# Patient Record
Sex: FEMALE | Race: BLACK OR AFRICAN AMERICAN | NOT HISPANIC OR LATINO
[De-identification: names, ages, dates, MRNs, and addresses within clinical notes are randomized per-mention and may not be internally consistent; named-entity substitution may affect disease eponyms.]

---

## 2017-03-01 ENCOUNTER — APPOINTMENT (OUTPATIENT)
Dept: CV DIAGNOSITCS | Facility: HOSPITAL | Age: 82
End: 2017-03-01

## 2017-03-01 ENCOUNTER — OUTPATIENT (OUTPATIENT)
Dept: OUTPATIENT SERVICES | Facility: HOSPITAL | Age: 82
LOS: 1 days | End: 2017-03-01
Payer: COMMERCIAL

## 2017-03-01 DIAGNOSIS — I05.9 RHEUMATIC MITRAL VALVE DISEASE, UNSPECIFIED: ICD-10-CM

## 2017-03-01 DIAGNOSIS — Z98.49 CATARACT EXTRACTION STATUS, UNSPECIFIED EYE: Chronic | ICD-10-CM

## 2017-03-01 DIAGNOSIS — Z98.89 OTHER SPECIFIED POSTPROCEDURAL STATES: Chronic | ICD-10-CM

## 2017-03-01 PROCEDURE — 93306 TTE W/DOPPLER COMPLETE: CPT | Mod: 26

## 2017-03-01 PROCEDURE — 93312 ECHO TRANSESOPHAGEAL: CPT | Mod: 26

## 2017-03-15 ENCOUNTER — APPOINTMENT (OUTPATIENT)
Dept: CARDIOTHORACIC SURGERY | Facility: CLINIC | Age: 82
End: 2017-03-15

## 2017-03-15 VITALS
SYSTOLIC BLOOD PRESSURE: 180 MMHG | TEMPERATURE: 98.3 F | OXYGEN SATURATION: 97 % | DIASTOLIC BLOOD PRESSURE: 84 MMHG | BODY MASS INDEX: 24.75 KG/M2 | RESPIRATION RATE: 18 BRPM | HEART RATE: 108 BPM | WEIGHT: 145 LBS | HEIGHT: 64 IN

## 2017-03-15 VITALS — DIASTOLIC BLOOD PRESSURE: 107 MMHG | SYSTOLIC BLOOD PRESSURE: 174 MMHG

## 2017-03-15 RX ORDER — EZETIMIBE 10 MG/1
10 TABLET ORAL
Refills: 0 | Status: ACTIVE | COMMUNITY

## 2017-03-15 RX ORDER — ASPIRIN ENTERIC COATED TABLETS 81 MG 81 MG/1
81 TABLET, DELAYED RELEASE ORAL DAILY
Refills: 0 | Status: ACTIVE | COMMUNITY

## 2017-03-17 ENCOUNTER — OUTPATIENT (OUTPATIENT)
Dept: OUTPATIENT SERVICES | Facility: HOSPITAL | Age: 82
LOS: 1 days | End: 2017-03-17
Payer: COMMERCIAL

## 2017-03-17 ENCOUNTER — APPOINTMENT (OUTPATIENT)
Dept: CT IMAGING | Facility: IMAGING CENTER | Age: 82
End: 2017-03-17

## 2017-03-17 DIAGNOSIS — Z98.89 OTHER SPECIFIED POSTPROCEDURAL STATES: Chronic | ICD-10-CM

## 2017-03-17 DIAGNOSIS — Z00.8 ENCOUNTER FOR OTHER GENERAL EXAMINATION: ICD-10-CM

## 2017-03-17 DIAGNOSIS — Z98.49 CATARACT EXTRACTION STATUS, UNSPECIFIED EYE: Chronic | ICD-10-CM

## 2017-03-17 PROCEDURE — 82565 ASSAY OF CREATININE: CPT

## 2017-03-17 PROCEDURE — 71260 CT THORAX DX C+: CPT

## 2017-03-17 PROCEDURE — 74177 CT ABD & PELVIS W/CONTRAST: CPT

## 2017-03-24 ENCOUNTER — OUTPATIENT (OUTPATIENT)
Dept: OUTPATIENT SERVICES | Facility: HOSPITAL | Age: 82
LOS: 1 days | End: 2017-03-24
Payer: COMMERCIAL

## 2017-03-24 VITALS
SYSTOLIC BLOOD PRESSURE: 173 MMHG | DIASTOLIC BLOOD PRESSURE: 103 MMHG | HEIGHT: 65 IN | HEART RATE: 86 BPM | TEMPERATURE: 98 F | WEIGHT: 149.91 LBS | RESPIRATION RATE: 16 BRPM | OXYGEN SATURATION: 98 %

## 2017-03-24 DIAGNOSIS — Z98.49 CATARACT EXTRACTION STATUS, UNSPECIFIED EYE: Chronic | ICD-10-CM

## 2017-03-24 DIAGNOSIS — I25.10 ATHEROSCLEROTIC HEART DISEASE OF NATIVE CORONARY ARTERY WITHOUT ANGINA PECTORIS: ICD-10-CM

## 2017-03-24 DIAGNOSIS — Z98.89 OTHER SPECIFIED POSTPROCEDURAL STATES: Chronic | ICD-10-CM

## 2017-03-24 LAB
ALBUMIN SERPL ELPH-MCNC: 3.8 G/DL — SIGNIFICANT CHANGE UP (ref 3.3–5)
ALP SERPL-CCNC: 75 U/L — SIGNIFICANT CHANGE UP (ref 40–120)
ALT FLD-CCNC: 25 U/L RC — SIGNIFICANT CHANGE UP (ref 10–45)
ANION GAP SERPL CALC-SCNC: 17 MMOL/L — SIGNIFICANT CHANGE UP (ref 5–17)
APTT BLD: 32.4 SEC — SIGNIFICANT CHANGE UP (ref 27.5–37.4)
AST SERPL-CCNC: 18 U/L — SIGNIFICANT CHANGE UP (ref 10–40)
BILIRUB SERPL-MCNC: 0.9 MG/DL — SIGNIFICANT CHANGE UP (ref 0.2–1.2)
BUN SERPL-MCNC: 21 MG/DL — SIGNIFICANT CHANGE UP (ref 7–23)
CALCIUM SERPL-MCNC: 9.1 MG/DL — SIGNIFICANT CHANGE UP (ref 8.4–10.5)
CHLORIDE SERPL-SCNC: 103 MMOL/L — SIGNIFICANT CHANGE UP (ref 96–108)
CO2 SERPL-SCNC: 24 MMOL/L — SIGNIFICANT CHANGE UP (ref 22–31)
CREAT SERPL-MCNC: 1.19 MG/DL — SIGNIFICANT CHANGE UP (ref 0.5–1.3)
GLUCOSE SERPL-MCNC: 124 MG/DL — HIGH (ref 70–99)
HCT VFR BLD CALC: 38 % — SIGNIFICANT CHANGE UP (ref 34.5–45)
HGB BLD-MCNC: 12.5 G/DL — SIGNIFICANT CHANGE UP (ref 11.5–15.5)
INR BLD: 1.19 RATIO — HIGH (ref 0.88–1.16)
MCHC RBC-ENTMCNC: 28.5 PG — SIGNIFICANT CHANGE UP (ref 27–34)
MCHC RBC-ENTMCNC: 33 GM/DL — SIGNIFICANT CHANGE UP (ref 32–36)
MCV RBC AUTO: 86.4 FL — SIGNIFICANT CHANGE UP (ref 80–100)
PLATELET # BLD AUTO: 143 K/UL — LOW (ref 150–400)
POTASSIUM SERPL-MCNC: 3.4 MMOL/L — LOW (ref 3.5–5.3)
POTASSIUM SERPL-SCNC: 3.4 MMOL/L — LOW (ref 3.5–5.3)
PROT SERPL-MCNC: 6.7 G/DL — SIGNIFICANT CHANGE UP (ref 6–8.3)
PROTHROM AB SERPL-ACNC: 13 SEC — HIGH (ref 9.8–12.7)
RBC # BLD: 4.4 M/UL — SIGNIFICANT CHANGE UP (ref 3.8–5.2)
RBC # FLD: 15.7 % — HIGH (ref 10.3–14.5)
SODIUM SERPL-SCNC: 144 MMOL/L — SIGNIFICANT CHANGE UP (ref 135–145)
WBC # BLD: 5.3 K/UL — SIGNIFICANT CHANGE UP (ref 3.8–10.5)
WBC # FLD AUTO: 5.3 K/UL — SIGNIFICANT CHANGE UP (ref 3.8–10.5)

## 2017-03-24 PROCEDURE — C1887: CPT

## 2017-03-24 PROCEDURE — 93461 R&L HRT ART/VENTRICLE ANGIO: CPT | Mod: 26,GC,59

## 2017-03-24 PROCEDURE — 85610 PROTHROMBIN TIME: CPT

## 2017-03-24 PROCEDURE — 85730 THROMBOPLASTIN TIME PARTIAL: CPT

## 2017-03-24 PROCEDURE — 85027 COMPLETE CBC AUTOMATED: CPT

## 2017-03-24 PROCEDURE — 80053 COMPREHEN METABOLIC PANEL: CPT

## 2017-03-24 PROCEDURE — C1769: CPT

## 2017-03-24 PROCEDURE — 93010 ELECTROCARDIOGRAM REPORT: CPT

## 2017-03-24 PROCEDURE — C1894: CPT

## 2017-03-24 PROCEDURE — 93005 ELECTROCARDIOGRAM TRACING: CPT

## 2017-03-24 PROCEDURE — 93461 R&L HRT ART/VENTRICLE ANGIO: CPT

## 2017-03-24 NOTE — H&P CARDIOLOGY - PMH
Breast cancer  s/p resection, chemo  CAD (coronary artery disease)    Cancer    Cardiac arrhythmia    Cataract  Left  GERD (gastroesophageal reflux disease)    Gout    HLD (hyperlipidemia)    HTN    Hypertension    PAF (paroxysmal atrial fibrillation)  7/13 prior to CABG

## 2017-03-24 NOTE — H&P CARDIOLOGY - PSH
S/P CABG  x3 on 7/1/13  S/P cataract extraction  bilaterally-2014 withy artificial lenses  S/P hysterectomy    Status post left breast lumpectomy

## 2017-03-24 NOTE — H&P CARDIOLOGY - HISTORY OF PRESENT ILLNESS
This is a 82 y/o F with PMx of breast ca (dx'ed in July, s/p resection, was on chemo, now in remission as per patient), HTN, HLD, PAF(on Eliquis last dose 2-3 weeks ago, it was stopped r/t blood in urine- no further bleeding noted since Eliquis was stopped), s/p fallx1 ( x1 month ago, she states she was on the floor for 7 hours before her neighbor found her and helped her ).  Presents to Md Cowan with c/o of  SOB x1 month; patient states SOB started right after her fall, denies any injury from fall.  She states lying down makes SOB worse and not associated with exertion, denies cough. She states she become weaker with the chemo ( reason why she believes she fell). Referred here for cardiac cath pre TAVR. Currently, denies any: fever, chills, head trauma, syncope, LOC, chest pain, LE edema, abdominal pain, melena, hematochezia, diarrhea, constipation, or ARMSTRONG. This is a 82 y/o F with PMx of breast ca (dx'ed in July, s/p resection, was on chemo, now in remission as per patient), HTN, HLD, PAF(on Eliquis last dose 2-3 weeks ago, it was stopped r/t blood in urine- no further bleeding noted since Eliquis was stopped), s/p fallx1 ( x1 month ago, she states she was on the floor for 7 hours before her neighbor found her and helped her ).  Presents to Md Cowan with c/o of  SOB x1 month; patient states SOB started right after her fall, denies any injury from fall.  She states lying down makes SOB worse and not associated with exertion, denies cough. She states she become weaker with the chemo ( reason why she believes she fell). Abnormal ECHO ( see report below). Referred here for cardiac cath for valves evaluation. Currently, denies any: fever, chills, head trauma, syncope, LOC, chest pain, LE edema, abdominal pain, melena, hematochezia, diarrhea, constipation, or ARMSTRONG.     ECHO 03/01/2017  Conclusions:  1. Mitral annular calcification and calcified mitral  leaflets with normal diastolic opening.  Central  malcoaptation. Severe central mitral regurgitation (2  jets).  Pulmonary vein systolic flow reversal.  2. Calcified trileaflet aortic valve with decreased  opening. Peak transaortic valve gradient equals 7 mm Hg,  mean transaortic valve gradient equals 4 mm Hg, estimated  aortic valve area equals 1.8 sqcm (by planimetry), aortic  valve velocity time integral equals 27 cm, consistent with  mild aortic stenosis.  3. Severely dilated left atrium.  LA volume index = 109  cc/m2.   No left atrial or left atrial appendage thrombus.   Decreased left atrial appendage velocities noted.  4. Eccentric left ventricular hypertrophy (dilated left  ventricle with normal relative wall thickness).  5. Moderate segmental left ventricular systolic  dysfunction.  Akinesis of the mid to apical septum, apex,  distal inferior wall.  6. Moderate right atrial enlargement.  7. Estimated pulmonary artery systolic pressure equals 52  mm Hg, assuming right atrial pressure equals 8 mm Hg,  consistent with moderate pulmonary pressures.  8. Agitated saline injection and color flow Doppler  demonstrate no evidence of a patent foramen ovale.    03/17/2017  CT IMPRESSION:     CHEST:  1. Relationship of mediastinal structures to sternum as described above.   2. Cardiomegaly and left ventricular apical aneurysm.   3. Small bilateral pleural effusions.     ABDOMEN AND PELVIS:   1. 2 small suspicious right renal lesions. Is recommended to further   characterize. This is a 82 y/o F with PMx of breast ca (dx'ed in July, s/p resection, was on chemo, now in remission as per patient), HTN, HLD, PAF(on Eliquis last dose 2-3 weeks ago, it was stopped r/t blood in urine- no further bleeding noted since Eliquis was stopped), s/p fallx1 ( x1 month ago, she states she was on the floor for 7 hours before her neighbor found her and helped her ).  Presents to Md Cowan with c/o of  SOB x1 month; patient states SOB started right after her fall, denies any injury from fall.  She states lying down makes SOB worse and not associated with exertion, denies cough. She states she become weaker with the chemo ( reason why she believes she fell). Abnormal ECHO ( see report below). Referred here for cardiac cath for valves evaluation, pre-TAVR work up. Currently, denies any: fever, chills, head trauma, syncope, LOC, chest pain, LE edema, abdominal pain, melena, hematochezia, diarrhea, constipation, or ARMSTRONG.     ECHO 03/01/2017  Conclusions:  1. Mitral annular calcification and calcified mitral  leaflets with normal diastolic opening.  Central  malcoaptation. Severe central mitral regurgitation (2  jets).  Pulmonary vein systolic flow reversal.  2. Calcified trileaflet aortic valve with decreased  opening. Peak transaortic valve gradient equals 7 mm Hg,  mean transaortic valve gradient equals 4 mm Hg, estimated  aortic valve area equals 1.8 sqcm (by planimetry), aortic  valve velocity time integral equals 27 cm, consistent with  mild aortic stenosis.  3. Severely dilated left atrium.  LA volume index = 109  cc/m2.   No left atrial or left atrial appendage thrombus.   Decreased left atrial appendage velocities noted.  4. Eccentric left ventricular hypertrophy (dilated left  ventricle with normal relative wall thickness).  5. Moderate segmental left ventricular systolic  dysfunction.  Akinesis of the mid to apical septum, apex,  distal inferior wall.  6. Moderate right atrial enlargement.  7. Estimated pulmonary artery systolic pressure equals 52  mm Hg, assuming right atrial pressure equals 8 mm Hg,  consistent with moderate pulmonary pressures.  8. Agitated saline injection and color flow Doppler  demonstrate no evidence of a patent foramen ovale.    03/17/2017  CT IMPRESSION:     CHEST:  1. Relationship of mediastinal structures to sternum as described above.   2. Cardiomegaly and left ventricular apical aneurysm.   3. Small bilateral pleural effusions.     ABDOMEN AND PELVIS:   1. 2 small suspicious right renal lesions. Is recommended to further   characterize. This is a 82 y/o F with PMx of breast ca (dx'ed in July, s/p resection, was on chemo, now in remission as per patient), HTN, HLD, PAF(on Eliquis last dose 2-3 weeks ago, it was stopped r/t blood in urine- no further bleeding noted since Eliquis was stopped), s/p fallx1 ( x1 month ago, she states she was on the floor for 7 hours before her neighbor found her and helped her ), lower extremities edema.  Presents to Md Cowan with c/o of  SOB x1 month; patient states SOB started right after her fall, denies any injury from fall.  She states lying down makes SOB worse and not associated with exertion, denies cough. She states she become weaker with the chemo ( reason why she believes she fell). Abnormal ECHO ( see report below). Referred here for cardiac cath for valves evaluation, pre-TAVR work up. Currently, denies any: fever, chills, head trauma, syncope, LOC, chest pain, abdominal pain, melena, hematochezia, diarrhea, constipation, or ARMSTRONG.     ECHO 03/01/2017  EF 40%  Conclusions:  1. Mitral annular calcification and calcified mitral  leaflets with normal diastolic opening.  Central  malcoaptation. Severe central mitral regurgitation (2  jets).  Pulmonary vein systolic flow reversal.  2. Calcified trileaflet aortic valve with decreased  opening. Peak transaortic valve gradient equals 7 mm Hg,  mean transaortic valve gradient equals 4 mm Hg, estimated  aortic valve area equals 1.8 sqcm (by planimetry), aortic  valve velocity time integral equals 27 cm, consistent with  mild aortic stenosis.  3. Severely dilated left atrium.  LA volume index = 109  cc/m2.   No left atrial or left atrial appendage thrombus.   Decreased left atrial appendage velocities noted.  4. Eccentric left ventricular hypertrophy (dilated left  ventricle with normal relative wall thickness).  5. Moderate segmental left ventricular systolic  dysfunction.  Akinesis of the mid to apical septum, apex,  distal inferior wall.  6. Moderate right atrial enlargement.  7. Estimated pulmonary artery systolic pressure equals 52  mm Hg, assuming right atrial pressure equals 8 mm Hg,  consistent with moderate pulmonary pressures.  8. Agitated saline injection and color flow Doppler  demonstrate no evidence of a patent foramen ovale.    03/17/2017  CT IMPRESSION:     CHEST:  1. Relationship of mediastinal structures to sternum as described above.   2. Cardiomegaly and left ventricular apical aneurysm.   3. Small bilateral pleural effusions.     ABDOMEN AND PELVIS:   1. 2 small suspicious right renal lesions. Is recommended to further   characterize.

## 2017-03-29 ENCOUNTER — APPOINTMENT (OUTPATIENT)
Dept: CARDIOTHORACIC SURGERY | Facility: CLINIC | Age: 82
End: 2017-03-29

## 2017-03-29 VITALS
TEMPERATURE: 97.7 F | DIASTOLIC BLOOD PRESSURE: 75 MMHG | OXYGEN SATURATION: 98 % | WEIGHT: 150 LBS | HEART RATE: 82 BPM | SYSTOLIC BLOOD PRESSURE: 168 MMHG | BODY MASS INDEX: 25.61 KG/M2 | RESPIRATION RATE: 16 BRPM | HEIGHT: 64 IN

## 2017-03-29 DIAGNOSIS — I35.0 NONRHEUMATIC AORTIC (VALVE) STENOSIS: ICD-10-CM

## 2017-04-07 ENCOUNTER — APPOINTMENT (OUTPATIENT)
Dept: CARDIOTHORACIC SURGERY | Facility: CLINIC | Age: 82
End: 2017-04-07
Payer: MEDICARE

## 2017-04-07 ENCOUNTER — APPOINTMENT (OUTPATIENT)
Dept: CARDIOLOGY | Facility: CLINIC | Age: 82
End: 2017-04-07

## 2017-04-07 VITALS
WEIGHT: 150 LBS | HEIGHT: 64 IN | RESPIRATION RATE: 15 BRPM | HEART RATE: 86 BPM | OXYGEN SATURATION: 95 % | DIASTOLIC BLOOD PRESSURE: 84 MMHG | BODY MASS INDEX: 25.61 KG/M2 | SYSTOLIC BLOOD PRESSURE: 137 MMHG | TEMPERATURE: 97.6 F

## 2017-04-07 VITALS — SYSTOLIC BLOOD PRESSURE: 136 MMHG | DIASTOLIC BLOOD PRESSURE: 84 MMHG

## 2017-04-07 DIAGNOSIS — I34.0 NONRHEUMATIC MITRAL (VALVE) INSUFFICIENCY: ICD-10-CM

## 2017-04-07 DIAGNOSIS — R60.0 LOCALIZED EDEMA: ICD-10-CM

## 2017-04-07 DIAGNOSIS — I10 ESSENTIAL (PRIMARY) HYPERTENSION: ICD-10-CM

## 2017-04-07 PROCEDURE — 99213 OFFICE O/P EST LOW 20 MIN: CPT

## 2017-04-07 PROCEDURE — 99215 OFFICE O/P EST HI 40 MIN: CPT

## 2017-04-07 PROCEDURE — 93000 ELECTROCARDIOGRAM COMPLETE: CPT

## 2017-04-07 PROCEDURE — 99205 OFFICE O/P NEW HI 60 MIN: CPT

## 2017-04-07 RX ORDER — DILTIAZEM HYDROCHLORIDE 240 MG/1
240 CAPSULE, COATED, EXTENDED RELEASE ORAL
Refills: 0 | Status: COMPLETED | COMMUNITY
End: 2017-04-07

## 2017-04-13 PROCEDURE — 93312 ECHO TRANSESOPHAGEAL: CPT

## 2017-04-13 PROCEDURE — 93306 TTE W/DOPPLER COMPLETE: CPT

## 2017-04-21 ENCOUNTER — INPATIENT (INPATIENT)
Facility: HOSPITAL | Age: 82
LOS: 3 days | Discharge: HOME HEALTH | DRG: 305 | End: 2017-04-25
Attending: THORACIC SURGERY (CARDIOTHORACIC VASCULAR SURGERY) | Admitting: STUDENT IN AN ORGANIZED HEALTH CARE EDUCATION/TRAINING PROGRAM
Payer: COMMERCIAL

## 2017-04-21 VITALS
SYSTOLIC BLOOD PRESSURE: 149 MMHG | HEART RATE: 98 BPM | OXYGEN SATURATION: 99 % | DIASTOLIC BLOOD PRESSURE: 92 MMHG | RESPIRATION RATE: 20 BRPM

## 2017-04-21 DIAGNOSIS — Z98.49 CATARACT EXTRACTION STATUS, UNSPECIFIED EYE: Chronic | ICD-10-CM

## 2017-04-21 DIAGNOSIS — Z98.89 OTHER SPECIFIED POSTPROCEDURAL STATES: Chronic | ICD-10-CM

## 2017-04-21 LAB
ALBUMIN SERPL ELPH-MCNC: 3.3 G/DL — SIGNIFICANT CHANGE UP (ref 3.3–5)
ALP SERPL-CCNC: 214 U/L — HIGH (ref 40–120)
ALT FLD-CCNC: 53 U/L RC — HIGH (ref 10–45)
ANION GAP SERPL CALC-SCNC: 17 MMOL/L — SIGNIFICANT CHANGE UP (ref 5–17)
AST SERPL-CCNC: 27 U/L — SIGNIFICANT CHANGE UP (ref 10–40)
BASOPHILS # BLD AUTO: 0 K/UL — SIGNIFICANT CHANGE UP (ref 0–0.2)
BASOPHILS NFR BLD AUTO: 0.5 % — SIGNIFICANT CHANGE UP (ref 0–2)
BILIRUB SERPL-MCNC: 1.4 MG/DL — HIGH (ref 0.2–1.2)
BUN SERPL-MCNC: 38 MG/DL — HIGH (ref 7–23)
CALCIUM SERPL-MCNC: 9.3 MG/DL — SIGNIFICANT CHANGE UP (ref 8.4–10.5)
CHLORIDE SERPL-SCNC: 99 MMOL/L — SIGNIFICANT CHANGE UP (ref 96–108)
CK MB CFR SERPL CALC: 5.6 NG/ML — HIGH (ref 0–3.8)
CK SERPL-CCNC: 80 U/L — SIGNIFICANT CHANGE UP (ref 25–170)
CO2 SERPL-SCNC: 21 MMOL/L — LOW (ref 22–31)
CREAT SERPL-MCNC: 1.78 MG/DL — HIGH (ref 0.5–1.3)
EOSINOPHIL # BLD AUTO: 0 K/UL — SIGNIFICANT CHANGE UP (ref 0–0.5)
EOSINOPHIL NFR BLD AUTO: 0.4 % — SIGNIFICANT CHANGE UP (ref 0–6)
GAS PNL BLDV: SIGNIFICANT CHANGE UP
GLUCOSE SERPL-MCNC: 111 MG/DL — HIGH (ref 70–99)
HCT VFR BLD CALC: 43.4 % — SIGNIFICANT CHANGE UP (ref 34.5–45)
HGB BLD-MCNC: 14.4 G/DL — SIGNIFICANT CHANGE UP (ref 11.5–15.5)
LYMPHOCYTES # BLD AUTO: 1.4 K/UL — SIGNIFICANT CHANGE UP (ref 1–3.3)
LYMPHOCYTES # BLD AUTO: 15.8 % — SIGNIFICANT CHANGE UP (ref 13–44)
MCHC RBC-ENTMCNC: 28.8 PG — SIGNIFICANT CHANGE UP (ref 27–34)
MCHC RBC-ENTMCNC: 33.3 GM/DL — SIGNIFICANT CHANGE UP (ref 32–36)
MCV RBC AUTO: 86.4 FL — SIGNIFICANT CHANGE UP (ref 80–100)
MONOCYTES # BLD AUTO: 1.3 K/UL — HIGH (ref 0–0.9)
MONOCYTES NFR BLD AUTO: 15.5 % — HIGH (ref 2–14)
NEUTROPHILS # BLD AUTO: 5.8 K/UL — SIGNIFICANT CHANGE UP (ref 1.8–7.4)
NEUTROPHILS NFR BLD AUTO: 67.8 % — SIGNIFICANT CHANGE UP (ref 43–77)
NT-PROBNP SERPL-SCNC: HIGH PG/ML (ref 0–300)
PLATELET # BLD AUTO: 183 K/UL — SIGNIFICANT CHANGE UP (ref 150–400)
POTASSIUM SERPL-MCNC: 4.7 MMOL/L — SIGNIFICANT CHANGE UP (ref 3.5–5.3)
POTASSIUM SERPL-SCNC: 4.7 MMOL/L — SIGNIFICANT CHANGE UP (ref 3.5–5.3)
PROT SERPL-MCNC: 6.5 G/DL — SIGNIFICANT CHANGE UP (ref 6–8.3)
RBC # BLD: 5.02 M/UL — SIGNIFICANT CHANGE UP (ref 3.8–5.2)
RBC # FLD: 16.7 % — HIGH (ref 10.3–14.5)
SODIUM SERPL-SCNC: 137 MMOL/L — SIGNIFICANT CHANGE UP (ref 135–145)
TROPONIN T SERPL-MCNC: <0.01 NG/ML — SIGNIFICANT CHANGE UP (ref 0–0.06)
WBC # BLD: 8.6 K/UL — SIGNIFICANT CHANGE UP (ref 3.8–10.5)
WBC # FLD AUTO: 8.6 K/UL — SIGNIFICANT CHANGE UP (ref 3.8–10.5)

## 2017-04-21 PROCEDURE — 99285 EMERGENCY DEPT VISIT HI MDM: CPT

## 2017-04-21 NOTE — ED PROVIDER NOTE - OBJECTIVE STATEMENT
This is a 84 y/o F with PMx of breast ca (dx'ed in July, s/p resection, was on chemo, now in remission as per patient), HTN, HLD, PAF(on Eliquis last dose 1.5 months ago, it was stopped r/t blood in urine- no further bleeding noted since Eliquis was stopped), seen by Dr. Whitaker (CT surg) dx with new mitral regurg, and was scheduled for TAVR on 5/16/17 by Dr. Walters. pt p/w SOB, dizziness, weakness, and fatigue. not eating and losing weight, and son was concerned and feels pt should be admitted for earlier operation. no fever/chill. no cp.

## 2017-04-21 NOTE — ED PROVIDER NOTE - PHYSICAL EXAMINATION
patient awake alert NAD .   LUNGS CTAB no wheeze no crackle.   CARD irregular systolic murmur at 2nd intercostal space.    Abdomen soft NT ND no rebound no guarding no CVA tenderness.   EXT WWP no edema no calf tenderness CV 2+DP/PT bilaterally.   neuro A&Ox3 gait normal.    skin warm and dry no rash  HEENT: moist mucous membranes, PERRL, EOMI

## 2017-04-21 NOTE — ED ADULT NURSE NOTE - OBJECTIVE STATEMENT
85 y/o F, A&Ox3, enters ED w/ c/o SOB. Pt. states SOB has been ongoing "for some time now." Hx. left lumpectomy in 2016, triple bypass 5 years ago. Pt. was on chemo, but MD gonzalez chemo s/p pt. fall. Was under radiation but no longer. Hx. of HLD, HTN - switched from Cardizem to Metoprolol as per pt.'s family. BLE edema which family states started in Feb. Pt. states SOB worsens at night and when laying down. Denies chest pain, but c/o intermittent palpitations. As per pt. and family, pt. has had a decreased appetite and has been losing weight. Denies fever/chills/n/v. Pt. was also on Eliquis for A.fib but MD d/c due to blood in urine - pt. denies any hematuria since the d/c. Pt. recently diagnosed w/ mitral regurgitation - scheduled for TAVR in May. Lung sounds clear bilaterally. Family at bedside.

## 2017-04-21 NOTE — ED PROVIDER NOTE - MEDICAL DECISION MAKING DETAILS
84yoF 84yoF with multiple medical issues, scheduled for TAVR 5/16/17 for mitral insufficiency, brought by family with SOB, failure to thrive, will obtain blood work, cxr, cards consult, reeval. -ZR

## 2017-04-22 DIAGNOSIS — R06.00 DYSPNEA, UNSPECIFIED: ICD-10-CM

## 2017-04-22 DIAGNOSIS — I34.0 NONRHEUMATIC MITRAL (VALVE) INSUFFICIENCY: ICD-10-CM

## 2017-04-22 LAB
ALBUMIN SERPL ELPH-MCNC: 3.3 G/DL — SIGNIFICANT CHANGE UP (ref 3.3–5)
ALP SERPL-CCNC: 192 U/L — HIGH (ref 40–120)
ALT FLD-CCNC: 46 U/L RC — HIGH (ref 10–45)
ANION GAP SERPL CALC-SCNC: 17 MMOL/L — SIGNIFICANT CHANGE UP (ref 5–17)
APPEARANCE UR: CLEAR — SIGNIFICANT CHANGE UP
APTT BLD: 29.1 SEC — SIGNIFICANT CHANGE UP (ref 27.5–37.4)
AST SERPL-CCNC: 22 U/L — SIGNIFICANT CHANGE UP (ref 10–40)
BASOPHILS # BLD AUTO: 0 K/UL — SIGNIFICANT CHANGE UP (ref 0–0.2)
BASOPHILS NFR BLD AUTO: 0.5 % — SIGNIFICANT CHANGE UP (ref 0–2)
BILIRUB SERPL-MCNC: 1.2 MG/DL — SIGNIFICANT CHANGE UP (ref 0.2–1.2)
BILIRUB UR-MCNC: NEGATIVE — SIGNIFICANT CHANGE UP
BLD GP AB SCN SERPL QL: NEGATIVE — SIGNIFICANT CHANGE UP
BUN SERPL-MCNC: 40 MG/DL — HIGH (ref 7–23)
CALCIUM SERPL-MCNC: 8.7 MG/DL — SIGNIFICANT CHANGE UP (ref 8.4–10.5)
CHLORIDE SERPL-SCNC: 100 MMOL/L — SIGNIFICANT CHANGE UP (ref 96–108)
CO2 SERPL-SCNC: 22 MMOL/L — SIGNIFICANT CHANGE UP (ref 22–31)
COLOR SPEC: SIGNIFICANT CHANGE UP
CREAT SERPL-MCNC: 1.74 MG/DL — HIGH (ref 0.5–1.3)
DIFF PNL FLD: NEGATIVE — SIGNIFICANT CHANGE UP
EOSINOPHIL # BLD AUTO: 0.1 K/UL — SIGNIFICANT CHANGE UP (ref 0–0.5)
EOSINOPHIL NFR BLD AUTO: 0.6 % — SIGNIFICANT CHANGE UP (ref 0–6)
GAS PNL BLDA: SIGNIFICANT CHANGE UP
GLUCOSE SERPL-MCNC: 204 MG/DL — HIGH (ref 70–99)
GLUCOSE UR QL: NEGATIVE — SIGNIFICANT CHANGE UP
HBA1C BLD-MCNC: 6.1 % — HIGH (ref 4–5.6)
HCT VFR BLD CALC: 42.4 % — SIGNIFICANT CHANGE UP (ref 34.5–45)
HGB BLD-MCNC: 13.8 G/DL — SIGNIFICANT CHANGE UP (ref 11.5–15.5)
INR BLD: 1.57 RATIO — HIGH (ref 0.88–1.16)
KETONES UR-MCNC: NEGATIVE — SIGNIFICANT CHANGE UP
LEUKOCYTE ESTERASE UR-ACNC: NEGATIVE — SIGNIFICANT CHANGE UP
LYMPHOCYTES # BLD AUTO: 1.3 K/UL — SIGNIFICANT CHANGE UP (ref 1–3.3)
LYMPHOCYTES # BLD AUTO: 14.8 % — SIGNIFICANT CHANGE UP (ref 13–44)
MCHC RBC-ENTMCNC: 27.9 PG — SIGNIFICANT CHANGE UP (ref 27–34)
MCHC RBC-ENTMCNC: 32.5 GM/DL — SIGNIFICANT CHANGE UP (ref 32–36)
MCV RBC AUTO: 85.9 FL — SIGNIFICANT CHANGE UP (ref 80–100)
MONOCYTES # BLD AUTO: 1.4 K/UL — HIGH (ref 0–0.9)
MONOCYTES NFR BLD AUTO: 15.5 % — HIGH (ref 2–14)
MRSA PCR RESULT.: SIGNIFICANT CHANGE UP
NEUTROPHILS # BLD AUTO: 6 K/UL — SIGNIFICANT CHANGE UP (ref 1.8–7.4)
NEUTROPHILS NFR BLD AUTO: 68.6 % — SIGNIFICANT CHANGE UP (ref 43–77)
NITRITE UR-MCNC: NEGATIVE — SIGNIFICANT CHANGE UP
PH UR: 6 — SIGNIFICANT CHANGE UP (ref 5–8)
PLATELET # BLD AUTO: 183 K/UL — SIGNIFICANT CHANGE UP (ref 150–400)
POTASSIUM SERPL-MCNC: 4.3 MMOL/L — SIGNIFICANT CHANGE UP (ref 3.5–5.3)
POTASSIUM SERPL-SCNC: 4.3 MMOL/L — SIGNIFICANT CHANGE UP (ref 3.5–5.3)
PROT SERPL-MCNC: 5.9 G/DL — LOW (ref 6–8.3)
PROT UR-MCNC: NEGATIVE — SIGNIFICANT CHANGE UP
PROTHROM AB SERPL-ACNC: 17.1 SEC — HIGH (ref 9.8–12.7)
RBC # BLD: 4.94 M/UL — SIGNIFICANT CHANGE UP (ref 3.8–5.2)
RBC # FLD: 16.7 % — HIGH (ref 10.3–14.5)
RH IG SCN BLD-IMP: NEGATIVE — SIGNIFICANT CHANGE UP
S AUREUS DNA NOSE QL NAA+PROBE: SIGNIFICANT CHANGE UP
SODIUM SERPL-SCNC: 139 MMOL/L — SIGNIFICANT CHANGE UP (ref 135–145)
SP GR SPEC: 1.01 — LOW (ref 1.01–1.02)
T3 SERPL-MCNC: 56 NG/DL — LOW (ref 80–200)
T4 AB SER-ACNC: 6.8 UG/DL — SIGNIFICANT CHANGE UP (ref 4.6–12)
TSH SERPL-MCNC: 4.72 UIU/ML — HIGH (ref 0.27–4.2)
UROBILINOGEN FLD QL: NEGATIVE — SIGNIFICANT CHANGE UP
WBC # BLD: 8.8 K/UL — SIGNIFICANT CHANGE UP (ref 3.8–10.5)
WBC # FLD AUTO: 8.8 K/UL — SIGNIFICANT CHANGE UP (ref 3.8–10.5)

## 2017-04-22 PROCEDURE — 93010 ELECTROCARDIOGRAM REPORT: CPT

## 2017-04-22 PROCEDURE — 71010: CPT | Mod: 26

## 2017-04-22 RX ORDER — FUROSEMIDE 40 MG
20 TABLET ORAL DAILY
Qty: 0 | Refills: 0 | Status: DISCONTINUED | OUTPATIENT
Start: 2017-04-22 | End: 2017-04-25

## 2017-04-22 RX ORDER — FUROSEMIDE 40 MG
40 TABLET ORAL ONCE
Qty: 0 | Refills: 0 | Status: COMPLETED | OUTPATIENT
Start: 2017-04-22 | End: 2017-04-22

## 2017-04-22 RX ORDER — DEXTROSE 50 % IN WATER 50 %
12.5 SYRINGE (ML) INTRAVENOUS ONCE
Qty: 0 | Refills: 0 | Status: DISCONTINUED | OUTPATIENT
Start: 2017-04-22 | End: 2017-04-24

## 2017-04-22 RX ORDER — GLUCAGON INJECTION, SOLUTION 0.5 MG/.1ML
1 INJECTION, SOLUTION SUBCUTANEOUS ONCE
Qty: 0 | Refills: 0 | Status: DISCONTINUED | OUTPATIENT
Start: 2017-04-22 | End: 2017-04-24

## 2017-04-22 RX ORDER — HYDRALAZINE HCL 50 MG
10 TABLET ORAL THREE TIMES A DAY
Qty: 0 | Refills: 0 | Status: DISCONTINUED | OUTPATIENT
Start: 2017-04-22 | End: 2017-04-25

## 2017-04-22 RX ORDER — INSULIN LISPRO 100/ML
VIAL (ML) SUBCUTANEOUS
Qty: 0 | Refills: 0 | Status: DISCONTINUED | OUTPATIENT
Start: 2017-04-22 | End: 2017-04-24

## 2017-04-22 RX ORDER — INSULIN LISPRO 100/ML
VIAL (ML) SUBCUTANEOUS AT BEDTIME
Qty: 0 | Refills: 0 | Status: DISCONTINUED | OUTPATIENT
Start: 2017-04-22 | End: 2017-04-24

## 2017-04-22 RX ORDER — SODIUM CHLORIDE 9 MG/ML
1000 INJECTION, SOLUTION INTRAVENOUS
Qty: 0 | Refills: 0 | Status: DISCONTINUED | OUTPATIENT
Start: 2017-04-22 | End: 2017-04-24

## 2017-04-22 RX ORDER — PANTOPRAZOLE SODIUM 20 MG/1
40 TABLET, DELAYED RELEASE ORAL
Qty: 0 | Refills: 0 | Status: DISCONTINUED | OUTPATIENT
Start: 2017-04-22 | End: 2017-04-25

## 2017-04-22 RX ORDER — DEXTROSE 50 % IN WATER 50 %
25 SYRINGE (ML) INTRAVENOUS ONCE
Qty: 0 | Refills: 0 | Status: DISCONTINUED | OUTPATIENT
Start: 2017-04-22 | End: 2017-04-24

## 2017-04-22 RX ORDER — DEXTROSE 50 % IN WATER 50 %
1 SYRINGE (ML) INTRAVENOUS ONCE
Qty: 0 | Refills: 0 | Status: DISCONTINUED | OUTPATIENT
Start: 2017-04-22 | End: 2017-04-24

## 2017-04-22 RX ORDER — SODIUM CHLORIDE 9 MG/ML
3 INJECTION INTRAMUSCULAR; INTRAVENOUS; SUBCUTANEOUS EVERY 8 HOURS
Qty: 0 | Refills: 0 | Status: DISCONTINUED | OUTPATIENT
Start: 2017-04-22 | End: 2017-04-25

## 2017-04-22 RX ORDER — ASPIRIN/CALCIUM CARB/MAGNESIUM 324 MG
81 TABLET ORAL DAILY
Qty: 0 | Refills: 0 | Status: DISCONTINUED | OUTPATIENT
Start: 2017-04-22 | End: 2017-04-25

## 2017-04-22 RX ORDER — ANASTROZOLE 1 MG/1
1 TABLET ORAL DAILY
Qty: 0 | Refills: 0 | Status: DISCONTINUED | OUTPATIENT
Start: 2017-04-22 | End: 2017-04-22

## 2017-04-22 RX ORDER — POTASSIUM CHLORIDE 20 MEQ
10 PACKET (EA) ORAL DAILY
Qty: 0 | Refills: 0 | Status: DISCONTINUED | OUTPATIENT
Start: 2017-04-22 | End: 2017-04-25

## 2017-04-22 RX ADMIN — SODIUM CHLORIDE 3 MILLILITER(S): 9 INJECTION INTRAMUSCULAR; INTRAVENOUS; SUBCUTANEOUS at 13:59

## 2017-04-22 RX ADMIN — Medication 81 MILLIGRAM(S): at 09:49

## 2017-04-22 RX ADMIN — Medication 40 MILLIGRAM(S): at 00:13

## 2017-04-22 RX ADMIN — SODIUM CHLORIDE 3 MILLILITER(S): 9 INJECTION INTRAMUSCULAR; INTRAVENOUS; SUBCUTANEOUS at 22:23

## 2017-04-22 RX ADMIN — SODIUM CHLORIDE 3 MILLILITER(S): 9 INJECTION INTRAMUSCULAR; INTRAVENOUS; SUBCUTANEOUS at 05:11

## 2017-04-22 RX ADMIN — Medication 10 MILLIEQUIVALENT(S): at 09:49

## 2017-04-22 RX ADMIN — Medication 10 MILLIGRAM(S): at 16:59

## 2017-04-22 RX ADMIN — Medication 10 MILLIGRAM(S): at 22:20

## 2017-04-22 RX ADMIN — PANTOPRAZOLE SODIUM 40 MILLIGRAM(S): 20 TABLET, DELAYED RELEASE ORAL at 07:14

## 2017-04-22 RX ADMIN — Medication 10 MILLIGRAM(S): at 09:49

## 2017-04-22 RX ADMIN — Medication 20 MILLIGRAM(S): at 07:14

## 2017-04-22 NOTE — ED ADULT NURSE REASSESSMENT NOTE - NS ED NURSE REASSESS COMMENT FT1
report given to MANFRED Joaquin. Aware of pt.'s SOB, hx. lumpectomy. Aware pt.'s a. fib and RN aware pt.'s no longer on Eliquis. RN aware pt. is on 2 L NC.

## 2017-04-23 LAB
ANION GAP SERPL CALC-SCNC: 14 MMOL/L — SIGNIFICANT CHANGE UP (ref 5–17)
BUN SERPL-MCNC: 40 MG/DL — HIGH (ref 7–23)
CALCIUM SERPL-MCNC: 8.5 MG/DL — SIGNIFICANT CHANGE UP (ref 8.4–10.5)
CHLORIDE SERPL-SCNC: 105 MMOL/L — SIGNIFICANT CHANGE UP (ref 96–108)
CO2 SERPL-SCNC: 25 MMOL/L — SIGNIFICANT CHANGE UP (ref 22–31)
CREAT SERPL-MCNC: 1.36 MG/DL — HIGH (ref 0.5–1.3)
GLUCOSE SERPL-MCNC: 118 MG/DL — HIGH (ref 70–99)
HCT VFR BLD CALC: 39.7 % — SIGNIFICANT CHANGE UP (ref 34.5–45)
HGB BLD-MCNC: 13.1 G/DL — SIGNIFICANT CHANGE UP (ref 11.5–15.5)
MAGNESIUM SERPL-MCNC: 2.3 MG/DL — SIGNIFICANT CHANGE UP (ref 1.6–2.6)
MCHC RBC-ENTMCNC: 28.6 PG — SIGNIFICANT CHANGE UP (ref 27–34)
MCHC RBC-ENTMCNC: 33 GM/DL — SIGNIFICANT CHANGE UP (ref 32–36)
MCV RBC AUTO: 86.6 FL — SIGNIFICANT CHANGE UP (ref 80–100)
PLATELET # BLD AUTO: 154 K/UL — SIGNIFICANT CHANGE UP (ref 150–400)
POTASSIUM SERPL-MCNC: 3.8 MMOL/L — SIGNIFICANT CHANGE UP (ref 3.5–5.3)
POTASSIUM SERPL-SCNC: 3.8 MMOL/L — SIGNIFICANT CHANGE UP (ref 3.5–5.3)
RBC # BLD: 4.58 M/UL — SIGNIFICANT CHANGE UP (ref 3.8–5.2)
RBC # FLD: 16.6 % — HIGH (ref 10.3–14.5)
SODIUM SERPL-SCNC: 144 MMOL/L — SIGNIFICANT CHANGE UP (ref 135–145)
WBC # BLD: 8.9 K/UL — SIGNIFICANT CHANGE UP (ref 3.8–10.5)
WBC # FLD AUTO: 8.9 K/UL — SIGNIFICANT CHANGE UP (ref 3.8–10.5)

## 2017-04-23 PROCEDURE — 99231 SBSQ HOSP IP/OBS SF/LOW 25: CPT

## 2017-04-23 PROCEDURE — 93306 TTE W/DOPPLER COMPLETE: CPT | Mod: 26

## 2017-04-23 RX ORDER — POTASSIUM CHLORIDE 20 MEQ
40 PACKET (EA) ORAL ONCE
Qty: 0 | Refills: 0 | Status: COMPLETED | OUTPATIENT
Start: 2017-04-23 | End: 2017-04-23

## 2017-04-23 RX ORDER — ACETAMINOPHEN 500 MG
650 TABLET ORAL EVERY 8 HOURS
Qty: 0 | Refills: 0 | Status: DISCONTINUED | OUTPATIENT
Start: 2017-04-23 | End: 2017-04-25

## 2017-04-23 RX ORDER — DILTIAZEM HCL 120 MG
120 CAPSULE, EXT RELEASE 24 HR ORAL DAILY
Qty: 0 | Refills: 0 | Status: DISCONTINUED | OUTPATIENT
Start: 2017-04-23 | End: 2017-04-24

## 2017-04-23 RX ADMIN — Medication 10 MILLIGRAM(S): at 21:17

## 2017-04-23 RX ADMIN — Medication 40 MILLIEQUIVALENT(S): at 01:59

## 2017-04-23 RX ADMIN — Medication 650 MILLIGRAM(S): at 23:56

## 2017-04-23 RX ADMIN — SODIUM CHLORIDE 3 MILLILITER(S): 9 INJECTION INTRAMUSCULAR; INTRAVENOUS; SUBCUTANEOUS at 21:12

## 2017-04-23 RX ADMIN — Medication 20 MILLIGRAM(S): at 06:06

## 2017-04-23 RX ADMIN — SODIUM CHLORIDE 3 MILLILITER(S): 9 INJECTION INTRAMUSCULAR; INTRAVENOUS; SUBCUTANEOUS at 11:13

## 2017-04-23 RX ADMIN — SODIUM CHLORIDE 3 MILLILITER(S): 9 INJECTION INTRAMUSCULAR; INTRAVENOUS; SUBCUTANEOUS at 06:10

## 2017-04-23 RX ADMIN — Medication 10 MILLIGRAM(S): at 06:06

## 2017-04-23 RX ADMIN — Medication 120 MILLIGRAM(S): at 17:18

## 2017-04-23 RX ADMIN — Medication 10 MILLIEQUIVALENT(S): at 13:07

## 2017-04-23 RX ADMIN — Medication 81 MILLIGRAM(S): at 13:07

## 2017-04-23 RX ADMIN — PANTOPRAZOLE SODIUM 40 MILLIGRAM(S): 20 TABLET, DELAYED RELEASE ORAL at 06:06

## 2017-04-23 RX ADMIN — Medication 10 MILLIGRAM(S): at 13:07

## 2017-04-24 ENCOUNTER — TRANSCRIPTION ENCOUNTER (OUTPATIENT)
Age: 82
End: 2017-04-24

## 2017-04-24 LAB
ANION GAP SERPL CALC-SCNC: 16 MMOL/L — SIGNIFICANT CHANGE UP (ref 5–17)
BUN SERPL-MCNC: 34 MG/DL — HIGH (ref 7–23)
CALCIUM SERPL-MCNC: 8 MG/DL — LOW (ref 8.4–10.5)
CHLORIDE SERPL-SCNC: 105 MMOL/L — SIGNIFICANT CHANGE UP (ref 96–108)
CO2 SERPL-SCNC: 24 MMOL/L — SIGNIFICANT CHANGE UP (ref 22–31)
CREAT SERPL-MCNC: 1.4 MG/DL — HIGH (ref 0.5–1.3)
GLUCOSE SERPL-MCNC: 127 MG/DL — HIGH (ref 70–99)
HCT VFR BLD CALC: 36.7 % — SIGNIFICANT CHANGE UP (ref 34.5–45)
HGB BLD-MCNC: 12.1 G/DL — SIGNIFICANT CHANGE UP (ref 11.5–15.5)
MCHC RBC-ENTMCNC: 28.6 PG — SIGNIFICANT CHANGE UP (ref 27–34)
MCHC RBC-ENTMCNC: 32.8 GM/DL — SIGNIFICANT CHANGE UP (ref 32–36)
MCV RBC AUTO: 87.3 FL — SIGNIFICANT CHANGE UP (ref 80–100)
PLATELET # BLD AUTO: 153 K/UL — SIGNIFICANT CHANGE UP (ref 150–400)
POTASSIUM SERPL-MCNC: 4 MMOL/L — SIGNIFICANT CHANGE UP (ref 3.5–5.3)
POTASSIUM SERPL-SCNC: 4 MMOL/L — SIGNIFICANT CHANGE UP (ref 3.5–5.3)
RBC # BLD: 4.21 M/UL — SIGNIFICANT CHANGE UP (ref 3.8–5.2)
RBC # FLD: 16.3 % — HIGH (ref 10.3–14.5)
SODIUM SERPL-SCNC: 145 MMOL/L — SIGNIFICANT CHANGE UP (ref 135–145)
WBC # BLD: 8.1 K/UL — SIGNIFICANT CHANGE UP (ref 3.8–10.5)
WBC # FLD AUTO: 8.1 K/UL — SIGNIFICANT CHANGE UP (ref 3.8–10.5)

## 2017-04-24 RX ORDER — METOPROLOL TARTRATE 50 MG
50 TABLET ORAL DAILY
Qty: 0 | Refills: 0 | Status: DISCONTINUED | OUTPATIENT
Start: 2017-04-25 | End: 2017-04-25

## 2017-04-24 RX ORDER — POTASSIUM CHLORIDE 20 MEQ
20 PACKET (EA) ORAL ONCE
Qty: 0 | Refills: 0 | Status: COMPLETED | OUTPATIENT
Start: 2017-04-24 | End: 2017-04-24

## 2017-04-24 RX ORDER — MAGNESIUM SULFATE 500 MG/ML
1 VIAL (ML) INJECTION ONCE
Qty: 0 | Refills: 0 | Status: COMPLETED | OUTPATIENT
Start: 2017-04-24 | End: 2017-04-24

## 2017-04-24 RX ORDER — METOPROLOL TARTRATE 50 MG
100 TABLET ORAL DAILY
Qty: 0 | Refills: 0 | Status: DISCONTINUED | OUTPATIENT
Start: 2017-04-24 | End: 2017-04-24

## 2017-04-24 RX ADMIN — SODIUM CHLORIDE 3 MILLILITER(S): 9 INJECTION INTRAMUSCULAR; INTRAVENOUS; SUBCUTANEOUS at 21:47

## 2017-04-24 RX ADMIN — Medication 100 MILLIGRAM(S): at 09:12

## 2017-04-24 RX ADMIN — Medication 10 MILLIGRAM(S): at 13:15

## 2017-04-24 RX ADMIN — Medication 650 MILLIGRAM(S): at 00:26

## 2017-04-24 RX ADMIN — Medication 10 MILLIGRAM(S): at 21:41

## 2017-04-24 RX ADMIN — Medication 20 MILLIEQUIVALENT(S): at 05:06

## 2017-04-24 RX ADMIN — Medication 100 GRAM(S): at 05:06

## 2017-04-24 RX ADMIN — Medication 81 MILLIGRAM(S): at 13:15

## 2017-04-24 RX ADMIN — Medication 10 MILLIEQUIVALENT(S): at 13:15

## 2017-04-24 RX ADMIN — SODIUM CHLORIDE 3 MILLILITER(S): 9 INJECTION INTRAMUSCULAR; INTRAVENOUS; SUBCUTANEOUS at 11:33

## 2017-04-24 RX ADMIN — Medication 20 MILLIGRAM(S): at 05:06

## 2017-04-24 RX ADMIN — Medication 10 MILLIGRAM(S): at 05:06

## 2017-04-24 RX ADMIN — PANTOPRAZOLE SODIUM 40 MILLIGRAM(S): 20 TABLET, DELAYED RELEASE ORAL at 05:06

## 2017-04-24 RX ADMIN — SODIUM CHLORIDE 3 MILLILITER(S): 9 INJECTION INTRAMUSCULAR; INTRAVENOUS; SUBCUTANEOUS at 05:07

## 2017-04-24 NOTE — DISCHARGE NOTE ADULT - HOME CARE AGENCY
[facility] [phone] Visiting nurse to call to arrange home care visit for 1-2 days after discharge. Please call home care agency with any questions or concerns.

## 2017-04-24 NOTE — DISCHARGE NOTE ADULT - CARE PROVIDER_API CALL
Yuridia Arredondo), Surgery; Thoracic and Cardiac Surgery  [address] [place] [address]  Phone: [phone]  Fax: [phone]    Chintan Desir), Cardiovascular Disease; Interventional Cardiology  [address] [place] [address]  Phone: [phone]  Fax: [phone]    Jose Wynn), Cardiovascular Disease; Nuclear Cardiology  [address] [place] [address]  Phone: [phone]  Fax: [phone]

## 2017-04-24 NOTE — DISCHARGE NOTE ADULT - REASON FOR ADMISSION
SOB, dizziness, weakness, and fatigue. not eating and losing weight 4/23/15 SOB, dizziness, weakness, and fatigue, Severe MR

## 2017-04-24 NOTE — DISCHARGE NOTE ADULT - CARE PLAN
Principal Discharge DX:	Mitral valve insufficiency, unspecified etiology  Goal:	preop for Mitral clip  Instructions for follow-up, activity and diet:	Regular no added salt diet  Shower daily  Daily weights, call for a gain > 3 lbs

## 2017-04-24 NOTE — DISCHARGE NOTE ADULT - MEDICATION SUMMARY - MEDICATIONS TO STOP TAKING
I will STOP taking the medications listed below when I get home from the hospital:    diltiaZEM 240 mg/24 hours oral capsule, extended release  -- 1 cap(s) by mouth once a day    apixaban 2.5 mg oral tablet  -- 1 tab(s) by mouth 2 times a day  last dose 2-3 weeks ago    anastrozole 1 mg oral tablet  -- 1 tab(s) by mouth once a day

## 2017-04-24 NOTE — DISCHARGE NOTE ADULT - MEDICATION SUMMARY - MEDICATIONS TO TAKE
I will START or STAY ON the medications listed below when I get home from the hospital:    acetaminophen 325 mg oral tablet  -- 2 tab(s) by mouth every 8 hours, As needed, Moderate Pain (4 - 6)  -- Indication: For pain    aspirin 81 mg oral delayed release tablet  -- 1 tab(s) by mouth once a day  -- Indication: For antiplatelet    Zetia 10 mg oral tablet  -- 1 tab(s) by mouth once a day (at bedtime)  -- Indication: For cholesterol    metoprolol succinate 50 mg oral tablet, extended release  -- 1 tab(s) by mouth once a day  -- Indication: For heart rate    furosemide 20 mg oral tablet  -- 1 tab(s) by mouth once a day  -- Indication: For Diuretic    potassium chloride 10 mEq oral tablet, extended release  -- 1 tab(s) by mouth once a day  -- Indication: For supplement    pantoprazole 40 mg oral delayed release tablet  -- 1 tab(s) by mouth once a day (before a meal)  -- Indication: For gastrointestinal    hydrALAZINE 25 mg oral tablet  -- 1 tab(s) by mouth every 8 hours  -- Indication: For blood pressure I will START or STAY ON the medications listed below when I get home from the hospital:    aspirin 81 mg oral delayed release tablet  -- 1 tab(s) by mouth once a day  -- Indication: For antiplatelet    Zetia 10 mg oral tablet  -- 1 tab(s) by mouth once a day (at bedtime)  -- Indication: For cholesterol    metoprolol succinate 50 mg oral tablet, extended release  -- 1 tab(s) by mouth once a day  -- Indication: For heart rate    furosemide 20 mg oral tablet  -- 1 tab(s) by mouth once a day  -- Indication: For Diuretic    potassium chloride 10 mEq oral tablet, extended release  -- 1 tab(s) by mouth once a day  -- Indication: For supplement    pantoprazole 40 mg oral delayed release tablet  -- 1 tab(s) by mouth once a day (before a meal)  -- Indication: For gastrointestinal    hydrALAZINE 25 mg oral tablet  -- 1 tab(s) by mouth every 8 hours  -- Indication: For blood pressure

## 2017-04-24 NOTE — DISCHARGE NOTE ADULT - CARE PROVIDERS DIRECT ADDRESSES
[email or web address]

## 2017-04-24 NOTE — DISCHARGE NOTE ADULT - PLAN OF CARE
preop for Mitral clip Regular no added salt diet  Shower daily  Daily weights, call for a gain > 3 lbs

## 2017-04-24 NOTE — DISCHARGE NOTE ADULT - HOSPITAL COURSE
This is a 84 y/o F with PMx of breast ca (dx'ed in July, s/p resection, was on chemo, now in remission as per patient), HTN, HLD, PAF(on Eliquis last dose 1.5 months ago, it was stopped r/t blood in urine- no further bleeding noted since Eliquis was stopped),  dx with new mitral regurg, and was scheduled for mitral clip on 5/16/17 by Dr. Arredondo. The pt p/w SOB, dizziness, weakness, and fatigue. not eating and losing weight, and son was concerned and feels pt should be admitted for earlier operation. no fever/chill. no cp. The pt's cxr was normal, found to be hypertensive.  Her preop cardizem was d/c, medications adjusted,  The patient is being discharged home.

## 2017-04-24 NOTE — DISCHARGE NOTE ADULT - DURABLE MEDICAL EQUIPMENT AGENCY
Natividad walker delivered to bedside by [facility] [employer] [phone].

## 2017-04-25 VITALS — DIASTOLIC BLOOD PRESSURE: 60 MMHG | SYSTOLIC BLOOD PRESSURE: 130 MMHG | HEART RATE: 73 BPM

## 2017-04-25 LAB
ANION GAP SERPL CALC-SCNC: 16 MMOL/L — SIGNIFICANT CHANGE UP (ref 5–17)
BUN SERPL-MCNC: 31 MG/DL — HIGH (ref 7–23)
CALCIUM SERPL-MCNC: 8.1 MG/DL — LOW (ref 8.4–10.5)
CHLORIDE SERPL-SCNC: 105 MMOL/L — SIGNIFICANT CHANGE UP (ref 96–108)
CO2 SERPL-SCNC: 25 MMOL/L — SIGNIFICANT CHANGE UP (ref 22–31)
CREAT SERPL-MCNC: 1.14 MG/DL — SIGNIFICANT CHANGE UP (ref 0.5–1.3)
GLUCOSE SERPL-MCNC: 114 MG/DL — HIGH (ref 70–99)
HCT VFR BLD CALC: 36.7 % — SIGNIFICANT CHANGE UP (ref 34.5–45)
HGB BLD-MCNC: 12.4 G/DL — SIGNIFICANT CHANGE UP (ref 11.5–15.5)
MCHC RBC-ENTMCNC: 29.4 PG — SIGNIFICANT CHANGE UP (ref 27–34)
MCHC RBC-ENTMCNC: 33.7 GM/DL — SIGNIFICANT CHANGE UP (ref 32–36)
MCV RBC AUTO: 87.1 FL — SIGNIFICANT CHANGE UP (ref 80–100)
PLATELET # BLD AUTO: 138 K/UL — LOW (ref 150–400)
POTASSIUM SERPL-MCNC: 3.9 MMOL/L — SIGNIFICANT CHANGE UP (ref 3.5–5.3)
POTASSIUM SERPL-SCNC: 3.9 MMOL/L — SIGNIFICANT CHANGE UP (ref 3.5–5.3)
RBC # BLD: 4.21 M/UL — SIGNIFICANT CHANGE UP (ref 3.8–5.2)
RBC # FLD: 16.4 % — HIGH (ref 10.3–14.5)
SODIUM SERPL-SCNC: 146 MMOL/L — HIGH (ref 135–145)
WBC # BLD: 7.7 K/UL — SIGNIFICANT CHANGE UP (ref 3.8–10.5)
WBC # FLD AUTO: 7.7 K/UL — SIGNIFICANT CHANGE UP (ref 3.8–10.5)

## 2017-04-25 PROCEDURE — 84480 ASSAY TRIIODOTHYRONINE (T3): CPT

## 2017-04-25 PROCEDURE — C8929: CPT

## 2017-04-25 PROCEDURE — 85610 PROTHROMBIN TIME: CPT

## 2017-04-25 PROCEDURE — 84484 ASSAY OF TROPONIN QUANT: CPT

## 2017-04-25 PROCEDURE — 71045 X-RAY EXAM CHEST 1 VIEW: CPT

## 2017-04-25 PROCEDURE — 84436 ASSAY OF TOTAL THYROXINE: CPT

## 2017-04-25 PROCEDURE — 86850 RBC ANTIBODY SCREEN: CPT

## 2017-04-25 PROCEDURE — 82435 ASSAY OF BLOOD CHLORIDE: CPT

## 2017-04-25 PROCEDURE — 82330 ASSAY OF CALCIUM: CPT

## 2017-04-25 PROCEDURE — 84295 ASSAY OF SERUM SODIUM: CPT

## 2017-04-25 PROCEDURE — 82803 BLOOD GASES ANY COMBINATION: CPT

## 2017-04-25 PROCEDURE — 82553 CREATINE MB FRACTION: CPT

## 2017-04-25 PROCEDURE — 85730 THROMBOPLASTIN TIME PARTIAL: CPT

## 2017-04-25 PROCEDURE — 83880 ASSAY OF NATRIURETIC PEPTIDE: CPT

## 2017-04-25 PROCEDURE — 86900 BLOOD TYPING SEROLOGIC ABO: CPT

## 2017-04-25 PROCEDURE — 81003 URINALYSIS AUTO W/O SCOPE: CPT

## 2017-04-25 PROCEDURE — 85014 HEMATOCRIT: CPT

## 2017-04-25 PROCEDURE — 82565 ASSAY OF CREATININE: CPT

## 2017-04-25 PROCEDURE — 80048 BASIC METABOLIC PNL TOTAL CA: CPT

## 2017-04-25 PROCEDURE — 84132 ASSAY OF SERUM POTASSIUM: CPT

## 2017-04-25 PROCEDURE — 84443 ASSAY THYROID STIM HORMONE: CPT

## 2017-04-25 PROCEDURE — 83605 ASSAY OF LACTIC ACID: CPT

## 2017-04-25 PROCEDURE — 96374 THER/PROPH/DIAG INJ IV PUSH: CPT

## 2017-04-25 PROCEDURE — 83735 ASSAY OF MAGNESIUM: CPT

## 2017-04-25 PROCEDURE — 86901 BLOOD TYPING SEROLOGIC RH(D): CPT

## 2017-04-25 PROCEDURE — 85027 COMPLETE CBC AUTOMATED: CPT

## 2017-04-25 PROCEDURE — 87640 STAPH A DNA AMP PROBE: CPT

## 2017-04-25 PROCEDURE — 82550 ASSAY OF CK (CPK): CPT

## 2017-04-25 PROCEDURE — 99285 EMERGENCY DEPT VISIT HI MDM: CPT | Mod: 25

## 2017-04-25 PROCEDURE — 87641 MR-STAPH DNA AMP PROBE: CPT

## 2017-04-25 PROCEDURE — 83036 HEMOGLOBIN GLYCOSYLATED A1C: CPT

## 2017-04-25 PROCEDURE — 80053 COMPREHEN METABOLIC PANEL: CPT

## 2017-04-25 PROCEDURE — 93005 ELECTROCARDIOGRAM TRACING: CPT

## 2017-04-25 PROCEDURE — 82947 ASSAY GLUCOSE BLOOD QUANT: CPT

## 2017-04-25 RX ORDER — HYDRALAZINE HCL 50 MG
1 TABLET ORAL
Qty: 90 | Refills: 0 | OUTPATIENT
Start: 2017-04-25 | End: 2017-05-25

## 2017-04-25 RX ORDER — HYDRALAZINE HCL 50 MG
25 TABLET ORAL
Qty: 0 | Refills: 0 | Status: DISCONTINUED | OUTPATIENT
Start: 2017-04-25 | End: 2017-04-25

## 2017-04-25 RX ORDER — HYDRALAZINE HCL 50 MG
1 TABLET ORAL
Qty: 90 | Refills: 0 | COMMUNITY
Start: 2017-04-25 | End: 2017-05-25

## 2017-04-25 RX ORDER — ANASTROZOLE 1 MG/1
1 TABLET ORAL
Qty: 0 | Refills: 0 | COMMUNITY

## 2017-04-25 RX ORDER — POTASSIUM CHLORIDE 20 MEQ
1 PACKET (EA) ORAL
Qty: 0 | Refills: 0 | COMMUNITY
Start: 2017-04-25

## 2017-04-25 RX ORDER — POTASSIUM CHLORIDE 20 MEQ
1 PACKET (EA) ORAL
Qty: 0 | Refills: 0 | COMMUNITY

## 2017-04-25 RX ORDER — METOPROLOL TARTRATE 50 MG
1 TABLET ORAL
Qty: 30 | Refills: 0 | OUTPATIENT
Start: 2017-04-25 | End: 2017-05-25

## 2017-04-25 RX ORDER — FUROSEMIDE 40 MG
1 TABLET ORAL
Qty: 0 | Refills: 0 | COMMUNITY

## 2017-04-25 RX ORDER — PANTOPRAZOLE SODIUM 20 MG/1
1 TABLET, DELAYED RELEASE ORAL
Qty: 20 | Refills: 0 | OUTPATIENT
Start: 2017-04-25 | End: 2017-05-15

## 2017-04-25 RX ORDER — FUROSEMIDE 40 MG
1 TABLET ORAL
Qty: 0 | Refills: 0 | COMMUNITY
Start: 2017-04-25

## 2017-04-25 RX ORDER — ASPIRIN/CALCIUM CARB/MAGNESIUM 324 MG
1 TABLET ORAL
Qty: 0 | Refills: 0 | COMMUNITY
Start: 2017-04-25

## 2017-04-25 RX ORDER — ACETAMINOPHEN 500 MG
2 TABLET ORAL
Qty: 120 | Refills: 0 | OUTPATIENT
Start: 2017-04-25 | End: 2017-05-15

## 2017-04-25 RX ADMIN — Medication 50 MILLIGRAM(S): at 06:39

## 2017-04-25 RX ADMIN — SODIUM CHLORIDE 3 MILLILITER(S): 9 INJECTION INTRAMUSCULAR; INTRAVENOUS; SUBCUTANEOUS at 13:06

## 2017-04-25 RX ADMIN — PANTOPRAZOLE SODIUM 40 MILLIGRAM(S): 20 TABLET, DELAYED RELEASE ORAL at 06:39

## 2017-04-25 RX ADMIN — Medication 10 MILLIGRAM(S): at 06:39

## 2017-04-25 RX ADMIN — Medication 20 MILLIGRAM(S): at 06:39

## 2017-04-25 RX ADMIN — SODIUM CHLORIDE 3 MILLILITER(S): 9 INJECTION INTRAMUSCULAR; INTRAVENOUS; SUBCUTANEOUS at 05:20

## 2017-04-25 RX ADMIN — Medication 81 MILLIGRAM(S): at 11:12

## 2017-04-25 RX ADMIN — Medication 25 MILLIGRAM(S): at 11:12

## 2017-04-25 RX ADMIN — Medication 10 MILLIEQUIVALENT(S): at 11:12

## 2017-05-10 ENCOUNTER — OUTPATIENT (OUTPATIENT)
Dept: OUTPATIENT SERVICES | Facility: HOSPITAL | Age: 82
LOS: 1 days | End: 2017-05-10
Payer: COMMERCIAL

## 2017-05-10 VITALS
DIASTOLIC BLOOD PRESSURE: 89 MMHG | HEART RATE: 92 BPM | WEIGHT: 147.93 LBS | OXYGEN SATURATION: 97 % | SYSTOLIC BLOOD PRESSURE: 151 MMHG | RESPIRATION RATE: 16 BRPM | TEMPERATURE: 98 F | HEIGHT: 65 IN

## 2017-05-10 DIAGNOSIS — I34.0 NONRHEUMATIC MITRAL (VALVE) INSUFFICIENCY: ICD-10-CM

## 2017-05-10 DIAGNOSIS — Z98.89 OTHER SPECIFIED POSTPROCEDURAL STATES: Chronic | ICD-10-CM

## 2017-05-10 DIAGNOSIS — I34.9 NONRHEUMATIC MITRAL VALVE DISORDER, UNSPECIFIED: ICD-10-CM

## 2017-05-10 DIAGNOSIS — Z98.890 OTHER SPECIFIED POSTPROCEDURAL STATES: Chronic | ICD-10-CM

## 2017-05-10 DIAGNOSIS — I25.10 ATHEROSCLEROTIC HEART DISEASE OF NATIVE CORONARY ARTERY WITHOUT ANGINA PECTORIS: ICD-10-CM

## 2017-05-10 DIAGNOSIS — Z98.49 CATARACT EXTRACTION STATUS, UNSPECIFIED EYE: Chronic | ICD-10-CM

## 2017-05-10 DIAGNOSIS — Z01.818 ENCOUNTER FOR OTHER PREPROCEDURAL EXAMINATION: ICD-10-CM

## 2017-05-10 LAB
ANION GAP SERPL CALC-SCNC: 18 MMOL/L — HIGH (ref 5–17)
BLD GP AB SCN SERPL QL: NEGATIVE — SIGNIFICANT CHANGE UP
BUN SERPL-MCNC: 26 MG/DL — HIGH (ref 7–23)
CALCIUM SERPL-MCNC: 9 MG/DL — SIGNIFICANT CHANGE UP (ref 8.4–10.5)
CHLORIDE SERPL-SCNC: 104 MMOL/L — SIGNIFICANT CHANGE UP (ref 96–108)
CO2 SERPL-SCNC: 19 MMOL/L — LOW (ref 22–31)
CREAT SERPL-MCNC: 1.27 MG/DL — SIGNIFICANT CHANGE UP (ref 0.5–1.3)
GLUCOSE SERPL-MCNC: 157 MG/DL — HIGH (ref 70–99)
HCT VFR BLD CALC: 38 % — SIGNIFICANT CHANGE UP (ref 34.5–45)
HGB BLD-MCNC: 12.2 G/DL — SIGNIFICANT CHANGE UP (ref 11.5–15.5)
MCHC RBC-ENTMCNC: 27.1 PG — SIGNIFICANT CHANGE UP (ref 27–34)
MCHC RBC-ENTMCNC: 32.1 GM/DL — SIGNIFICANT CHANGE UP (ref 32–36)
MCV RBC AUTO: 84.4 FL — SIGNIFICANT CHANGE UP (ref 80–100)
PLATELET # BLD AUTO: 217 K/UL — SIGNIFICANT CHANGE UP (ref 150–400)
POTASSIUM SERPL-MCNC: 4.1 MMOL/L — SIGNIFICANT CHANGE UP (ref 3.5–5.3)
POTASSIUM SERPL-SCNC: 4.1 MMOL/L — SIGNIFICANT CHANGE UP (ref 3.5–5.3)
RBC # BLD: 4.5 M/UL — SIGNIFICANT CHANGE UP (ref 3.8–5.2)
RBC # FLD: 17.8 % — HIGH (ref 10.3–14.5)
RH IG SCN BLD-IMP: NEGATIVE — SIGNIFICANT CHANGE UP
SODIUM SERPL-SCNC: 141 MMOL/L — SIGNIFICANT CHANGE UP (ref 135–145)
WBC # BLD: 6.93 K/UL — SIGNIFICANT CHANGE UP (ref 3.8–10.5)
WBC # FLD AUTO: 6.93 K/UL — SIGNIFICANT CHANGE UP (ref 3.8–10.5)

## 2017-05-10 PROCEDURE — 36415 COLL VENOUS BLD VENIPUNCTURE: CPT

## 2017-05-10 PROCEDURE — 86850 RBC ANTIBODY SCREEN: CPT

## 2017-05-10 PROCEDURE — G0463: CPT

## 2017-05-10 PROCEDURE — 85027 COMPLETE CBC AUTOMATED: CPT

## 2017-05-10 PROCEDURE — 86901 BLOOD TYPING SEROLOGIC RH(D): CPT

## 2017-05-10 PROCEDURE — 80048 BASIC METABOLIC PNL TOTAL CA: CPT

## 2017-05-10 PROCEDURE — 86900 BLOOD TYPING SEROLOGIC ABO: CPT

## 2017-05-10 RX ORDER — SODIUM CHLORIDE 9 MG/ML
3 INJECTION INTRAMUSCULAR; INTRAVENOUS; SUBCUTANEOUS EVERY 8 HOURS
Qty: 0 | Refills: 0 | Status: DISCONTINUED | OUTPATIENT
Start: 2017-05-16 | End: 2017-05-25

## 2017-05-10 NOTE — H&P PST ADULT - RS GEN PE MLT RESP DETAILS PC
normal/airway patent/good air movement/clear to auscultation bilaterally/respirations non-labored/breath sounds equal

## 2017-05-10 NOTE — H&P PST ADULT - NSANTHOSAYNRD_GEN_A_CORE
No. JOSEFINA screening performed.  STOP BANG Legend: 0-2 = LOW Risk; 3-4 = INTERMEDIATE Risk; 5-8 = HIGH Risk

## 2017-05-10 NOTE — H&P PST ADULT - HISTORY OF PRESENT ILLNESS
84 year old   female with HTN, HLD, CAD, s/p CABG x3, Afib on ASA, GERD, breast cancer, s/p left lumpectomy, chemo/radiation (7/2016),  severe mitral regurgitation, reports worsening dyspnea and  presents to PST for scheduled mitral clip on 5/16/17. Accompanied by son Joes. Ambulating with cane.     Patient was hospitalized recently @ [facility] (4/22-4/25) due to worsening dyspnea, had MRSA swab done- negative, CXR, EKG, VIDYA. As per Dr. Arredondo office pt does not need carotid doppler prior OR. 84 year old   female with HTN, HLD, CAD, s/p CABG x3, Afib on ASA, GERD, breast cancer, s/p left lumpectomy, chemo/radiation (7/2016),  severe mitral regurgitation, reports worsening dyspnea and  presents to PST for scheduled mitral clip on 5/16/17. Accompanied by son Jose. Ambulating with cane.     Patient was hospitalized recently @ [facility] (4/22-4/25) due to worsening dyspnea, had MRSA swab done- negative, CXR, EKG, VIDYA. As per Dr. Arredondo's office pt does not need carotid doppler prior OR.

## 2017-05-10 NOTE — H&P PST ADULT - PMH
Breast cancer  s/p resection, chemo x 3 treatments only, completed radiation  CAD (coronary artery disease)    Cataract  b/l  Dyspnea  at rest  GERD (gastroesophageal reflux disease)    Gout    HLD (hyperlipidemia)    HTN    Hypertension    Non-rheumatic mitral regurgitation  Severe  PAF (paroxysmal atrial fibrillation)  diagnosed 7/2013 , on Aspirin only, no longer taking  Eliquis  Type 2 diabetes mellitus  Diet controlled, Hg A1C 6.1 ( 4/22/17)

## 2017-05-10 NOTE — H&P PST ADULT - PSH
S/P breast lumpectomy  left 7/20/2016  S/P CABG  x3 on 7/1/13  S/P cataract extraction  bilaterally-2014 withy artificial lenses  S/P hysterectomy

## 2017-05-10 NOTE — H&P PST ADULT - ACTIVITY
was able to walk from main lobby to [facility] , 1 flight of stairs, house chores, can take care of self

## 2017-05-15 ENCOUNTER — OUTPATIENT (OUTPATIENT)
Dept: OUTPATIENT SERVICES | Facility: HOSPITAL | Age: 82
LOS: 1 days | End: 2017-05-15
Payer: COMMERCIAL

## 2017-05-15 DIAGNOSIS — Z98.49 CATARACT EXTRACTION STATUS, UNSPECIFIED EYE: Chronic | ICD-10-CM

## 2017-05-15 DIAGNOSIS — Z98.890 OTHER SPECIFIED POSTPROCEDURAL STATES: Chronic | ICD-10-CM

## 2017-05-16 ENCOUNTER — INPATIENT (INPATIENT)
Facility: HOSPITAL | Age: 82
LOS: 8 days | Discharge: NURSING, REHAB OR LONG-TERM CARE | DRG: 227 | End: 2017-05-25
Attending: THORACIC SURGERY (CARDIOTHORACIC VASCULAR SURGERY) | Admitting: THORACIC SURGERY (CARDIOTHORACIC VASCULAR SURGERY)
Payer: COMMERCIAL

## 2017-05-16 ENCOUNTER — APPOINTMENT (OUTPATIENT)
Dept: CV DIAGNOSITCS | Facility: HOSPITAL | Age: 82
End: 2017-05-16

## 2017-05-16 ENCOUNTER — APPOINTMENT (OUTPATIENT)
Dept: CARDIOTHORACIC SURGERY | Facility: HOSPITAL | Age: 82
End: 2017-05-16

## 2017-05-16 VITALS
WEIGHT: 145.06 LBS | OXYGEN SATURATION: 98 % | TEMPERATURE: 98 F | DIASTOLIC BLOOD PRESSURE: 93 MMHG | HEART RATE: 110 BPM | SYSTOLIC BLOOD PRESSURE: 180 MMHG | HEIGHT: 64 IN | RESPIRATION RATE: 16 BRPM

## 2017-05-16 DIAGNOSIS — I34.9 NONRHEUMATIC MITRAL VALVE DISORDER, UNSPECIFIED: ICD-10-CM

## 2017-05-16 DIAGNOSIS — Z98.890 OTHER SPECIFIED POSTPROCEDURAL STATES: Chronic | ICD-10-CM

## 2017-05-16 DIAGNOSIS — I34: ICD-10-CM

## 2017-05-16 DIAGNOSIS — Z98.49 CATARACT EXTRACTION STATUS, UNSPECIFIED EYE: Chronic | ICD-10-CM

## 2017-05-16 LAB
ALBUMIN SERPL ELPH-MCNC: 3.5 G/DL — SIGNIFICANT CHANGE UP (ref 3.3–5)
ALP SERPL-CCNC: 86 U/L — SIGNIFICANT CHANGE UP (ref 40–120)
ALT FLD-CCNC: 23 U/L RC — SIGNIFICANT CHANGE UP (ref 10–45)
ANION GAP SERPL CALC-SCNC: 15 MMOL/L — SIGNIFICANT CHANGE UP (ref 5–17)
ANION GAP SERPL CALC-SCNC: 16 MMOL/L — SIGNIFICANT CHANGE UP (ref 5–17)
APTT BLD: 30.9 SEC — SIGNIFICANT CHANGE UP (ref 27.5–37.4)
APTT BLD: > 200 SEC (ref 27.5–37.4)
AST SERPL-CCNC: 14 U/L — SIGNIFICANT CHANGE UP (ref 10–40)
BASOPHILS # BLD AUTO: 0 K/UL — SIGNIFICANT CHANGE UP (ref 0–0.2)
BASOPHILS NFR BLD AUTO: 0.3 % — SIGNIFICANT CHANGE UP (ref 0–2)
BILIRUB SERPL-MCNC: 0.7 MG/DL — SIGNIFICANT CHANGE UP (ref 0.2–1.2)
BLD GP AB SCN SERPL QL: NEGATIVE — SIGNIFICANT CHANGE UP
BUN SERPL-MCNC: 17 MG/DL — SIGNIFICANT CHANGE UP (ref 7–23)
BUN SERPL-MCNC: 19 MG/DL — SIGNIFICANT CHANGE UP (ref 7–23)
CALCIUM SERPL-MCNC: 8.1 MG/DL — LOW (ref 8.4–10.5)
CALCIUM SERPL-MCNC: 9.1 MG/DL — SIGNIFICANT CHANGE UP (ref 8.4–10.5)
CHLORIDE SERPL-SCNC: 106 MMOL/L — SIGNIFICANT CHANGE UP (ref 96–108)
CHLORIDE SERPL-SCNC: 107 MMOL/L — SIGNIFICANT CHANGE UP (ref 96–108)
CO2 SERPL-SCNC: 23 MMOL/L — SIGNIFICANT CHANGE UP (ref 22–31)
CO2 SERPL-SCNC: 25 MMOL/L — SIGNIFICANT CHANGE UP (ref 22–31)
CREAT SERPL-MCNC: 1.09 MG/DL — SIGNIFICANT CHANGE UP (ref 0.5–1.3)
CREAT SERPL-MCNC: 1.19 MG/DL — SIGNIFICANT CHANGE UP (ref 0.5–1.3)
EOSINOPHIL # BLD AUTO: 0.1 K/UL — SIGNIFICANT CHANGE UP (ref 0–0.5)
EOSINOPHIL NFR BLD AUTO: 2.1 % — SIGNIFICANT CHANGE UP (ref 0–6)
GAS PNL BLDA: SIGNIFICANT CHANGE UP
GLUCOSE SERPL-MCNC: 117 MG/DL — HIGH (ref 70–99)
GLUCOSE SERPL-MCNC: 120 MG/DL — HIGH (ref 70–99)
HCT VFR BLD CALC: 37.9 % — SIGNIFICANT CHANGE UP (ref 34.5–45)
HCT VFR BLD CALC: 38.7 % — SIGNIFICANT CHANGE UP (ref 34.5–45)
HGB BLD-MCNC: 12.1 G/DL — SIGNIFICANT CHANGE UP (ref 11.5–15.5)
HGB BLD-MCNC: 12.6 G/DL — SIGNIFICANT CHANGE UP (ref 11.5–15.5)
INR BLD: 1.32 RATIO — HIGH (ref 0.88–1.16)
INR BLD: 1.5 RATIO — HIGH (ref 0.88–1.16)
LYMPHOCYTES # BLD AUTO: 1.3 K/UL — SIGNIFICANT CHANGE UP (ref 1–3.3)
LYMPHOCYTES # BLD AUTO: 21.4 % — SIGNIFICANT CHANGE UP (ref 13–44)
MCHC RBC-ENTMCNC: 28.1 PG — SIGNIFICANT CHANGE UP (ref 27–34)
MCHC RBC-ENTMCNC: 28.8 PG — SIGNIFICANT CHANGE UP (ref 27–34)
MCHC RBC-ENTMCNC: 31.9 GM/DL — LOW (ref 32–36)
MCHC RBC-ENTMCNC: 32.5 GM/DL — SIGNIFICANT CHANGE UP (ref 32–36)
MCV RBC AUTO: 88.1 FL — SIGNIFICANT CHANGE UP (ref 80–100)
MCV RBC AUTO: 88.4 FL — SIGNIFICANT CHANGE UP (ref 80–100)
MONOCYTES # BLD AUTO: 1 K/UL — HIGH (ref 0–0.9)
MONOCYTES NFR BLD AUTO: 15.7 % — HIGH (ref 2–14)
NEUTROPHILS # BLD AUTO: 3.7 K/UL — SIGNIFICANT CHANGE UP (ref 1.8–7.4)
NEUTROPHILS NFR BLD AUTO: 60.5 % — SIGNIFICANT CHANGE UP (ref 43–77)
PLATELET # BLD AUTO: 189 K/UL — SIGNIFICANT CHANGE UP (ref 150–400)
PLATELET # BLD AUTO: 228 K/UL — SIGNIFICANT CHANGE UP (ref 150–400)
POTASSIUM SERPL-MCNC: 3.7 MMOL/L — SIGNIFICANT CHANGE UP (ref 3.5–5.3)
POTASSIUM SERPL-MCNC: 3.8 MMOL/L — SIGNIFICANT CHANGE UP (ref 3.5–5.3)
POTASSIUM SERPL-SCNC: 3.7 MMOL/L — SIGNIFICANT CHANGE UP (ref 3.5–5.3)
POTASSIUM SERPL-SCNC: 3.8 MMOL/L — SIGNIFICANT CHANGE UP (ref 3.5–5.3)
PROT SERPL-MCNC: 6.5 G/DL — SIGNIFICANT CHANGE UP (ref 6–8.3)
PROTHROM AB SERPL-ACNC: 14.5 SEC — HIGH (ref 9.8–12.7)
PROTHROM AB SERPL-ACNC: 16.5 SEC — HIGH (ref 9.8–12.7)
RBC # BLD: 4.3 M/UL — SIGNIFICANT CHANGE UP (ref 3.8–5.2)
RBC # BLD: 4.38 M/UL — SIGNIFICANT CHANGE UP (ref 3.8–5.2)
RBC # FLD: 15.9 % — HIGH (ref 10.3–14.5)
RBC # FLD: 15.9 % — HIGH (ref 10.3–14.5)
RH IG SCN BLD-IMP: NEGATIVE — SIGNIFICANT CHANGE UP
SODIUM SERPL-SCNC: 146 MMOL/L — HIGH (ref 135–145)
SODIUM SERPL-SCNC: 146 MMOL/L — HIGH (ref 135–145)
WBC # BLD: 6.1 K/UL — SIGNIFICANT CHANGE UP (ref 3.8–10.5)
WBC # BLD: 6.1 K/UL — SIGNIFICANT CHANGE UP (ref 3.8–10.5)
WBC # FLD AUTO: 6.1 K/UL — SIGNIFICANT CHANGE UP (ref 3.8–10.5)
WBC # FLD AUTO: 6.1 K/UL — SIGNIFICANT CHANGE UP (ref 3.8–10.5)

## 2017-05-16 PROCEDURE — 33418 REPAIR TCAT MITRAL VALVE: CPT | Mod: 62

## 2017-05-16 PROCEDURE — 33418 REPAIR TCAT MITRAL VALVE: CPT | Mod: 62,Q0

## 2017-05-16 PROCEDURE — 93010 ELECTROCARDIOGRAM REPORT: CPT

## 2017-05-16 PROCEDURE — 93355 ECHO TRANSESOPHAGEAL (TEE): CPT

## 2017-05-16 PROCEDURE — 71010: CPT | Mod: 26

## 2017-05-16 RX ORDER — POTASSIUM CHLORIDE 20 MEQ
40 PACKET (EA) ORAL ONCE
Qty: 0 | Refills: 0 | Status: COMPLETED | OUTPATIENT
Start: 2017-05-16 | End: 2017-05-16

## 2017-05-16 RX ORDER — ASPIRIN/CALCIUM CARB/MAGNESIUM 324 MG
81 TABLET ORAL DAILY
Qty: 0 | Refills: 0 | Status: DISCONTINUED | OUTPATIENT
Start: 2017-05-17 | End: 2017-05-25

## 2017-05-16 RX ORDER — NITROGLYCERIN 6.5 MG
1 CAPSULE, EXTENDED RELEASE ORAL EVERY 6 HOURS
Qty: 0 | Refills: 0 | Status: DISCONTINUED | OUTPATIENT
Start: 2017-05-16 | End: 2017-05-16

## 2017-05-16 RX ORDER — METOPROLOL TARTRATE 50 MG
100 TABLET ORAL DAILY
Qty: 0 | Refills: 0 | Status: DISCONTINUED | OUTPATIENT
Start: 2017-05-16 | End: 2017-05-17

## 2017-05-16 RX ORDER — HYDRALAZINE HCL 50 MG
25 TABLET ORAL EVERY 6 HOURS
Qty: 0 | Refills: 0 | Status: DISCONTINUED | OUTPATIENT
Start: 2017-05-16 | End: 2017-05-19

## 2017-05-16 RX ORDER — CEFUROXIME AXETIL 250 MG
1500 TABLET ORAL ONCE
Qty: 0 | Refills: 0 | Status: DISCONTINUED | OUTPATIENT
Start: 2017-05-16 | End: 2017-05-16

## 2017-05-16 RX ORDER — NICARDIPINE HYDROCHLORIDE 30 MG/1
5 CAPSULE, EXTENDED RELEASE ORAL
Qty: 40 | Refills: 0 | Status: DISCONTINUED | OUTPATIENT
Start: 2017-05-16 | End: 2017-05-17

## 2017-05-16 RX ORDER — SODIUM CHLORIDE 9 MG/ML
1000 INJECTION, SOLUTION INTRAVENOUS
Qty: 0 | Refills: 0 | Status: DISCONTINUED | OUTPATIENT
Start: 2017-05-16 | End: 2017-05-16

## 2017-05-16 RX ORDER — MEPERIDINE HYDROCHLORIDE 50 MG/ML
25 INJECTION INTRAMUSCULAR; INTRAVENOUS; SUBCUTANEOUS ONCE
Qty: 0 | Refills: 0 | Status: DISCONTINUED | OUTPATIENT
Start: 2017-05-16 | End: 2017-05-16

## 2017-05-16 RX ORDER — PANTOPRAZOLE SODIUM 20 MG/1
40 TABLET, DELAYED RELEASE ORAL
Qty: 0 | Refills: 0 | Status: DISCONTINUED | OUTPATIENT
Start: 2017-05-16 | End: 2017-05-25

## 2017-05-16 RX ORDER — METOPROLOL TARTRATE 50 MG
25 TABLET ORAL EVERY 12 HOURS
Qty: 0 | Refills: 0 | Status: DISCONTINUED | OUTPATIENT
Start: 2017-05-16 | End: 2017-05-16

## 2017-05-16 RX ORDER — CEFUROXIME AXETIL 250 MG
1500 TABLET ORAL EVERY 8 HOURS
Qty: 0 | Refills: 0 | Status: COMPLETED | OUTPATIENT
Start: 2017-05-16 | End: 2017-05-17

## 2017-05-16 RX ADMIN — Medication 40 MILLIEQUIVALENT(S): at 19:02

## 2017-05-16 RX ADMIN — NICARDIPINE HYDROCHLORIDE 25 MG/HR: 30 CAPSULE, EXTENDED RELEASE ORAL at 18:00

## 2017-05-16 RX ADMIN — Medication 100 MILLIGRAM(S): at 22:08

## 2017-05-16 RX ADMIN — Medication 25 MILLIGRAM(S): at 22:08

## 2017-05-16 RX ADMIN — Medication 100 MILLIGRAM(S): at 21:01

## 2017-05-16 RX ADMIN — SODIUM CHLORIDE 3 MILLILITER(S): 9 INJECTION INTRAMUSCULAR; INTRAVENOUS; SUBCUTANEOUS at 21:03

## 2017-05-16 RX ADMIN — Medication 25 MILLIGRAM(S): at 21:01

## 2017-05-16 NOTE — H&P CARDIOLOGY - HISTORY OF PRESENT ILLNESS
This is a 83 y/o F with PMx of breast ca (dx'ed in July, s/p resection, was on chemo, now in remission as per patient), HTN, HLD, PAF(on Eliquis last dose 1.5 months ago, it was stopped r/t blood in urine- no further bleeding noted since Eliquis was stopped), seen by Dr. Whitaker (CT surg) dx with new mitral regurg.  Patient currently denies chest pain, SOB, dizziness, weakness, and fatigue. no fever/chill, N&V, HA.  Presents here today for Mitral clip surgery with Md Ramírez.

## 2017-05-17 LAB
ANION GAP SERPL CALC-SCNC: 13 MMOL/L — SIGNIFICANT CHANGE UP (ref 5–17)
BUN SERPL-MCNC: 15 MG/DL — SIGNIFICANT CHANGE UP (ref 7–23)
CALCIUM SERPL-MCNC: 8 MG/DL — LOW (ref 8.4–10.5)
CHLORIDE SERPL-SCNC: 106 MMOL/L — SIGNIFICANT CHANGE UP (ref 96–108)
CO2 SERPL-SCNC: 24 MMOL/L — SIGNIFICANT CHANGE UP (ref 22–31)
CREAT SERPL-MCNC: 0.92 MG/DL — SIGNIFICANT CHANGE UP (ref 0.5–1.3)
GLUCOSE SERPL-MCNC: 124 MG/DL — HIGH (ref 70–99)
HCT VFR BLD CALC: 34.9 % — SIGNIFICANT CHANGE UP (ref 34.5–45)
HGB BLD-MCNC: 11.5 G/DL — SIGNIFICANT CHANGE UP (ref 11.5–15.5)
MCHC RBC-ENTMCNC: 28.9 PG — SIGNIFICANT CHANGE UP (ref 27–34)
MCHC RBC-ENTMCNC: 32.8 GM/DL — SIGNIFICANT CHANGE UP (ref 32–36)
MCV RBC AUTO: 88.1 FL — SIGNIFICANT CHANGE UP (ref 80–100)
PLATELET # BLD AUTO: 199 K/UL — SIGNIFICANT CHANGE UP (ref 150–400)
POTASSIUM SERPL-MCNC: 4.1 MMOL/L — SIGNIFICANT CHANGE UP (ref 3.5–5.3)
POTASSIUM SERPL-SCNC: 4.1 MMOL/L — SIGNIFICANT CHANGE UP (ref 3.5–5.3)
RBC # BLD: 3.96 M/UL — SIGNIFICANT CHANGE UP (ref 3.8–5.2)
RBC # FLD: 15.6 % — HIGH (ref 10.3–14.5)
SODIUM SERPL-SCNC: 143 MMOL/L — SIGNIFICANT CHANGE UP (ref 135–145)
WBC # BLD: 7.5 K/UL — SIGNIFICANT CHANGE UP (ref 3.8–10.5)
WBC # FLD AUTO: 7.5 K/UL — SIGNIFICANT CHANGE UP (ref 3.8–10.5)

## 2017-05-17 PROCEDURE — 93306 TTE W/DOPPLER COMPLETE: CPT | Mod: 26

## 2017-05-17 PROCEDURE — 93010 ELECTROCARDIOGRAM REPORT: CPT

## 2017-05-17 RX ORDER — METOPROLOL TARTRATE 50 MG
5 TABLET ORAL ONCE
Qty: 0 | Refills: 0 | Status: COMPLETED | OUTPATIENT
Start: 2017-05-17 | End: 2017-05-17

## 2017-05-17 RX ORDER — DOCUSATE SODIUM 100 MG
100 CAPSULE ORAL THREE TIMES A DAY
Qty: 0 | Refills: 0 | Status: DISCONTINUED | OUTPATIENT
Start: 2017-05-17 | End: 2017-05-25

## 2017-05-17 RX ORDER — DIGOXIN 250 MCG
0.25 TABLET ORAL ONCE
Qty: 0 | Refills: 0 | Status: COMPLETED | OUTPATIENT
Start: 2017-05-17 | End: 2017-05-17

## 2017-05-17 RX ORDER — DIPHENHYDRAMINE HCL 50 MG
25 CAPSULE ORAL EVERY 4 HOURS
Qty: 0 | Refills: 0 | Status: DISCONTINUED | OUTPATIENT
Start: 2017-05-17 | End: 2017-05-25

## 2017-05-17 RX ORDER — DIGOXIN 250 MCG
0.25 TABLET ORAL ONCE
Qty: 0 | Refills: 0 | Status: DISCONTINUED | OUTPATIENT
Start: 2017-05-17 | End: 2017-05-17

## 2017-05-17 RX ORDER — CLOPIDOGREL BISULFATE 75 MG/1
75 TABLET, FILM COATED ORAL DAILY
Qty: 0 | Refills: 0 | Status: DISCONTINUED | OUTPATIENT
Start: 2017-05-17 | End: 2017-05-17

## 2017-05-17 RX ORDER — VERAPAMIL HCL 240 MG
40 CAPSULE, EXTENDED RELEASE PELLETS 24 HR ORAL THREE TIMES A DAY
Qty: 0 | Refills: 0 | Status: DISCONTINUED | OUTPATIENT
Start: 2017-05-17 | End: 2017-05-17

## 2017-05-17 RX ORDER — ENOXAPARIN SODIUM 100 MG/ML
30 INJECTION SUBCUTANEOUS DAILY
Qty: 0 | Refills: 0 | Status: DISCONTINUED | OUTPATIENT
Start: 2017-05-17 | End: 2017-05-19

## 2017-05-17 RX ORDER — DIGOXIN 250 MCG
0.5 TABLET ORAL ONCE
Qty: 0 | Refills: 0 | Status: COMPLETED | OUTPATIENT
Start: 2017-05-17 | End: 2017-05-17

## 2017-05-17 RX ADMIN — SODIUM CHLORIDE 3 MILLILITER(S): 9 INJECTION INTRAMUSCULAR; INTRAVENOUS; SUBCUTANEOUS at 13:19

## 2017-05-17 RX ADMIN — Medication 0.25 MILLIGRAM(S): at 13:14

## 2017-05-17 RX ADMIN — Medication 25 MILLIGRAM(S): at 12:18

## 2017-05-17 RX ADMIN — SODIUM CHLORIDE 3 MILLILITER(S): 9 INJECTION INTRAMUSCULAR; INTRAVENOUS; SUBCUTANEOUS at 21:03

## 2017-05-17 RX ADMIN — Medication 25 MILLIGRAM(S): at 18:43

## 2017-05-17 RX ADMIN — Medication 100 MILLIGRAM(S): at 12:18

## 2017-05-17 RX ADMIN — CLOPIDOGREL BISULFATE 75 MILLIGRAM(S): 75 TABLET, FILM COATED ORAL at 12:18

## 2017-05-17 RX ADMIN — Medication 100 MILLIGRAM(S): at 21:03

## 2017-05-17 RX ADMIN — Medication 0.5 MILLIGRAM(S): at 09:00

## 2017-05-17 RX ADMIN — Medication 25 MILLIGRAM(S): at 02:52

## 2017-05-17 RX ADMIN — SODIUM CHLORIDE 3 MILLILITER(S): 9 INJECTION INTRAMUSCULAR; INTRAVENOUS; SUBCUTANEOUS at 05:35

## 2017-05-17 RX ADMIN — Medication 5 MILLIGRAM(S): at 05:35

## 2017-05-17 RX ADMIN — ENOXAPARIN SODIUM 30 MILLIGRAM(S): 100 INJECTION SUBCUTANEOUS at 12:18

## 2017-05-17 RX ADMIN — Medication 81 MILLIGRAM(S): at 12:18

## 2017-05-17 RX ADMIN — Medication 100 MILLIGRAM(S): at 04:42

## 2017-05-17 RX ADMIN — Medication 100 MILLIGRAM(S): at 13:15

## 2017-05-17 RX ADMIN — PANTOPRAZOLE SODIUM 40 MILLIGRAM(S): 20 TABLET, DELAYED RELEASE ORAL at 09:00

## 2017-05-17 RX ADMIN — Medication 25 MILLIGRAM(S): at 09:00

## 2017-05-17 RX ADMIN — Medication 40 MILLIGRAM(S): at 13:14

## 2017-05-17 RX ADMIN — Medication 25 MILLIGRAM(S): at 18:08

## 2017-05-18 LAB
ANION GAP SERPL CALC-SCNC: 9 MMOL/L — SIGNIFICANT CHANGE UP (ref 5–17)
BUN SERPL-MCNC: 17 MG/DL — SIGNIFICANT CHANGE UP (ref 7–23)
CALCIUM SERPL-MCNC: 8.1 MG/DL — LOW (ref 8.4–10.5)
CHLORIDE SERPL-SCNC: 106 MMOL/L — SIGNIFICANT CHANGE UP (ref 96–108)
CO2 SERPL-SCNC: 24 MMOL/L — SIGNIFICANT CHANGE UP (ref 22–31)
CREAT SERPL-MCNC: 1.01 MG/DL — SIGNIFICANT CHANGE UP (ref 0.5–1.3)
GLUCOSE SERPL-MCNC: 102 MG/DL — HIGH (ref 70–99)
HCT VFR BLD CALC: 33.1 % — LOW (ref 34.5–45)
HGB BLD-MCNC: 10.5 G/DL — LOW (ref 11.5–15.5)
MCHC RBC-ENTMCNC: 27.9 PG — SIGNIFICANT CHANGE UP (ref 27–34)
MCHC RBC-ENTMCNC: 31.6 GM/DL — LOW (ref 32–36)
MCV RBC AUTO: 88.2 FL — SIGNIFICANT CHANGE UP (ref 80–100)
PLATELET # BLD AUTO: 167 K/UL — SIGNIFICANT CHANGE UP (ref 150–400)
POTASSIUM SERPL-MCNC: 3.9 MMOL/L — SIGNIFICANT CHANGE UP (ref 3.5–5.3)
POTASSIUM SERPL-SCNC: 3.9 MMOL/L — SIGNIFICANT CHANGE UP (ref 3.5–5.3)
RBC # BLD: 3.75 M/UL — LOW (ref 3.8–5.2)
RBC # FLD: 15.7 % — HIGH (ref 10.3–14.5)
SODIUM SERPL-SCNC: 139 MMOL/L — SIGNIFICANT CHANGE UP (ref 135–145)
WBC # BLD: 7 K/UL — SIGNIFICANT CHANGE UP (ref 3.8–10.5)
WBC # FLD AUTO: 7 K/UL — SIGNIFICANT CHANGE UP (ref 3.8–10.5)

## 2017-05-18 RX ORDER — LISINOPRIL 2.5 MG/1
10 TABLET ORAL DAILY
Qty: 0 | Refills: 0 | Status: DISCONTINUED | OUTPATIENT
Start: 2017-05-18 | End: 2017-05-25

## 2017-05-18 RX ORDER — FUROSEMIDE 40 MG
20 TABLET ORAL DAILY
Qty: 0 | Refills: 0 | Status: DISCONTINUED | OUTPATIENT
Start: 2017-05-18 | End: 2017-05-25

## 2017-05-18 RX ORDER — POTASSIUM BICARBONATE 978 MG/1
25 TABLET, EFFERVESCENT ORAL ONCE
Qty: 0 | Refills: 0 | Status: COMPLETED | OUTPATIENT
Start: 2017-05-18 | End: 2017-05-18

## 2017-05-18 RX ADMIN — ENOXAPARIN SODIUM 30 MILLIGRAM(S): 100 INJECTION SUBCUTANEOUS at 12:11

## 2017-05-18 RX ADMIN — Medication 100 MILLIGRAM(S): at 14:56

## 2017-05-18 RX ADMIN — Medication 20 MILLIGRAM(S): at 12:10

## 2017-05-18 RX ADMIN — Medication 25 MILLIGRAM(S): at 12:10

## 2017-05-18 RX ADMIN — Medication 25 MILLIGRAM(S): at 21:55

## 2017-05-18 RX ADMIN — Medication 100 MILLIGRAM(S): at 21:55

## 2017-05-18 RX ADMIN — SODIUM CHLORIDE 3 MILLILITER(S): 9 INJECTION INTRAMUSCULAR; INTRAVENOUS; SUBCUTANEOUS at 05:01

## 2017-05-18 RX ADMIN — POTASSIUM BICARBONATE 25 MILLIEQUIVALENT(S): 978 TABLET, EFFERVESCENT ORAL at 04:39

## 2017-05-18 RX ADMIN — Medication 25 MILLIGRAM(S): at 06:16

## 2017-05-18 RX ADMIN — Medication 100 MILLIGRAM(S): at 05:03

## 2017-05-18 RX ADMIN — SODIUM CHLORIDE 3 MILLILITER(S): 9 INJECTION INTRAMUSCULAR; INTRAVENOUS; SUBCUTANEOUS at 13:52

## 2017-05-18 RX ADMIN — Medication 25 MILLIGRAM(S): at 18:25

## 2017-05-18 RX ADMIN — SODIUM CHLORIDE 3 MILLILITER(S): 9 INJECTION INTRAMUSCULAR; INTRAVENOUS; SUBCUTANEOUS at 21:56

## 2017-05-18 RX ADMIN — Medication 25 MILLIGRAM(S): at 01:34

## 2017-05-18 RX ADMIN — Medication 81 MILLIGRAM(S): at 12:10

## 2017-05-18 RX ADMIN — PANTOPRAZOLE SODIUM 40 MILLIGRAM(S): 20 TABLET, DELAYED RELEASE ORAL at 05:03

## 2017-05-18 RX ADMIN — LISINOPRIL 10 MILLIGRAM(S): 2.5 TABLET ORAL at 14:56

## 2017-05-18 NOTE — PROVIDER CONTACT NOTE (OTHER) - SITUATION
Cardiac monitor shows 24 bts of WCT asymptomatic..  Patient asleep at that time. Cardiac monitor shows 24 bts of WCT asymptomatic..  Patient asleep at that time. No SOB noted.

## 2017-05-18 NOTE — PROVIDER CONTACT NOTE (OTHER) - ASSESSMENT
Patient responsive to all stimuli. Oriented x3. Skin warm and dry to touch. Respiration regular and easy. Patient responsive to all stimuli. Oriented x3. Skin warm and dry to touch. Respiration regular and easy. VS /[id number]  O2 sat.

## 2017-05-19 LAB
ANION GAP SERPL CALC-SCNC: 13 MMOL/L — SIGNIFICANT CHANGE UP (ref 5–17)
APTT BLD: 98.4 SEC — HIGH (ref 27.5–37.4)
BUN SERPL-MCNC: 19 MG/DL — SIGNIFICANT CHANGE UP (ref 7–23)
CALCIUM SERPL-MCNC: 8.2 MG/DL — LOW (ref 8.4–10.5)
CHLORIDE SERPL-SCNC: 103 MMOL/L — SIGNIFICANT CHANGE UP (ref 96–108)
CO2 SERPL-SCNC: 24 MMOL/L — SIGNIFICANT CHANGE UP (ref 22–31)
CREAT SERPL-MCNC: 1.03 MG/DL — SIGNIFICANT CHANGE UP (ref 0.5–1.3)
GLUCOSE SERPL-MCNC: 103 MG/DL — HIGH (ref 70–99)
HCT VFR BLD CALC: 34.6 % — SIGNIFICANT CHANGE UP (ref 34.5–45)
HGB BLD-MCNC: 11.6 G/DL — SIGNIFICANT CHANGE UP (ref 11.5–15.5)
MCHC RBC-ENTMCNC: 29.6 PG — SIGNIFICANT CHANGE UP (ref 27–34)
MCHC RBC-ENTMCNC: 33.5 GM/DL — SIGNIFICANT CHANGE UP (ref 32–36)
MCV RBC AUTO: 88.3 FL — SIGNIFICANT CHANGE UP (ref 80–100)
PLATELET # BLD AUTO: 172 K/UL — SIGNIFICANT CHANGE UP (ref 150–400)
POTASSIUM SERPL-MCNC: 3.8 MMOL/L — SIGNIFICANT CHANGE UP (ref 3.5–5.3)
POTASSIUM SERPL-SCNC: 3.8 MMOL/L — SIGNIFICANT CHANGE UP (ref 3.5–5.3)
RBC # BLD: 3.92 M/UL — SIGNIFICANT CHANGE UP (ref 3.8–5.2)
RBC # FLD: 15.3 % — HIGH (ref 10.3–14.5)
SODIUM SERPL-SCNC: 140 MMOL/L — SIGNIFICANT CHANGE UP (ref 135–145)
WBC # BLD: 7 K/UL — SIGNIFICANT CHANGE UP (ref 3.8–10.5)
WBC # FLD AUTO: 7 K/UL — SIGNIFICANT CHANGE UP (ref 3.8–10.5)

## 2017-05-19 RX ORDER — METOPROLOL TARTRATE 50 MG
25 TABLET ORAL
Qty: 0 | Refills: 0 | Status: DISCONTINUED | OUTPATIENT
Start: 2017-05-19 | End: 2017-05-21

## 2017-05-19 RX ORDER — POTASSIUM CHLORIDE 20 MEQ
20 PACKET (EA) ORAL
Qty: 0 | Refills: 0 | Status: COMPLETED | OUTPATIENT
Start: 2017-05-19 | End: 2017-05-19

## 2017-05-19 RX ORDER — HEPARIN SODIUM 5000 [USP'U]/ML
INJECTION INTRAVENOUS; SUBCUTANEOUS
Qty: 25000 | Refills: 0 | Status: DISCONTINUED | OUTPATIENT
Start: 2017-05-19 | End: 2017-05-22

## 2017-05-19 RX ORDER — HYDRALAZINE HCL 50 MG
50 TABLET ORAL EVERY 6 HOURS
Qty: 0 | Refills: 0 | Status: DISCONTINUED | OUTPATIENT
Start: 2017-05-19 | End: 2017-05-25

## 2017-05-19 RX ADMIN — ENOXAPARIN SODIUM 30 MILLIGRAM(S): 100 INJECTION SUBCUTANEOUS at 11:24

## 2017-05-19 RX ADMIN — Medication 100 MILLIGRAM(S): at 21:29

## 2017-05-19 RX ADMIN — Medication 50 MILLIGRAM(S): at 21:29

## 2017-05-19 RX ADMIN — Medication 81 MILLIGRAM(S): at 11:23

## 2017-05-19 RX ADMIN — Medication 25 MILLIGRAM(S): at 06:33

## 2017-05-19 RX ADMIN — Medication 25 MILLIGRAM(S): at 16:47

## 2017-05-19 RX ADMIN — Medication 20 MILLIEQUIVALENT(S): at 11:24

## 2017-05-19 RX ADMIN — Medication 50 MILLIGRAM(S): at 16:48

## 2017-05-19 RX ADMIN — SODIUM CHLORIDE 3 MILLILITER(S): 9 INJECTION INTRAMUSCULAR; INTRAVENOUS; SUBCUTANEOUS at 21:21

## 2017-05-19 RX ADMIN — Medication 100 MILLIGRAM(S): at 06:33

## 2017-05-19 RX ADMIN — Medication 50 MILLIGRAM(S): at 11:22

## 2017-05-19 RX ADMIN — Medication 20 MILLIGRAM(S): at 06:33

## 2017-05-19 RX ADMIN — Medication 20 MILLIEQUIVALENT(S): at 16:48

## 2017-05-19 RX ADMIN — HEPARIN SODIUM 1200 UNIT(S)/HR: 5000 INJECTION INTRAVENOUS; SUBCUTANEOUS at 16:30

## 2017-05-19 RX ADMIN — SODIUM CHLORIDE 3 MILLILITER(S): 9 INJECTION INTRAMUSCULAR; INTRAVENOUS; SUBCUTANEOUS at 06:33

## 2017-05-19 RX ADMIN — HEPARIN SODIUM 1200 UNIT(S)/HR: 5000 INJECTION INTRAVENOUS; SUBCUTANEOUS at 23:25

## 2017-05-19 RX ADMIN — SODIUM CHLORIDE 3 MILLILITER(S): 9 INJECTION INTRAMUSCULAR; INTRAVENOUS; SUBCUTANEOUS at 11:17

## 2017-05-19 RX ADMIN — LISINOPRIL 10 MILLIGRAM(S): 2.5 TABLET ORAL at 06:32

## 2017-05-19 RX ADMIN — PANTOPRAZOLE SODIUM 40 MILLIGRAM(S): 20 TABLET, DELAYED RELEASE ORAL at 06:32

## 2017-05-19 RX ADMIN — Medication 100 MILLIGRAM(S): at 11:24

## 2017-05-20 LAB
ANION GAP SERPL CALC-SCNC: 14 MMOL/L — SIGNIFICANT CHANGE UP (ref 5–17)
APTT BLD: 174.3 SEC — CRITICAL HIGH (ref 27.5–37.4)
APTT BLD: 67.7 SEC — HIGH (ref 27.5–37.4)
APTT BLD: 77.4 SEC — HIGH (ref 27.5–37.4)
BUN SERPL-MCNC: 20 MG/DL — SIGNIFICANT CHANGE UP (ref 7–23)
CALCIUM SERPL-MCNC: 8.1 MG/DL — LOW (ref 8.4–10.5)
CHLORIDE SERPL-SCNC: 103 MMOL/L — SIGNIFICANT CHANGE UP (ref 96–108)
CO2 SERPL-SCNC: 23 MMOL/L — SIGNIFICANT CHANGE UP (ref 22–31)
CREAT SERPL-MCNC: 0.98 MG/DL — SIGNIFICANT CHANGE UP (ref 0.5–1.3)
GLUCOSE SERPL-MCNC: 95 MG/DL — SIGNIFICANT CHANGE UP (ref 70–99)
HCT VFR BLD CALC: 37.8 % — SIGNIFICANT CHANGE UP (ref 34.5–45)
HGB BLD-MCNC: 12.2 G/DL — SIGNIFICANT CHANGE UP (ref 11.5–15.5)
INR BLD: 1.33 RATIO — HIGH (ref 0.88–1.16)
MCHC RBC-ENTMCNC: 28.5 PG — SIGNIFICANT CHANGE UP (ref 27–34)
MCHC RBC-ENTMCNC: 32.2 GM/DL — SIGNIFICANT CHANGE UP (ref 32–36)
MCV RBC AUTO: 88.5 FL — SIGNIFICANT CHANGE UP (ref 80–100)
PLATELET # BLD AUTO: 176 K/UL — SIGNIFICANT CHANGE UP (ref 150–400)
POTASSIUM SERPL-MCNC: 4 MMOL/L — SIGNIFICANT CHANGE UP (ref 3.5–5.3)
POTASSIUM SERPL-SCNC: 4 MMOL/L — SIGNIFICANT CHANGE UP (ref 3.5–5.3)
PROTHROM AB SERPL-ACNC: 14.4 SEC — HIGH (ref 9.8–12.7)
RBC # BLD: 4.28 M/UL — SIGNIFICANT CHANGE UP (ref 3.8–5.2)
RBC # FLD: 15.4 % — HIGH (ref 10.3–14.5)
SODIUM SERPL-SCNC: 140 MMOL/L — SIGNIFICANT CHANGE UP (ref 135–145)
WBC # BLD: 6.3 K/UL — SIGNIFICANT CHANGE UP (ref 3.8–10.5)
WBC # FLD AUTO: 6.3 K/UL — SIGNIFICANT CHANGE UP (ref 3.8–10.5)

## 2017-05-20 RX ADMIN — Medication 20 MILLIGRAM(S): at 06:00

## 2017-05-20 RX ADMIN — PANTOPRAZOLE SODIUM 40 MILLIGRAM(S): 20 TABLET, DELAYED RELEASE ORAL at 06:00

## 2017-05-20 RX ADMIN — Medication 81 MILLIGRAM(S): at 12:29

## 2017-05-20 RX ADMIN — Medication 50 MILLIGRAM(S): at 23:04

## 2017-05-20 RX ADMIN — Medication 25 MILLIGRAM(S): at 17:31

## 2017-05-20 RX ADMIN — Medication 50 MILLIGRAM(S): at 17:31

## 2017-05-20 RX ADMIN — HEPARIN SODIUM 1000 UNIT(S)/HR: 5000 INJECTION INTRAVENOUS; SUBCUTANEOUS at 08:30

## 2017-05-20 RX ADMIN — Medication 100 MILLIGRAM(S): at 14:43

## 2017-05-20 RX ADMIN — LISINOPRIL 10 MILLIGRAM(S): 2.5 TABLET ORAL at 05:59

## 2017-05-20 RX ADMIN — HEPARIN SODIUM 1000 UNIT(S)/HR: 5000 INJECTION INTRAVENOUS; SUBCUTANEOUS at 15:25

## 2017-05-20 RX ADMIN — Medication 50 MILLIGRAM(S): at 06:00

## 2017-05-20 RX ADMIN — Medication 25 MILLIGRAM(S): at 06:00

## 2017-05-20 RX ADMIN — SODIUM CHLORIDE 3 MILLILITER(S): 9 INJECTION INTRAMUSCULAR; INTRAVENOUS; SUBCUTANEOUS at 21:27

## 2017-05-20 RX ADMIN — SODIUM CHLORIDE 3 MILLILITER(S): 9 INJECTION INTRAMUSCULAR; INTRAVENOUS; SUBCUTANEOUS at 13:16

## 2017-05-20 RX ADMIN — HEPARIN SODIUM 1000 UNIT(S)/HR: 5000 INJECTION INTRAVENOUS; SUBCUTANEOUS at 23:03

## 2017-05-20 RX ADMIN — Medication 100 MILLIGRAM(S): at 05:59

## 2017-05-20 RX ADMIN — SODIUM CHLORIDE 3 MILLILITER(S): 9 INJECTION INTRAMUSCULAR; INTRAVENOUS; SUBCUTANEOUS at 05:58

## 2017-05-20 RX ADMIN — Medication 50 MILLIGRAM(S): at 12:29

## 2017-05-20 RX ADMIN — HEPARIN SODIUM 0 UNIT(S)/HR: 5000 INJECTION INTRAVENOUS; SUBCUTANEOUS at 07:28

## 2017-05-20 NOTE — PHYSICAL THERAPY INITIAL EVALUATION ADULT - ADDITIONAL COMMENTS
Pt. resides alone in private home with stairs and railing.  Pt. reports was using a rolling walker and receiving Home PT prior to admission.  Pt. can stay on first floor of home but has walk-in shower on second floor which she prefers to use

## 2017-05-20 NOTE — PROVIDER CONTACT NOTE (CHANGE IN STATUS NOTIFICATION) - ASSESSMENT
Pt with erythema, warmth and hard to touch noted on R arm, pt has no c/o pain at site,positive radial pulse noted

## 2017-05-21 LAB
ANION GAP SERPL CALC-SCNC: 13 MMOL/L — SIGNIFICANT CHANGE UP (ref 5–17)
APTT BLD: 87.7 SEC — HIGH (ref 27.5–37.4)
BUN SERPL-MCNC: 22 MG/DL — SIGNIFICANT CHANGE UP (ref 7–23)
CALCIUM SERPL-MCNC: 8.4 MG/DL — SIGNIFICANT CHANGE UP (ref 8.4–10.5)
CHLORIDE SERPL-SCNC: 99 MMOL/L — SIGNIFICANT CHANGE UP (ref 96–108)
CO2 SERPL-SCNC: 25 MMOL/L — SIGNIFICANT CHANGE UP (ref 22–31)
CREAT SERPL-MCNC: 0.95 MG/DL — SIGNIFICANT CHANGE UP (ref 0.5–1.3)
GLUCOSE SERPL-MCNC: 100 MG/DL — HIGH (ref 70–99)
HCT VFR BLD CALC: 34.6 % — SIGNIFICANT CHANGE UP (ref 34.5–45)
HGB BLD-MCNC: 11.1 G/DL — LOW (ref 11.5–15.5)
INR BLD: 1.25 RATIO — HIGH (ref 0.88–1.16)
MCHC RBC-ENTMCNC: 28.1 PG — SIGNIFICANT CHANGE UP (ref 27–34)
MCHC RBC-ENTMCNC: 32.2 GM/DL — SIGNIFICANT CHANGE UP (ref 32–36)
MCV RBC AUTO: 87.3 FL — SIGNIFICANT CHANGE UP (ref 80–100)
PLATELET # BLD AUTO: 174 K/UL — SIGNIFICANT CHANGE UP (ref 150–400)
POTASSIUM SERPL-MCNC: 3.9 MMOL/L — SIGNIFICANT CHANGE UP (ref 3.5–5.3)
POTASSIUM SERPL-SCNC: 3.9 MMOL/L — SIGNIFICANT CHANGE UP (ref 3.5–5.3)
PROTHROM AB SERPL-ACNC: 13.7 SEC — HIGH (ref 9.8–12.7)
RBC # BLD: 3.97 M/UL — SIGNIFICANT CHANGE UP (ref 3.8–5.2)
RBC # FLD: 15.4 % — HIGH (ref 10.3–14.5)
SODIUM SERPL-SCNC: 137 MMOL/L — SIGNIFICANT CHANGE UP (ref 135–145)
WBC # BLD: 7 K/UL — SIGNIFICANT CHANGE UP (ref 3.8–10.5)
WBC # FLD AUTO: 7 K/UL — SIGNIFICANT CHANGE UP (ref 3.8–10.5)

## 2017-05-21 RX ORDER — METOPROLOL TARTRATE 50 MG
25 TABLET ORAL EVERY 8 HOURS
Qty: 0 | Refills: 0 | Status: DISCONTINUED | OUTPATIENT
Start: 2017-05-21 | End: 2017-05-21

## 2017-05-21 RX ORDER — METOPROLOL TARTRATE 50 MG
50 TABLET ORAL
Qty: 0 | Refills: 0 | Status: DISCONTINUED | OUTPATIENT
Start: 2017-05-21 | End: 2017-05-24

## 2017-05-21 RX ADMIN — LISINOPRIL 10 MILLIGRAM(S): 2.5 TABLET ORAL at 06:09

## 2017-05-21 RX ADMIN — SODIUM CHLORIDE 3 MILLILITER(S): 9 INJECTION INTRAMUSCULAR; INTRAVENOUS; SUBCUTANEOUS at 06:07

## 2017-05-21 RX ADMIN — Medication 50 MILLIGRAM(S): at 06:09

## 2017-05-21 RX ADMIN — Medication 50 MILLIGRAM(S): at 18:44

## 2017-05-21 RX ADMIN — Medication 25 MILLIGRAM(S): at 06:09

## 2017-05-21 RX ADMIN — Medication 25 MILLIGRAM(S): at 14:11

## 2017-05-21 RX ADMIN — SODIUM CHLORIDE 3 MILLILITER(S): 9 INJECTION INTRAMUSCULAR; INTRAVENOUS; SUBCUTANEOUS at 21:33

## 2017-05-21 RX ADMIN — Medication 50 MILLIGRAM(S): at 12:19

## 2017-05-21 RX ADMIN — HEPARIN SODIUM 1000 UNIT(S)/HR: 5000 INJECTION INTRAVENOUS; SUBCUTANEOUS at 06:12

## 2017-05-21 RX ADMIN — Medication 100 MILLIGRAM(S): at 21:34

## 2017-05-21 RX ADMIN — Medication 81 MILLIGRAM(S): at 12:19

## 2017-05-21 RX ADMIN — Medication 20 MILLIGRAM(S): at 06:09

## 2017-05-21 RX ADMIN — PANTOPRAZOLE SODIUM 40 MILLIGRAM(S): 20 TABLET, DELAYED RELEASE ORAL at 06:09

## 2017-05-21 RX ADMIN — SODIUM CHLORIDE 3 MILLILITER(S): 9 INJECTION INTRAMUSCULAR; INTRAVENOUS; SUBCUTANEOUS at 14:10

## 2017-05-21 RX ADMIN — Medication 50 MILLIGRAM(S): at 18:42

## 2017-05-21 NOTE — PROVIDER CONTACT NOTE (OTHER) - BACKGROUND
5/16 Mitral clip
05/15/17 S/P mitral clip.  Hx of CABG 2013,CAD, GERD, HTN, HLD, Afib,  Breast CA with resection.
5/16 Mitral clip 5/17 Hr dropped to 35 Hx: CABg, PAF, CAD, arrythmia, GERD, gout
mitral clip, dig loading

## 2017-05-21 NOTE — PROVIDER CONTACT NOTE (OTHER) - ASSESSMENT
Small hematoma noted to right groin. No change in size this am. VSS. Palpable pulses present. denies any ill feelings or c/o pain.

## 2017-05-21 NOTE — PROVIDER CONTACT NOTE (OTHER) - ACTION/TREATMENT ORDERED:
No orders at this time.
No orders for intervention at this time. Lopressor increased to 25mg PO Q8hr.
R2 pads on, continue to monitor
Blood drawn and sent to lab. Awaiting results. Will continue cardiac monitoring and plan of care.

## 2017-05-21 NOTE — PROVIDER CONTACT NOTE (OTHER) - SITUATION
Small hematoma to right groin remains unchanged this am. Pt would like NP to reassess groin this am.

## 2017-05-21 NOTE — PROVIDER CONTACT NOTE (OTHER) - ASSESSMENT
Asymptomatic. /70, Resp 18/min, HR 94, O2 sat 96% RA. Asymptomatic. /70, Resp 18/min, HR 94, O2 sat 96% RA. K+ 4.0

## 2017-05-22 ENCOUNTER — TRANSCRIPTION ENCOUNTER (OUTPATIENT)
Age: 82
End: 2017-05-22

## 2017-05-22 LAB
ANION GAP SERPL CALC-SCNC: 14 MMOL/L — SIGNIFICANT CHANGE UP (ref 5–17)
APTT BLD: 88.9 SEC — HIGH (ref 27.5–37.4)
BUN SERPL-MCNC: 20 MG/DL — SIGNIFICANT CHANGE UP (ref 7–23)
CALCIUM SERPL-MCNC: 8.5 MG/DL — SIGNIFICANT CHANGE UP (ref 8.4–10.5)
CHLORIDE SERPL-SCNC: 96 MMOL/L — SIGNIFICANT CHANGE UP (ref 96–108)
CO2 SERPL-SCNC: 25 MMOL/L — SIGNIFICANT CHANGE UP (ref 22–31)
CREAT SERPL-MCNC: 0.9 MG/DL — SIGNIFICANT CHANGE UP (ref 0.5–1.3)
GLUCOSE SERPL-MCNC: 103 MG/DL — HIGH (ref 70–99)
HCT VFR BLD CALC: 34.2 % — LOW (ref 34.5–45)
HGB BLD-MCNC: 11 G/DL — LOW (ref 11.5–15.5)
INR BLD: 1.29 RATIO — HIGH (ref 0.88–1.16)
MCHC RBC-ENTMCNC: 28.1 PG — SIGNIFICANT CHANGE UP (ref 27–34)
MCHC RBC-ENTMCNC: 32.2 GM/DL — SIGNIFICANT CHANGE UP (ref 32–36)
MCV RBC AUTO: 87.1 FL — SIGNIFICANT CHANGE UP (ref 80–100)
PLATELET # BLD AUTO: 143 K/UL — LOW (ref 150–400)
POTASSIUM SERPL-MCNC: 3.8 MMOL/L — SIGNIFICANT CHANGE UP (ref 3.5–5.3)
POTASSIUM SERPL-SCNC: 3.8 MMOL/L — SIGNIFICANT CHANGE UP (ref 3.5–5.3)
PROTHROM AB SERPL-ACNC: 14 SEC — HIGH (ref 9.8–12.7)
RBC # BLD: 3.93 M/UL — SIGNIFICANT CHANGE UP (ref 3.8–5.2)
RBC # FLD: 15.4 % — HIGH (ref 10.3–14.5)
SODIUM SERPL-SCNC: 135 MMOL/L — SIGNIFICANT CHANGE UP (ref 135–145)
WBC # BLD: 6.9 K/UL — SIGNIFICANT CHANGE UP (ref 3.8–10.5)
WBC # FLD AUTO: 6.9 K/UL — SIGNIFICANT CHANGE UP (ref 3.8–10.5)

## 2017-05-22 PROCEDURE — 93620 COMP EP EVL R AT VEN PAC&REC: CPT | Mod: 26

## 2017-05-22 RX ORDER — HEPARIN SODIUM 5000 [USP'U]/ML
1000 INJECTION INTRAVENOUS; SUBCUTANEOUS
Qty: 25000 | Refills: 0 | Status: DISCONTINUED | OUTPATIENT
Start: 2017-05-22 | End: 2017-05-23

## 2017-05-22 RX ADMIN — Medication 100 MILLIGRAM(S): at 05:42

## 2017-05-22 RX ADMIN — HEPARIN SODIUM 1000 UNIT(S)/HR: 5000 INJECTION INTRAVENOUS; SUBCUTANEOUS at 22:35

## 2017-05-22 RX ADMIN — Medication 50 MILLIGRAM(S): at 00:45

## 2017-05-22 RX ADMIN — SODIUM CHLORIDE 3 MILLILITER(S): 9 INJECTION INTRAMUSCULAR; INTRAVENOUS; SUBCUTANEOUS at 22:01

## 2017-05-22 RX ADMIN — Medication 50 MILLIGRAM(S): at 18:21

## 2017-05-22 RX ADMIN — Medication 100 MILLIGRAM(S): at 11:24

## 2017-05-22 RX ADMIN — SODIUM CHLORIDE 3 MILLILITER(S): 9 INJECTION INTRAMUSCULAR; INTRAVENOUS; SUBCUTANEOUS at 05:45

## 2017-05-22 RX ADMIN — HEPARIN SODIUM 1000 UNIT(S)/HR: 5000 INJECTION INTRAVENOUS; SUBCUTANEOUS at 07:38

## 2017-05-22 RX ADMIN — Medication 81 MILLIGRAM(S): at 11:23

## 2017-05-22 RX ADMIN — SODIUM CHLORIDE 3 MILLILITER(S): 9 INJECTION INTRAMUSCULAR; INTRAVENOUS; SUBCUTANEOUS at 11:24

## 2017-05-22 RX ADMIN — PANTOPRAZOLE SODIUM 40 MILLIGRAM(S): 20 TABLET, DELAYED RELEASE ORAL at 05:42

## 2017-05-22 RX ADMIN — Medication 50 MILLIGRAM(S): at 05:42

## 2017-05-22 RX ADMIN — Medication 50 MILLIGRAM(S): at 11:23

## 2017-05-22 RX ADMIN — Medication 20 MILLIGRAM(S): at 05:42

## 2017-05-22 RX ADMIN — Medication 100 MILLIGRAM(S): at 22:02

## 2017-05-22 RX ADMIN — LISINOPRIL 10 MILLIGRAM(S): 2.5 TABLET ORAL at 05:42

## 2017-05-22 NOTE — DISCHARGE NOTE ADULT - REASON FOR ADMISSION
D/A for mitral clip 5/16/17 s/p Mitral Clip via Left TF approach 5/16/17 s/p Mitral Clip via Right TF approach

## 2017-05-22 NOTE — DISCHARGE NOTE ADULT - PLAN OF CARE
Recovery 1. Daily Shower  2. Weight yourself daily and notify any weight gain greater than 2-3 pounds in 24 hours.  3. Regular diet - low fat, low cholesterol, no added salt.  4. Cleanse Midsternal incision and leg incision daily while showering with warm water and mild soap, pat dry and maintain open to air.   5. Follow Cardiac Surgery Do's and Don'ts discharge instructions.   6. No driving until cleared by MD.   7. No heavy lifting nothing greater than 5 pounds until cleared by MD.   8. Call / Notify MD any fever greater than 101.0  9. Increase Activity as tolerated. 1. Daily Shower  2. Weight yourself daily and notify any weight gain greater than 2-3 pounds in 24 hours.  3. Regular diet - low fat, low cholesterol, no added salt.  4. Cleanse Right groin incision daily while showering with warm water and mild soap, pat dry and maintain open to air.   5. Follow Cardiac Surgery Do's and Don'ts discharge instructions.   6. No driving until cleared by MD.   7. No heavy lifting nothing greater than 5 pounds until cleared by MD.   8. Call / Notify MD any fever greater than 101.0  9. Increase Activity as tolerated.

## 2017-05-22 NOTE — DISCHARGE NOTE ADULT - CARE PLAN
Principal Discharge DX:	S/P mitral valve clip implantation  Goal:	Recovery  Instructions for follow-up, activity and diet:	1. Daily Shower  2. Weight yourself daily and notify any weight gain greater than 2-3 pounds in 24 hours.  3. Regular diet - low fat, low cholesterol, no added salt.  4. Cleanse Midsternal incision and leg incision daily while showering with warm water and mild soap, pat dry and maintain open to air.   5. Follow Cardiac Surgery Do's and Don'ts discharge instructions.   6. No driving until cleared by MD.   7. No heavy lifting nothing greater than 5 pounds until cleared by MD.   8. Call / Notify MD any fever greater than 101.0  9. Increase Activity as tolerated. Principal Discharge DX:	S/P mitral valve clip implantation  Goal:	Recovery  Instructions for follow-up, activity and diet:	1. Daily Shower  2. Weight yourself daily and notify any weight gain greater than 2-3 pounds in 24 hours.  3. Regular diet - low fat, low cholesterol, no added salt.  4. Cleanse Right groin incision daily while showering with warm water and mild soap, pat dry and maintain open to air.   5. Follow Cardiac Surgery Do's and Don'ts discharge instructions.   6. No driving until cleared by MD.   7. No heavy lifting nothing greater than 5 pounds until cleared by MD.   8. Call / Notify MD any fever greater than 101.0  9. Increase Activity as tolerated.

## 2017-05-22 NOTE — PATIENT PROFILE ADULT. - PURPOSEFUL PROACTIVE ROUNDING
Problem: Patient Care Overview (Infant)  Goal: Plan of Care Review  Outcome: Ongoing (interventions implemented as appropriate)    04/15/17 0804   Coping/Psychosocial Response   Care Plan Reviewed With mother;father   Patient Care Overview   Progress improving   Outcome Evaluation   Outcome Summary/Follow up Plan VSS. Stooled, but no void this shift. Assisted mom and baby with BFing during the shift.       Goal: Infant Individualization and Mutuality  Outcome: Ongoing (interventions implemented as appropriate)  Goal: Discharge Needs Assessment  Outcome: Ongoing (interventions implemented as appropriate)    Problem:  (,NICU)  Goal: Signs and Symptoms of Listed Potential Problems Will be Absent or Manageable ([place])  Outcome: Ongoing (interventions implemented as appropriate)    Problem: Breastfeeding (Adult,NICU,,Obstetrics,Pediatric)  Goal: Signs and Symptoms of Listed Potential Problems Will be Absent or Manageable (Breastfeeding)  Outcome: Ongoing (interventions implemented as appropriate)      
Problem: Patient Care Overview (Infant)  Goal: Plan of Care Review  Outcome: Outcome(s) achieved Date Met:  17 143   Coping/Psychosocial Response   Care Plan Reviewed With mother;father   Patient Care Overview   Progress improving   Outcome Evaluation   Outcome Summary/Follow up Plan Ready for discharge; Parents instructed to follow up with VERA tomorrow       Goal: Infant Individualization and Mutuality  Outcome: Outcome(s) achieved Date Met:  17 143   Individualization   Patient Specific Goals Discharge home with mom       Goal: Discharge Needs Assessment  Outcome: Outcome(s) achieved Date Met:  17 143   Discharge Needs Assessment   Concerns To Be Addressed no discharge needs identified   Readmission Within The Last 30 Days no previous admission in last 30 days   Equipment Needed After Discharge none   Current Health   Anticipated Changes Related to Illness none   Self-Care   Equipment Currently Used at Home none   Living Environment   Transportation Available family or friend will provide         Problem: [place]  Goal: Signs and Symptoms of Listed Potential Problems Will be Absent or Manageable ()  Outcome: Outcome(s) achieved Date Met:  17 143      Problems Assessed () all   Problems Present () none           
Caregiver/son/Patient

## 2017-05-22 NOTE — DISCHARGE NOTE ADULT - PATIENT PORTAL LINK FT
“You can access the FollowHealth Patient Portal, offered by [facility], by registering with the following website: [email or web address]”

## 2017-05-22 NOTE — DISCHARGE NOTE ADULT - MEDICATION SUMMARY - MEDICATIONS TO STOP TAKING
I will STOP taking the medications listed below when I get home from the hospital:    metoprolol succinate 50 mg oral tablet, extended release  -- 1 tab(s) by mouth once a day    hydrALAZINE 25 mg oral tablet  -- 1 tab(s) by mouth 2 times a day

## 2017-05-22 NOTE — DISCHARGE NOTE ADULT - CARE PROVIDERS DIRECT ADDRESSES
[email or web address]

## 2017-05-22 NOTE — DISCHARGE NOTE ADULT - PROVIDER TOKENS
MAGDI:'[id number]',MAGDI:'[id number]' TOKEN:'[id number]',TOKEN:'[id number]:'[id number]:'[id number]'

## 2017-05-22 NOTE — DISCHARGE NOTE ADULT - MEDICATION SUMMARY - MEDICATIONS TO TAKE
I will START or STAY ON the medications listed below when I get home from the hospital:    aspirin 81 mg oral delayed release tablet  -- 1 tab(s) by mouth once a day  -- Indication: For Nonrheumatic mitral valve disorder    acetaminophen-oxycodone 325 mg-5 mg oral tablet  -- 1 tab(s) by mouth every 6 hours, As needed, Moderate Pain (4 - 6)  -- Indication: For pain    lisinopril 10 mg oral tablet  -- 1 tab(s) by mouth once a day  -- Indication: For hypertension     rivaroxaban 15 mg oral tablet  -- 1 tab(s) by mouth once a day  -- Indication: For blood thinner    diphenhydrAMINE 25 mg oral capsule  -- 1 cap(s) by mouth every 4 hours, As needed, Rash and/or Itching  -- Indication: For itch     Zetia 10 mg oral tablet  -- 1 tab(s) by mouth once a day (at bedtime)  -- Indication: For cholesterol     metoprolol succinate 100 mg oral tablet, extended release  -- 1 tab(s) by mouth once a day  -- Indication: For hypertension     furosemide 20 mg oral tablet  -- 1 tab(s) by mouth once a day  -- Indication: For water pill     docusate sodium 100 mg oral capsule  -- 1 cap(s) by mouth 3 times a day  -- Indication: For Stool softener     potassium chloride 10 mEq oral tablet, extended release  -- 1 tab(s) by mouth once a day  -- Indication: For electrolyte for water pill     pantoprazole 40 mg oral delayed release tablet  -- 1 tab(s) by mouth once a day (before a meal)  -- Indication: For heart burn    hydrALAZINE 50 mg oral tablet  -- 1 tab(s) by mouth every 6 hours  -- Indication: For blood pressure

## 2017-05-22 NOTE — DISCHARGE NOTE ADULT - HOSPITAL COURSE
This is a 85 y/o F with PMx of breast ca (dx'ed in July, s/p resection, was on chemo, now in remission as per patient), HTN, HLD, PAF(on Eliquis last dose 1.5 months ago, it was stopped r/t blood in urine- no further bleeding noted since Eliquis was stopped), seen by Dr. Cowan (CT surg) dx with new mitral regurg.  Patient currently denies chest pain, SOB, dizziness, weakness, and fatigue. no fever/chill, N&V, HA.  Presents here today for Mitral clip surgery with Dr. Ramírez.  On 5/16/17 patient underwent mitral valve clip via left TF approach.  EF 35%.  Post op Course: LEXY à started on verapamil; Aflutter / WCT down to 30’s; verapamil D/C’d.  EP following.  AC therapy initiated. On 5/22 s/p EPS: inducible VT; plan for AICD placement.  On 5/23/17 s/p AICD implant. BB increased. AC therapy resumed for h/o PAF.  5/24 c/o RLE heel pain; patient reports h/o heel spurs. Motrin 400mg given x 1 w/ improvement of symptom.  Discharge home 5/25/17. This is a 85 y/o F with PMx of breast ca (dx'ed in July, s/p resection, was on chemo, now in remission as per patient), HTN, HLD, PAF(on Eliquis last dose 1.5 months ago, it was stopped r/t blood in urine- no further bleeding noted since Eliquis was stopped), seen by Dr. Cowan (CT surg) dx with new mitral regurg.  Patient currently denies chest pain, SOB, dizziness, weakness, and fatigue. no fever/chill, N&V, HA.  Presents here today for Mitral clip surgery with Dr. Ramírez.  On 5/16/17 patient underwent mitral valve clip via left TF approach.  EF 35%.  Post op Course: LEXY à started on verapamil; Aflutter / WCT down to 30’s; verapamil D/C’d.  EP following.  AC therapy initiated on heparin gtt. 4/17 Post op TTE: EF 40%; s/p mitral clip mild to moderate MR. Moderate to severe LV systolic dysfunction. Mild to moderate TR. Negative for pericardial effusion. On 5/22 s/p EPS: inducible VT; plan for AICD placement.  On 5/23/17 s/p AICD implant. BB increased. AC therapy resumed for h/o PAF; started on Xarelto 15mg PO daily.  5/24 c/o RLE heel pain; patient reports h/o heel spurs. Motrin 400mg given x 1 w/ improvement of symptom.  Discharge home 5/25/17. This is a 83 y/o F with PMx of breast ca (dx'ed in July, s/p resection, was on chemo, now in remission as per patient), HTN, HLD, PAF(on Eliquis last dose 1.5 months ago, it was stopped r/t blood in urine- no further bleeding noted since Eliquis was stopped), seen by Dr. Cowan (CT surg) dx with new mitral regurg.  Patient currently denies chest pain, SOB, dizziness, weakness, and fatigue. no fever/chill, N&V, HA.  Presents here today for Mitral clip surgery with Dr. Ramírez.  On 5/16/17 patient underwent mitral valve clip via left TF approach.  EF 35%.  Post op Course: LEXY à started on verapamil; Aflutter / WCT down to 30’s; verapamil D/C’d.  EP following.  AC therapy initiated on heparin gtt. 4/17 Post op TTE: EF 40%; s/p mitral clip mild to moderate MR. Moderate to severe LV systolic dysfunction. Mild to moderate TR. Negative for pericardial effusion. On 5/22 s/p EPS: inducible VT; plan for AICD placement.  On 5/23/17 s/p AICD implant. BB increased. AC therapy resumed for h/o PAF; started on Xarelto 15mg PO daily.  5/24 c/o RLE heel pain; patient reports h/o heel spurs. Motrin 400mg given x 1 w/ improvement of symptom.  Discharged to rehab 5/25/17. Right groin small hematoma.

## 2017-05-22 NOTE — DISCHARGE NOTE ADULT - HOME CARE AGENCY
[facility] [phone]. for RN visit to assess for home P/T for start of care the day after discharge

## 2017-05-22 NOTE — DISCHARGE NOTE ADULT - NS AS ACTIVITY OBS
Do not make important decisions/Do not drive or operate machinery/Stairs allowed/Walking-Outdoors allowed/Showering allowed/Walking-Indoors allowed/No Heavy lifting/straining

## 2017-05-22 NOTE — DISCHARGE NOTE ADULT - ADDITIONAL INSTRUCTIONS
1. Please schedule an appointment with your Interventional Cardiologist Dr. Horton in 1-2weeks, please call the office to schedule an appointment at [phone].  2. Please schedule an appointment with your PCP in 1-2 weeks, call the office to schedule an appointment.   3. Please schedule an appointment with your Cardiologist Dr. Jose Wynn in 2 weeks, please call the office to schedule an appointment.  4. Please schedule an appointment with your Dr. Herbert Electrophysiologist in 1-2 weeks for AICD wound check, please call the office to schedule an appointment at [phone]. 1. Please schedule an appointment with your Interventional Cardiologist Dr. Horton in 1-2weeks, please call the office to schedule an appointment at [phone].  2. Please schedule an appointment with your PCP in 1-2 weeks, call the office to schedule an appointment.   3. Please schedule an appointment with your Cardiologist Dr. Jose Wynn in 2 weeks, please call the office to schedule an appointment.  4. Please schedule an appointment with your Dr. Herbert Electrophysiologist in 1-2 weeks for AICD device wound check, please call the office to schedule an appointment at [phone]. 1. Please schedule an appointment with your Interventional Cardiologist Dr. Horton in 1-2weeks, please call the office to schedule an appointment at [phone].  2. Please schedule an appointment with your PCP in 1-2 weeks, call the office to schedule an appointment.   3. Please schedule an appointment with your Cardiologist Dr. Jose Wynn in 2 weeks, please call the office to schedule an appointment.  4. Please schedule an appointment with your Dr. Herbert Electrophysiologist in 1-2 weeks for AICD device wound check, please call the office if you have to reschedule an appointment at [phone]. Your appointment is at 6/2/17 950am   5.  You have an appointment with  Dr Arredondo  6/5/17 10 am please call the office if you have to reschedule an appointment at [phone].

## 2017-05-22 NOTE — DISCHARGE NOTE ADULT - CARE PROVIDER_API CALL
Sol Ramírez), Cardiology; Internal Medicine; Interventional Cardiology  [address] [place] [address]  Phone: [phone]  Fax: [phone]    Yuridia Arredondo), Surgery; Thoracic and Cardiac Surgery  [address] [place] [address]  Phone: [phone]  Fax: [phone] Sol Ramírez), Cardiology; Internal Medicine; Interventional Cardiology  [address] [place] [address]  Phone: [phone]  Fax: [phone]    Yuridia Arredondo (MD), Surgery; Thoracic and Cardiac Surgery  [address] [place] [address]  Phone: [phone]  Fax: [phone]    Jose Wynn), Cardiovascular Disease; Nuclear Cardiology  [address] [place] [address]  Phone: [phone]  Fax: [phone]    Ning Herbert), Cardiac Electrophysiology; Cardiovascular Disease  [address] [place] [address]  Phone: [phone]  Fax: [phone]

## 2017-05-23 LAB
ANION GAP SERPL CALC-SCNC: 16 MMOL/L — SIGNIFICANT CHANGE UP (ref 5–17)
APTT BLD: 30.8 SEC — SIGNIFICANT CHANGE UP (ref 27.5–37.4)
APTT BLD: 51.5 SEC — HIGH (ref 27.5–37.4)
BUN SERPL-MCNC: 19 MG/DL — SIGNIFICANT CHANGE UP (ref 7–23)
CALCIUM SERPL-MCNC: 8.1 MG/DL — LOW (ref 8.4–10.5)
CHLORIDE SERPL-SCNC: 96 MMOL/L — SIGNIFICANT CHANGE UP (ref 96–108)
CO2 SERPL-SCNC: 23 MMOL/L — SIGNIFICANT CHANGE UP (ref 22–31)
CREAT SERPL-MCNC: 0.91 MG/DL — SIGNIFICANT CHANGE UP (ref 0.5–1.3)
GLUCOSE SERPL-MCNC: 113 MG/DL — HIGH (ref 70–99)
HCT VFR BLD CALC: 31.7 % — LOW (ref 34.5–45)
HGB BLD-MCNC: 10.6 G/DL — LOW (ref 11.5–15.5)
INR BLD: 1.33 RATIO — HIGH (ref 0.88–1.16)
MCHC RBC-ENTMCNC: 28.8 PG — SIGNIFICANT CHANGE UP (ref 27–34)
MCHC RBC-ENTMCNC: 33.4 GM/DL — SIGNIFICANT CHANGE UP (ref 32–36)
MCV RBC AUTO: 86.2 FL — SIGNIFICANT CHANGE UP (ref 80–100)
PLATELET # BLD AUTO: 166 K/UL — SIGNIFICANT CHANGE UP (ref 150–400)
POTASSIUM SERPL-MCNC: 3.8 MMOL/L — SIGNIFICANT CHANGE UP (ref 3.5–5.3)
POTASSIUM SERPL-SCNC: 3.8 MMOL/L — SIGNIFICANT CHANGE UP (ref 3.5–5.3)
PROTHROM AB SERPL-ACNC: 14.6 SEC — HIGH (ref 9.8–12.7)
RBC # BLD: 3.67 M/UL — LOW (ref 3.8–5.2)
RBC # FLD: 15.4 % — HIGH (ref 10.3–14.5)
SODIUM SERPL-SCNC: 135 MMOL/L — SIGNIFICANT CHANGE UP (ref 135–145)
WBC # BLD: 7.5 K/UL — SIGNIFICANT CHANGE UP (ref 3.8–10.5)
WBC # FLD AUTO: 7.5 K/UL — SIGNIFICANT CHANGE UP (ref 3.8–10.5)

## 2017-05-23 PROCEDURE — 33249 INSJ/RPLCMT DEFIB W/LEAD(S): CPT | Mod: 58,59

## 2017-05-23 PROCEDURE — 71010: CPT | Mod: 26

## 2017-05-23 PROCEDURE — 99152 MOD SED SAME PHYS/QHP 5/>YRS: CPT

## 2017-05-23 PROCEDURE — 93010 ELECTROCARDIOGRAM REPORT: CPT

## 2017-05-23 RX ORDER — CEFAZOLIN SODIUM 1 G
1000 VIAL (EA) INJECTION EVERY 8 HOURS
Qty: 0 | Refills: 0 | Status: COMPLETED | OUTPATIENT
Start: 2017-05-23 | End: 2017-05-24

## 2017-05-23 RX ORDER — RIVAROXABAN 15 MG-20MG
15 KIT ORAL DAILY
Qty: 0 | Refills: 0 | Status: DISCONTINUED | OUTPATIENT
Start: 2017-05-24 | End: 2017-05-24

## 2017-05-23 RX ADMIN — Medication 50 MILLIGRAM(S): at 12:42

## 2017-05-23 RX ADMIN — Medication 50 MILLIGRAM(S): at 05:42

## 2017-05-23 RX ADMIN — Medication 50 MILLIGRAM(S): at 05:39

## 2017-05-23 RX ADMIN — Medication 50 MILLIGRAM(S): at 18:32

## 2017-05-23 RX ADMIN — PANTOPRAZOLE SODIUM 40 MILLIGRAM(S): 20 TABLET, DELAYED RELEASE ORAL at 06:29

## 2017-05-23 RX ADMIN — Medication 20 MILLIGRAM(S): at 05:39

## 2017-05-23 RX ADMIN — SODIUM CHLORIDE 3 MILLILITER(S): 9 INJECTION INTRAMUSCULAR; INTRAVENOUS; SUBCUTANEOUS at 22:12

## 2017-05-23 RX ADMIN — Medication 50 MILLIGRAM(S): at 23:34

## 2017-05-23 RX ADMIN — Medication 100 MILLIGRAM(S): at 22:10

## 2017-05-23 RX ADMIN — SODIUM CHLORIDE 3 MILLILITER(S): 9 INJECTION INTRAMUSCULAR; INTRAVENOUS; SUBCUTANEOUS at 13:07

## 2017-05-23 RX ADMIN — LISINOPRIL 10 MILLIGRAM(S): 2.5 TABLET ORAL at 05:39

## 2017-05-23 RX ADMIN — Medication 100 MILLIGRAM(S): at 05:39

## 2017-05-23 RX ADMIN — Medication 100 MILLIGRAM(S): at 23:01

## 2017-05-23 RX ADMIN — SODIUM CHLORIDE 3 MILLILITER(S): 9 INJECTION INTRAMUSCULAR; INTRAVENOUS; SUBCUTANEOUS at 05:40

## 2017-05-23 RX ADMIN — Medication 81 MILLIGRAM(S): at 12:41

## 2017-05-23 RX ADMIN — HEPARIN SODIUM 1100 UNIT(S)/HR: 5000 INJECTION INTRAVENOUS; SUBCUTANEOUS at 07:55

## 2017-05-23 NOTE — DIETITIAN INITIAL EVALUATION ADULT. - OTHER INFO
Pt seen for LOS. Pt reports fair appetite at this time, says during this hospital stay and after surgery 5/16/17 she's been only able to eat ~50% of meal. Pt denies recent wt changes, however admits to hx of significant wt loss ~25 pounds (170 was UBW about 1 year ago) and lost the weight 2/2 breast cancer and chemo treatments which made her feel very ill and worsened her appetite. Per pt cancer now in remission and her appetite has generally improved. Pt denies need for oral supplement, state she is able to eat fairly well. Diet preferences obtained. Pt denies N/V/diarrhea, admits to constipation x 2 days c last BM documented 5/21. Denies difficulty chewing/swallowing, denies food allergies. Pt dosing off during this encounter therefore diet education not appropriate at this time.

## 2017-05-23 NOTE — DIETITIAN INITIAL EVALUATION ADULT. - NS AS NUTRI INTERV MEALS SNACK
[place] - modified diet/1) When po diet resumes recommend low sodium diet (liberalized to promote optimal po intake) 2) Pt denies need for oral supplements at this time

## 2017-05-23 NOTE — DIETITIAN INITIAL EVALUATION ADULT. - NS AS NUTRI INTERV ED CONTENT
1) Pt lethargic upon this encounter, dosing off, therefore diet education deferred. Upon diet recall pt did mention she cooks with less salt, may benefit from review of heart-healthy recommendations once more receptive. 2) Pt aware that RD remains available as needed

## 2017-05-23 NOTE — DIETITIAN INITIAL EVALUATION ADULT. - ADHERENCE
Pt states she watches her sodium intake and  generally cooks healthful meals but does not monitor saturated fat intake closely./fair

## 2017-05-23 NOTE — DIETITIAN INITIAL EVALUATION ADULT. - ENERGY NEEDS
ht: 64 inches. wt: 147.7 pounds (current, standing, +2 edema B/L feet, B/L ankles). BMI: 25.3 kG/m2. UBW: 155 pounds. IBW: 120 pounds +/- 10%. %IBW: 123%  Other pertinent objective information: 84 year old female pt c PMH breast ca (dx'ed in July 2016, s/p resection, was on chemo, now in remission as per patient), HTN, HLD, PAF(on Eliquis last dose 1.5 months ago, it was stopped r/t blood in urine- no further bleeding noted since Eliquis was stopped), seen by Dr. Whitaker (CT surg) dx with new mitral regurgitation. s/p Mitral valvuloplasty c mitraclip 5/16/17. Pt currently NPO for AICD placement planned today. No pressure injuries.

## 2017-05-23 NOTE — DIETITIAN INITIAL EVALUATION ADULT. - ORAL INTAKE PTA
good/Pt reports good appetite/intake while at home, states she prepares meals mostly by herself. Typical intake would consist of hot cereal/oatmeal for breakfast, and a hot meal for lunch and dinner typically consisting of lamb or chicken, starch, and vegetable. Pt states she takes vitamin supplements at home however is unable to recall which  ones.

## 2017-05-24 LAB
ANION GAP SERPL CALC-SCNC: 11 MMOL/L — SIGNIFICANT CHANGE UP (ref 5–17)
BUN SERPL-MCNC: 20 MG/DL — SIGNIFICANT CHANGE UP (ref 7–23)
CALCIUM SERPL-MCNC: 8.4 MG/DL — SIGNIFICANT CHANGE UP (ref 8.4–10.5)
CHLORIDE SERPL-SCNC: 97 MMOL/L — SIGNIFICANT CHANGE UP (ref 96–108)
CO2 SERPL-SCNC: 25 MMOL/L — SIGNIFICANT CHANGE UP (ref 22–31)
CREAT SERPL-MCNC: 0.98 MG/DL — SIGNIFICANT CHANGE UP (ref 0.5–1.3)
GLUCOSE SERPL-MCNC: 112 MG/DL — HIGH (ref 70–99)
HCT VFR BLD CALC: 30.3 % — LOW (ref 34.5–45)
HGB BLD-MCNC: 10.4 G/DL — LOW (ref 11.5–15.5)
INR BLD: 1.37 RATIO — HIGH (ref 0.88–1.16)
MCHC RBC-ENTMCNC: 29.4 PG — SIGNIFICANT CHANGE UP (ref 27–34)
MCHC RBC-ENTMCNC: 34.3 GM/DL — SIGNIFICANT CHANGE UP (ref 32–36)
MCV RBC AUTO: 85.7 FL — SIGNIFICANT CHANGE UP (ref 80–100)
PLATELET # BLD AUTO: 142 K/UL — LOW (ref 150–400)
POTASSIUM SERPL-MCNC: 3.3 MMOL/L — LOW (ref 3.5–5.3)
POTASSIUM SERPL-MCNC: 4.4 MMOL/L — SIGNIFICANT CHANGE UP (ref 3.5–5.3)
POTASSIUM SERPL-SCNC: 3.3 MMOL/L — LOW (ref 3.5–5.3)
POTASSIUM SERPL-SCNC: 4.4 MMOL/L — SIGNIFICANT CHANGE UP (ref 3.5–5.3)
PROTHROM AB SERPL-ACNC: 14.9 SEC — HIGH (ref 9.8–12.7)
RBC # BLD: 3.54 M/UL — LOW (ref 3.8–5.2)
RBC # FLD: 15.2 % — HIGH (ref 10.3–14.5)
SODIUM SERPL-SCNC: 133 MMOL/L — LOW (ref 135–145)
WBC # BLD: 9.1 K/UL — SIGNIFICANT CHANGE UP (ref 3.8–10.5)
WBC # FLD AUTO: 9.1 K/UL — SIGNIFICANT CHANGE UP (ref 3.8–10.5)

## 2017-05-24 RX ORDER — POTASSIUM BICARBONATE 978 MG/1
25 TABLET, EFFERVESCENT ORAL
Qty: 0 | Refills: 0 | Status: COMPLETED | OUTPATIENT
Start: 2017-05-24 | End: 2017-05-24

## 2017-05-24 RX ORDER — METOPROLOL TARTRATE 50 MG
25 TABLET ORAL ONCE
Qty: 0 | Refills: 0 | Status: COMPLETED | OUTPATIENT
Start: 2017-05-24 | End: 2017-05-24

## 2017-05-24 RX ORDER — METOPROLOL TARTRATE 50 MG
75 TABLET ORAL
Qty: 0 | Refills: 0 | Status: DISCONTINUED | OUTPATIENT
Start: 2017-05-24 | End: 2017-05-24

## 2017-05-24 RX ORDER — METOPROLOL TARTRATE 50 MG
75 TABLET ORAL
Qty: 0 | Refills: 0 | Status: COMPLETED | OUTPATIENT
Start: 2017-05-24 | End: 2017-05-24

## 2017-05-24 RX ORDER — IBUPROFEN 200 MG
400 TABLET ORAL ONCE
Qty: 0 | Refills: 0 | Status: COMPLETED | OUTPATIENT
Start: 2017-05-24 | End: 2017-05-24

## 2017-05-24 RX ORDER — METOPROLOL TARTRATE 50 MG
100 TABLET ORAL DAILY
Qty: 0 | Refills: 0 | Status: DISCONTINUED | OUTPATIENT
Start: 2017-05-25 | End: 2017-05-25

## 2017-05-24 RX ORDER — RIVAROXABAN 15 MG-20MG
15 KIT ORAL DAILY
Qty: 0 | Refills: 0 | Status: DISCONTINUED | OUTPATIENT
Start: 2017-05-25 | End: 2017-05-25

## 2017-05-24 RX ADMIN — Medication 20 MILLIGRAM(S): at 05:44

## 2017-05-24 RX ADMIN — RIVAROXABAN 15 MILLIGRAM(S): KIT at 11:08

## 2017-05-24 RX ADMIN — Medication 100 MILLIGRAM(S): at 09:05

## 2017-05-24 RX ADMIN — POTASSIUM BICARBONATE 25 MILLIEQUIVALENT(S): 978 TABLET, EFFERVESCENT ORAL at 12:58

## 2017-05-24 RX ADMIN — SODIUM CHLORIDE 3 MILLILITER(S): 9 INJECTION INTRAMUSCULAR; INTRAVENOUS; SUBCUTANEOUS at 11:03

## 2017-05-24 RX ADMIN — SODIUM CHLORIDE 3 MILLILITER(S): 9 INJECTION INTRAMUSCULAR; INTRAVENOUS; SUBCUTANEOUS at 05:45

## 2017-05-24 RX ADMIN — LISINOPRIL 10 MILLIGRAM(S): 2.5 TABLET ORAL at 05:44

## 2017-05-24 RX ADMIN — Medication 25 MILLIGRAM(S): at 12:58

## 2017-05-24 RX ADMIN — Medication 50 MILLIGRAM(S): at 11:08

## 2017-05-24 RX ADMIN — Medication 81 MILLIGRAM(S): at 11:08

## 2017-05-24 RX ADMIN — Medication 50 MILLIGRAM(S): at 17:31

## 2017-05-24 RX ADMIN — Medication 100 MILLIGRAM(S): at 17:30

## 2017-05-24 RX ADMIN — SODIUM CHLORIDE 3 MILLILITER(S): 9 INJECTION INTRAMUSCULAR; INTRAVENOUS; SUBCUTANEOUS at 22:04

## 2017-05-24 RX ADMIN — Medication 400 MILLIGRAM(S): at 14:46

## 2017-05-24 RX ADMIN — Medication 100 MILLIGRAM(S): at 05:44

## 2017-05-24 RX ADMIN — Medication 100 MILLIGRAM(S): at 11:08

## 2017-05-24 RX ADMIN — PANTOPRAZOLE SODIUM 40 MILLIGRAM(S): 20 TABLET, DELAYED RELEASE ORAL at 06:39

## 2017-05-24 RX ADMIN — Medication 50 MILLIGRAM(S): at 05:45

## 2017-05-24 RX ADMIN — Medication 400 MILLIGRAM(S): at 15:16

## 2017-05-24 RX ADMIN — POTASSIUM BICARBONATE 25 MILLIEQUIVALENT(S): 978 TABLET, EFFERVESCENT ORAL at 14:46

## 2017-05-24 RX ADMIN — POTASSIUM BICARBONATE 25 MILLIEQUIVALENT(S): 978 TABLET, EFFERVESCENT ORAL at 16:29

## 2017-05-24 RX ADMIN — Medication 50 MILLIGRAM(S): at 05:44

## 2017-05-25 VITALS
DIASTOLIC BLOOD PRESSURE: 73 MMHG | HEART RATE: 69 BPM | RESPIRATION RATE: 18 BRPM | SYSTOLIC BLOOD PRESSURE: 156 MMHG | TEMPERATURE: 98 F | OXYGEN SATURATION: 96 %

## 2017-05-25 LAB
ANION GAP SERPL CALC-SCNC: 11 MMOL/L — SIGNIFICANT CHANGE UP (ref 5–17)
BUN SERPL-MCNC: 26 MG/DL — HIGH (ref 7–23)
CALCIUM SERPL-MCNC: 8.2 MG/DL — LOW (ref 8.4–10.5)
CHLORIDE SERPL-SCNC: 97 MMOL/L — SIGNIFICANT CHANGE UP (ref 96–108)
CO2 SERPL-SCNC: 29 MMOL/L — SIGNIFICANT CHANGE UP (ref 22–31)
CREAT SERPL-MCNC: 1.03 MG/DL — SIGNIFICANT CHANGE UP (ref 0.5–1.3)
GLUCOSE SERPL-MCNC: 96 MG/DL — SIGNIFICANT CHANGE UP (ref 70–99)
HCT VFR BLD CALC: 32.3 % — LOW (ref 34.5–45)
HGB BLD-MCNC: 10.7 G/DL — LOW (ref 11.5–15.5)
MCHC RBC-ENTMCNC: 28.6 PG — SIGNIFICANT CHANGE UP (ref 27–34)
MCHC RBC-ENTMCNC: 33.1 GM/DL — SIGNIFICANT CHANGE UP (ref 32–36)
MCV RBC AUTO: 86.5 FL — SIGNIFICANT CHANGE UP (ref 80–100)
PLATELET # BLD AUTO: 143 K/UL — LOW (ref 150–400)
POTASSIUM SERPL-MCNC: 3.8 MMOL/L — SIGNIFICANT CHANGE UP (ref 3.5–5.3)
POTASSIUM SERPL-SCNC: 3.8 MMOL/L — SIGNIFICANT CHANGE UP (ref 3.5–5.3)
RBC # BLD: 3.73 M/UL — LOW (ref 3.8–5.2)
RBC # FLD: 15.4 % — HIGH (ref 10.3–14.5)
SODIUM SERPL-SCNC: 137 MMOL/L — SIGNIFICANT CHANGE UP (ref 135–145)
WBC # BLD: 7.9 K/UL — SIGNIFICANT CHANGE UP (ref 3.8–10.5)
WBC # FLD AUTO: 7.9 K/UL — SIGNIFICANT CHANGE UP (ref 3.8–10.5)

## 2017-05-25 PROCEDURE — 82435 ASSAY OF BLOOD CHLORIDE: CPT

## 2017-05-25 PROCEDURE — 99152 MOD SED SAME PHYS/QHP 5/>YRS: CPT

## 2017-05-25 PROCEDURE — 33249 INSJ/RPLCMT DEFIB W/LEAD(S): CPT | Mod: 58

## 2017-05-25 PROCEDURE — C1889: CPT

## 2017-05-25 PROCEDURE — 86900 BLOOD TYPING SEROLOGIC ABO: CPT

## 2017-05-25 PROCEDURE — C1887: CPT

## 2017-05-25 PROCEDURE — 71045 X-RAY EXAM CHEST 1 VIEW: CPT

## 2017-05-25 PROCEDURE — 93620 COMP EP EVL R AT VEN PAC&REC: CPT

## 2017-05-25 PROCEDURE — 86901 BLOOD TYPING SEROLOGIC RH(D): CPT

## 2017-05-25 PROCEDURE — 85014 HEMATOCRIT: CPT

## 2017-05-25 PROCEDURE — C1730: CPT

## 2017-05-25 PROCEDURE — 82803 BLOOD GASES ANY COMBINATION: CPT

## 2017-05-25 PROCEDURE — 86850 RBC ANTIBODY SCREEN: CPT

## 2017-05-25 PROCEDURE — 84132 ASSAY OF SERUM POTASSIUM: CPT

## 2017-05-25 PROCEDURE — 80053 COMPREHEN METABOLIC PANEL: CPT

## 2017-05-25 PROCEDURE — 97530 THERAPEUTIC ACTIVITIES: CPT

## 2017-05-25 PROCEDURE — 85610 PROTHROMBIN TIME: CPT

## 2017-05-25 PROCEDURE — 80048 BASIC METABOLIC PNL TOTAL CA: CPT

## 2017-05-25 PROCEDURE — 83605 ASSAY OF LACTIC ACID: CPT

## 2017-05-25 PROCEDURE — 82947 ASSAY GLUCOSE BLOOD QUANT: CPT

## 2017-05-25 PROCEDURE — 84295 ASSAY OF SERUM SODIUM: CPT

## 2017-05-25 PROCEDURE — C1769: CPT

## 2017-05-25 PROCEDURE — C1894: CPT

## 2017-05-25 PROCEDURE — C1892: CPT

## 2017-05-25 PROCEDURE — 85730 THROMBOPLASTIN TIME PARTIAL: CPT

## 2017-05-25 PROCEDURE — 97116 GAIT TRAINING THERAPY: CPT

## 2017-05-25 PROCEDURE — 33418 REPAIR TCAT MITRAL VALVE: CPT | Mod: Q0

## 2017-05-25 PROCEDURE — C1777: CPT

## 2017-05-25 PROCEDURE — C8929: CPT

## 2017-05-25 PROCEDURE — C1722: CPT

## 2017-05-25 PROCEDURE — 93005 ELECTROCARDIOGRAM TRACING: CPT

## 2017-05-25 PROCEDURE — 99153 MOD SED SAME PHYS/QHP EA: CPT

## 2017-05-25 PROCEDURE — 85027 COMPLETE CBC AUTOMATED: CPT

## 2017-05-25 PROCEDURE — 82330 ASSAY OF CALCIUM: CPT

## 2017-05-25 PROCEDURE — 93355 ECHO TRANSESOPHAGEAL (TEE): CPT

## 2017-05-25 PROCEDURE — 97161 PT EVAL LOW COMPLEX 20 MIN: CPT

## 2017-05-25 RX ORDER — PANTOPRAZOLE SODIUM 20 MG/1
1 TABLET, DELAYED RELEASE ORAL
Qty: 0 | Refills: 0 | COMMUNITY
Start: 2017-05-25

## 2017-05-25 RX ORDER — RIVAROXABAN 15 MG-20MG
1 KIT ORAL
Qty: 0 | Refills: 0 | COMMUNITY
Start: 2017-05-25

## 2017-05-25 RX ORDER — OXYCODONE HYDROCHLORIDE 5 MG/1
1 TABLET ORAL
Qty: 0 | Refills: 0 | COMMUNITY
Start: 2017-05-25

## 2017-05-25 RX ORDER — FUROSEMIDE 40 MG
1 TABLET ORAL
Qty: 0 | Refills: 0 | COMMUNITY
Start: 2017-05-25

## 2017-05-25 RX ORDER — LISINOPRIL 2.5 MG/1
1 TABLET ORAL
Qty: 0 | Refills: 0 | COMMUNITY
Start: 2017-05-25

## 2017-05-25 RX ORDER — POTASSIUM CHLORIDE 20 MEQ
1 PACKET (EA) ORAL
Qty: 0 | Refills: 0 | COMMUNITY
Start: 2017-05-25

## 2017-05-25 RX ORDER — HYDRALAZINE HCL 50 MG
1 TABLET ORAL
Qty: 0 | Refills: 0 | COMMUNITY
Start: 2017-05-25

## 2017-05-25 RX ORDER — DIPHENHYDRAMINE HCL 50 MG
1 CAPSULE ORAL
Qty: 0 | Refills: 0 | COMMUNITY
Start: 2017-05-25

## 2017-05-25 RX ORDER — DOCUSATE SODIUM 100 MG
1 CAPSULE ORAL
Qty: 0 | Refills: 0 | COMMUNITY
Start: 2017-05-25

## 2017-05-25 RX ORDER — METOPROLOL TARTRATE 50 MG
1 TABLET ORAL
Qty: 0 | Refills: 0 | COMMUNITY
Start: 2017-05-25

## 2017-05-25 RX ADMIN — Medication 50 MILLIGRAM(S): at 00:11

## 2017-05-25 RX ADMIN — Medication 50 MILLIGRAM(S): at 17:19

## 2017-05-25 RX ADMIN — Medication 100 MILLIGRAM(S): at 11:42

## 2017-05-25 RX ADMIN — LISINOPRIL 10 MILLIGRAM(S): 2.5 TABLET ORAL at 06:22

## 2017-05-25 RX ADMIN — SODIUM CHLORIDE 3 MILLILITER(S): 9 INJECTION INTRAMUSCULAR; INTRAVENOUS; SUBCUTANEOUS at 11:40

## 2017-05-25 RX ADMIN — Medication 100 MILLIGRAM(S): at 06:22

## 2017-05-25 RX ADMIN — Medication 50 MILLIGRAM(S): at 11:42

## 2017-05-25 RX ADMIN — RIVAROXABAN 15 MILLIGRAM(S): KIT at 11:42

## 2017-05-25 RX ADMIN — Medication 50 MILLIGRAM(S): at 06:22

## 2017-05-25 RX ADMIN — SODIUM CHLORIDE 3 MILLILITER(S): 9 INJECTION INTRAMUSCULAR; INTRAVENOUS; SUBCUTANEOUS at 06:02

## 2017-05-25 RX ADMIN — PANTOPRAZOLE SODIUM 40 MILLIGRAM(S): 20 TABLET, DELAYED RELEASE ORAL at 06:22

## 2017-05-25 RX ADMIN — Medication 20 MILLIGRAM(S): at 06:22

## 2017-05-25 RX ADMIN — Medication 81 MILLIGRAM(S): at 11:42

## 2017-05-31 PROBLEM — Z98.890 H/O MITRAL VALVE REPAIR: Status: ACTIVE | Noted: 2017-05-31

## 2017-05-31 RX ORDER — FUROSEMIDE 20 MG/1
20 TABLET ORAL TWICE DAILY
Refills: 0 | Status: DISCONTINUED | COMMUNITY
End: 2017-05-31

## 2017-05-31 RX ORDER — PANTOPRAZOLE 40 MG/1
40 TABLET, DELAYED RELEASE ORAL DAILY
Refills: 0 | Status: ACTIVE | COMMUNITY

## 2017-05-31 RX ORDER — DOCUSATE SODIUM 100 MG/1
100 CAPSULE ORAL
Refills: 0 | Status: ACTIVE | COMMUNITY

## 2017-05-31 RX ORDER — METOPROLOL SUCCINATE 50 MG/1
50 TABLET, EXTENDED RELEASE ORAL DAILY
Refills: 0 | Status: ACTIVE | COMMUNITY

## 2017-05-31 RX ORDER — POTASSIUM CHLORIDE 750 MG/1
10 TABLET, FILM COATED, EXTENDED RELEASE ORAL DAILY
Refills: 0 | Status: ACTIVE | COMMUNITY

## 2017-05-31 RX ORDER — DILTIAZEM HYDROCHLORIDE 240 MG/1
240 CAPSULE, EXTENDED RELEASE ORAL
Qty: 30 | Refills: 0 | Status: DISCONTINUED | COMMUNITY
Start: 2016-10-14

## 2017-05-31 RX ORDER — RIVAROXABAN 15 MG/1
15 TABLET, FILM COATED ORAL
Refills: 0 | Status: ACTIVE | COMMUNITY

## 2017-05-31 RX ORDER — ANASTROZOLE TABLETS 1 MG/1
1 TABLET ORAL DAILY
Refills: 0 | Status: DISCONTINUED | COMMUNITY
End: 2017-05-31

## 2017-05-31 RX ORDER — PRAVASTATIN SODIUM 10 MG/1
10 TABLET ORAL
Qty: 90 | Refills: 0 | Status: DISCONTINUED | COMMUNITY
Start: 2017-01-24

## 2017-05-31 RX ORDER — LISINOPRIL 10 MG/1
10 TABLET ORAL DAILY
Refills: 0 | Status: ACTIVE | COMMUNITY

## 2017-05-31 RX ORDER — POTASSIUM CHLORIDE 750 MG/1
10 TABLET, FILM COATED, EXTENDED RELEASE ORAL
Refills: 0 | Status: DISCONTINUED | COMMUNITY
End: 2017-05-31

## 2017-05-31 RX ORDER — HYDRALAZINE HYDROCHLORIDE 25 MG/1
25 TABLET ORAL
Qty: 90 | Refills: 0 | Status: DISCONTINUED | COMMUNITY
Start: 2017-04-25

## 2017-05-31 RX ORDER — OXYCODONE HYDROCHLORIDE AND ACETAMINOPHEN 5; 325 MG/1; MG/1
5-325 TABLET ORAL
Qty: 20 | Refills: 0 | Status: ACTIVE | COMMUNITY

## 2017-05-31 RX ORDER — APIXABAN 2.5 MG/1
2.5 TABLET, FILM COATED ORAL TWICE DAILY
Refills: 0 | Status: DISCONTINUED | COMMUNITY
End: 2017-05-31

## 2017-05-31 RX ORDER — METOPROLOL SUCCINATE 100 MG/1
100 TABLET, EXTENDED RELEASE ORAL DAILY
Refills: 0 | Status: DISCONTINUED | COMMUNITY
Start: 2017-04-07 | End: 2017-05-31

## 2017-05-31 RX ORDER — FUROSEMIDE 20 MG/1
20 TABLET ORAL DAILY
Refills: 0 | Status: ACTIVE | COMMUNITY

## 2017-05-31 RX ORDER — HYDRALAZINE HYDROCHLORIDE 50 MG/1
50 TABLET ORAL EVERY 6 HOURS
Refills: 0 | Status: ACTIVE | COMMUNITY

## 2017-06-02 ENCOUNTER — NON-APPOINTMENT (OUTPATIENT)
Age: 82
End: 2017-06-02

## 2017-06-02 ENCOUNTER — APPOINTMENT (OUTPATIENT)
Dept: ELECTROPHYSIOLOGY | Facility: CLINIC | Age: 82
End: 2017-06-02

## 2017-06-02 ENCOUNTER — APPOINTMENT (OUTPATIENT)
Dept: CARDIOTHORACIC SURGERY | Facility: CLINIC | Age: 82
End: 2017-06-02

## 2017-06-02 VITALS
HEIGHT: 64 IN | SYSTOLIC BLOOD PRESSURE: 137 MMHG | DIASTOLIC BLOOD PRESSURE: 75 MMHG | BODY MASS INDEX: 24.59 KG/M2 | HEART RATE: 67 BPM | OXYGEN SATURATION: 99 % | WEIGHT: 144 LBS

## 2017-06-02 DIAGNOSIS — I50.22 CHRONIC SYSTOLIC (CONGESTIVE) HEART FAILURE: ICD-10-CM

## 2017-06-05 ENCOUNTER — APPOINTMENT (OUTPATIENT)
Dept: CARDIOTHORACIC SURGERY | Facility: CLINIC | Age: 82
End: 2017-06-05

## 2017-06-05 DIAGNOSIS — Z98.890 OTHER SPECIFIED POSTPROCEDURAL STATES: ICD-10-CM

## 2017-06-20 DIAGNOSIS — I34.9 NONRHEUMATIC MITRAL VALVE DISORDER, UNSPECIFIED: ICD-10-CM

## 2017-06-20 DIAGNOSIS — Z01.818 ENCOUNTER FOR OTHER PREPROCEDURAL EXAMINATION: ICD-10-CM

## 2017-06-20 PROCEDURE — G0463: CPT

## 2017-06-20 PROCEDURE — 86923 COMPATIBILITY TEST ELECTRIC: CPT

## 2017-07-27 ENCOUNTER — INPATIENT (INPATIENT)
Facility: HOSPITAL | Age: 82
LOS: 3 days | Discharge: HOME HEALTH | DRG: 291 | End: 2017-07-31
Attending: INTERNAL MEDICINE | Admitting: INTERNAL MEDICINE
Payer: COMMERCIAL

## 2017-07-27 VITALS
SYSTOLIC BLOOD PRESSURE: 164 MMHG | OXYGEN SATURATION: 98 % | HEART RATE: 111 BPM | DIASTOLIC BLOOD PRESSURE: 119 MMHG | RESPIRATION RATE: 28 BRPM | TEMPERATURE: 98 F

## 2017-07-27 DIAGNOSIS — E11.9 TYPE 2 DIABETES MELLITUS WITHOUT COMPLICATIONS: ICD-10-CM

## 2017-07-27 DIAGNOSIS — Z98.890 OTHER SPECIFIED POSTPROCEDURAL STATES: Chronic | ICD-10-CM

## 2017-07-27 DIAGNOSIS — I50.9 HEART FAILURE, UNSPECIFIED: ICD-10-CM

## 2017-07-27 DIAGNOSIS — Z98.49 CATARACT EXTRACTION STATUS, UNSPECIFIED EYE: Chronic | ICD-10-CM

## 2017-07-27 DIAGNOSIS — I48.0 PAROXYSMAL ATRIAL FIBRILLATION: ICD-10-CM

## 2017-07-27 DIAGNOSIS — I10 ESSENTIAL (PRIMARY) HYPERTENSION: ICD-10-CM

## 2017-07-27 LAB
ALBUMIN SERPL ELPH-MCNC: 3.8 G/DL — SIGNIFICANT CHANGE UP (ref 3.3–5)
ALP SERPL-CCNC: 119 U/L — SIGNIFICANT CHANGE UP (ref 40–120)
ALT FLD-CCNC: 46 U/L RC — HIGH (ref 10–45)
ANION GAP SERPL CALC-SCNC: 14 MMOL/L — SIGNIFICANT CHANGE UP (ref 5–17)
ANISOCYTOSIS BLD QL: SLIGHT — SIGNIFICANT CHANGE UP
APPEARANCE UR: CLEAR — SIGNIFICANT CHANGE UP
APTT BLD: 35.5 SEC — SIGNIFICANT CHANGE UP (ref 27.5–37.4)
AST SERPL-CCNC: 54 U/L — HIGH (ref 10–40)
BASE EXCESS BLDV CALC-SCNC: -0.2 MMOL/L — SIGNIFICANT CHANGE UP (ref -2–2)
BASOPHILS # BLD AUTO: 0 K/UL — SIGNIFICANT CHANGE UP (ref 0–0.2)
BASOPHILS NFR BLD AUTO: 0 % — SIGNIFICANT CHANGE UP (ref 0–2)
BILIRUB SERPL-MCNC: 1.4 MG/DL — HIGH (ref 0.2–1.2)
BILIRUB UR-MCNC: NEGATIVE — SIGNIFICANT CHANGE UP
BUN SERPL-MCNC: 28 MG/DL — HIGH (ref 7–23)
CA-I SERPL-SCNC: 1.15 MMOL/L — SIGNIFICANT CHANGE UP (ref 1.12–1.3)
CALCIUM SERPL-MCNC: 9.2 MG/DL — SIGNIFICANT CHANGE UP (ref 8.4–10.5)
CHLORIDE BLDV-SCNC: 111 MMOL/L — HIGH (ref 96–108)
CHLORIDE SERPL-SCNC: 106 MMOL/L — SIGNIFICANT CHANGE UP (ref 96–108)
CK MB BLD-MCNC: 13.6 % — HIGH (ref 0–3.5)
CK MB CFR SERPL CALC: 33.6 NG/ML — HIGH (ref 0–3.8)
CK SERPL-CCNC: 247 U/L — HIGH (ref 25–170)
CO2 BLDV-SCNC: 24 MMOL/L — SIGNIFICANT CHANGE UP (ref 22–30)
CO2 SERPL-SCNC: 21 MMOL/L — LOW (ref 22–31)
COLOR SPEC: YELLOW — SIGNIFICANT CHANGE UP
CREAT SERPL-MCNC: 1.33 MG/DL — HIGH (ref 0.5–1.3)
DACRYOCYTES BLD QL SMEAR: SLIGHT — SIGNIFICANT CHANGE UP
DIFF PNL FLD: NEGATIVE — SIGNIFICANT CHANGE UP
EOSINOPHIL # BLD AUTO: 0 K/UL — SIGNIFICANT CHANGE UP (ref 0–0.5)
EOSINOPHIL NFR BLD AUTO: 0.4 % — SIGNIFICANT CHANGE UP (ref 0–6)
EPI CELLS # UR: SIGNIFICANT CHANGE UP /HPF
GAS PNL BLDV: 139 MMOL/L — SIGNIFICANT CHANGE UP (ref 136–145)
GAS PNL BLDV: SIGNIFICANT CHANGE UP
GLUCOSE BLDV-MCNC: 141 MG/DL — HIGH (ref 70–99)
GLUCOSE SERPL-MCNC: 141 MG/DL — HIGH (ref 70–99)
GLUCOSE UR QL: NEGATIVE — SIGNIFICANT CHANGE UP
HCO3 BLDV-SCNC: 23 MMOL/L — SIGNIFICANT CHANGE UP (ref 21–29)
HCT VFR BLD CALC: 38.9 % — SIGNIFICANT CHANGE UP (ref 34.5–45)
HCT VFR BLDA CALC: 38 % — LOW (ref 39–50)
HGB BLD CALC-MCNC: 12.3 G/DL — SIGNIFICANT CHANGE UP (ref 11.5–15.5)
HGB BLD-MCNC: 12.7 G/DL — SIGNIFICANT CHANGE UP (ref 11.5–15.5)
HYPOCHROMIA BLD QL: SLIGHT — SIGNIFICANT CHANGE UP
INR BLD: 2.69 RATIO — HIGH (ref 0.88–1.16)
KETONES UR-MCNC: NEGATIVE — SIGNIFICANT CHANGE UP
LACTATE BLDV-MCNC: 1.8 MMOL/L — SIGNIFICANT CHANGE UP (ref 0.7–2)
LEUKOCYTE ESTERASE UR-ACNC: NEGATIVE — SIGNIFICANT CHANGE UP
LYMPHOCYTES # BLD AUTO: 1.2 K/UL — SIGNIFICANT CHANGE UP (ref 1–3.3)
LYMPHOCYTES # BLD AUTO: 17.1 % — SIGNIFICANT CHANGE UP (ref 13–44)
MCHC RBC-ENTMCNC: 28.7 PG — SIGNIFICANT CHANGE UP (ref 27–34)
MCHC RBC-ENTMCNC: 32.7 GM/DL — SIGNIFICANT CHANGE UP (ref 32–36)
MCV RBC AUTO: 87.6 FL — SIGNIFICANT CHANGE UP (ref 80–100)
MONOCYTES # BLD AUTO: 1.2 K/UL — HIGH (ref 0–0.9)
MONOCYTES NFR BLD AUTO: 18.1 % — HIGH (ref 2–14)
NEUTROPHILS # BLD AUTO: 4.4 K/UL — SIGNIFICANT CHANGE UP (ref 1.8–7.4)
NEUTROPHILS NFR BLD AUTO: 64.4 % — SIGNIFICANT CHANGE UP (ref 43–77)
NITRITE UR-MCNC: NEGATIVE — SIGNIFICANT CHANGE UP
NT-PROBNP SERPL-SCNC: HIGH PG/ML (ref 0–300)
OVALOCYTES BLD QL SMEAR: SLIGHT — SIGNIFICANT CHANGE UP
PCO2 BLDV: 32 MMHG — LOW (ref 35–50)
PH BLDV: 7.46 — HIGH (ref 7.35–7.45)
PH UR: 6 — SIGNIFICANT CHANGE UP (ref 5–8)
PLAT MORPH BLD: NORMAL — SIGNIFICANT CHANGE UP
PLATELET # BLD AUTO: 139 K/UL — LOW (ref 150–400)
PO2 BLDV: 46 MMHG — HIGH (ref 25–45)
POIKILOCYTOSIS BLD QL AUTO: SLIGHT — SIGNIFICANT CHANGE UP
POLYCHROMASIA BLD QL SMEAR: SLIGHT — SIGNIFICANT CHANGE UP
POTASSIUM BLDV-SCNC: 4.3 MMOL/L — SIGNIFICANT CHANGE UP (ref 3.5–5)
POTASSIUM SERPL-MCNC: 4.7 MMOL/L — SIGNIFICANT CHANGE UP (ref 3.5–5.3)
POTASSIUM SERPL-SCNC: 4.7 MMOL/L — SIGNIFICANT CHANGE UP (ref 3.5–5.3)
PROT SERPL-MCNC: 7.3 G/DL — SIGNIFICANT CHANGE UP (ref 6–8.3)
PROT UR-MCNC: 150 MG/DL
PROTHROM AB SERPL-ACNC: 29.7 SEC — HIGH (ref 9.8–12.7)
RBC # BLD: 4.44 M/UL — SIGNIFICANT CHANGE UP (ref 3.8–5.2)
RBC # FLD: 16.4 % — HIGH (ref 10.3–14.5)
RBC BLD AUTO: ABNORMAL
RBC CASTS # UR COMP ASSIST: ABNORMAL /HPF (ref 0–2)
SAO2 % BLDV: 78 % — SIGNIFICANT CHANGE UP (ref 67–88)
SODIUM SERPL-SCNC: 141 MMOL/L — SIGNIFICANT CHANGE UP (ref 135–145)
SP GR SPEC: 1.02 — SIGNIFICANT CHANGE UP (ref 1.01–1.02)
TROPONIN T SERPL-MCNC: 0.16 NG/ML — HIGH (ref 0–0.06)
TROPONIN T SERPL-MCNC: 0.28 NG/ML — HIGH (ref 0–0.06)
UROBILINOGEN FLD QL: NEGATIVE — SIGNIFICANT CHANGE UP
WBC # BLD: 6.8 K/UL — SIGNIFICANT CHANGE UP (ref 3.8–10.5)
WBC # FLD AUTO: 6.8 K/UL — SIGNIFICANT CHANGE UP (ref 3.8–10.5)
WBC UR QL: SIGNIFICANT CHANGE UP /HPF (ref 0–5)

## 2017-07-27 PROCEDURE — 99285 EMERGENCY DEPT VISIT HI MDM: CPT | Mod: 25

## 2017-07-27 PROCEDURE — 71010: CPT | Mod: 26

## 2017-07-27 PROCEDURE — 93010 ELECTROCARDIOGRAM REPORT: CPT

## 2017-07-27 RX ORDER — POTASSIUM CHLORIDE 20 MEQ
10 PACKET (EA) ORAL DAILY
Qty: 0 | Refills: 0 | Status: DISCONTINUED | OUTPATIENT
Start: 2017-07-27 | End: 2017-07-31

## 2017-07-27 RX ORDER — ASPIRIN/CALCIUM CARB/MAGNESIUM 324 MG
81 TABLET ORAL DAILY
Qty: 0 | Refills: 0 | Status: DISCONTINUED | OUTPATIENT
Start: 2017-07-27 | End: 2017-07-31

## 2017-07-27 RX ORDER — FUROSEMIDE 40 MG
40 TABLET ORAL DAILY
Qty: 0 | Refills: 0 | Status: DISCONTINUED | OUTPATIENT
Start: 2017-07-27 | End: 2017-07-28

## 2017-07-27 RX ORDER — METOPROLOL TARTRATE 50 MG
100 TABLET ORAL DAILY
Qty: 0 | Refills: 0 | Status: DISCONTINUED | OUTPATIENT
Start: 2017-07-27 | End: 2017-07-31

## 2017-07-27 RX ORDER — RIVAROXABAN 15 MG-20MG
15 KIT ORAL EVERY 24 HOURS
Qty: 0 | Refills: 0 | Status: DISCONTINUED | OUTPATIENT
Start: 2017-07-27 | End: 2017-07-31

## 2017-07-27 RX ORDER — ENOXAPARIN SODIUM 100 MG/ML
40 INJECTION SUBCUTANEOUS DAILY
Qty: 0 | Refills: 0 | Status: DISCONTINUED | OUTPATIENT
Start: 2017-07-27 | End: 2017-07-27

## 2017-07-27 RX ORDER — DOCUSATE SODIUM 100 MG
100 CAPSULE ORAL DAILY
Qty: 0 | Refills: 0 | Status: DISCONTINUED | OUTPATIENT
Start: 2017-07-27 | End: 2017-07-31

## 2017-07-27 RX ORDER — FUROSEMIDE 40 MG
40 TABLET ORAL ONCE
Qty: 0 | Refills: 0 | Status: COMPLETED | OUTPATIENT
Start: 2017-07-27 | End: 2017-07-27

## 2017-07-27 RX ORDER — HYDRALAZINE HCL 50 MG
25 TABLET ORAL
Qty: 0 | Refills: 0 | Status: DISCONTINUED | OUTPATIENT
Start: 2017-07-27 | End: 2017-07-28

## 2017-07-27 RX ADMIN — Medication 40 MILLIGRAM(S): at 13:07

## 2017-07-27 RX ADMIN — RIVAROXABAN 15 MILLIGRAM(S): KIT at 23:57

## 2017-07-27 NOTE — ED PROVIDER NOTE - PHYSICAL EXAMINATION
Gen: AAO x 3, NAD  Skin: No rashes or lesions  HEENT: NC/AT, PERRLA, EOMI, MMM  Resp: unlabored decreased breath sounds right base  Cardiac: irregular s1s2, no murmurs, rubs or gallops  GI: ND, +BS, Soft, NT  Ext: 2+ pedal edema BL, FROM in all extremities  Neuro: no focal deficits Gen: AAO x 3, NAD  Skin: No rashes or lesions  HEENT: NC/AT, PERRLA, EOMI, MMM  Resp: unlabored decreased breath sounds right base  Cardiac: irregular s1s2, no murmurs, rubs or gallops  GI: ND, +BS, Soft, NT  Ext: 2+ pedal edema BL, FROM in all extremities  Neuro: no focal deficits       Attn - alert, nad, PERRL with bilat IOL, moist mm, no pallor, lungs - decreased BS R base, no wheezing rales or rhonchi, cor - irreg, irreg, 1/6 ANIL, abdo - soft, NT, ND, no CVAT, ext - nonpitting edema bilat. neuro - grossly intact.

## 2017-07-27 NOTE — H&P ADULT - HISTORY OF PRESENT ILLNESS
85 yo female with PMHx of breast ca (dx'ed in July, s/p resection, was on chemo, now in remission as per patient), HTN, HLD, PAF on Eliquis, CAD s/p CABG, CHF s/p AICD, Mitral valve regurgitation s/p clipping p/w SOB.  The patient reports that she noticed worsening SOB since last night.  Associated with worsening LE edema.  Patient denies fevers/chills, cough, CP, abd pain, NVDC, dysuria. Patient reports that she was instructed to stop taking her lasix 1 week ago

## 2017-07-27 NOTE — H&P ADULT - NSHPPHYSICALEXAM_GEN_ALL_CORE
pt. seen and examined, mild dyspenic    Vital Signs Last 24 Hrs  T(C): 36.4 (27 Jul 2017 21:00), Max: 36.7 (27 Jul 2017 18:35)  T(F): 97.6 (27 Jul 2017 21:00), Max: 98 (27 Jul 2017 18:35)  HR: 109 (27 Jul 2017 21:00) (95 - 111)  BP: 162/101 (27 Jul 2017 23:45) (145/90 - 168/110)  BP(mean): --  RR: 20 (27 Jul 2017 21:00) (20 - 28)  SpO2: 99% (27 Jul 2017 21:00) (95% - 99%)    heent: nc/at  neck: supple, no JVD  lungs: B/L basilar rales  heart: s1s2 nml  abd: soft, NABS, NT/ND  ext: edema. no c/c pulses 1+  neuro: aaox3, move all ext

## 2017-07-27 NOTE — ED PROVIDER NOTE - OBJECTIVE STATEMENT
83 yo female with PMHx of breast ca (dx'ed in July, s/p resection, was on chemo, now in remission as per patient), HTN, HLD, PAF on Eliquis, CAD s/p CABG, CHF s/p AICD, Mitral valve regurgitation s/p clipping p/w SOB.  The patient reports that she noticed worsening SOB since last night.  Associated with worsening LE edema.  Patient denies fevers/chills, cough, CP, abd pain, NVDC, dysuria. Patient reports that she was instructed to stop taking her lasix 1 week ago  Cards: Dr. Jose Wynn 83 yo female with PMHx of breast ca (dx'ed in July, s/p resection, was on chemo, now in remission as per patient), HTN, HLD, PAF on Eliquis, CAD s/p CABG, CHF s/p AICD, Mitral valve regurgitation s/p clipping p/w SOB.  The patient reports that she noticed worsening SOB since last night.  Associated with worsening LE edema.  Patient denies fevers/chills, cough, CP, abd pain, NVDC, dysuria. Patient reports that she was instructed to stop taking her lasix 1 week ago  Cards: Dr. Jose Enrique - pt seen in [facility] - agree with above - NO: cp, palp, fever, dyruria, orthopnea, n/v/d

## 2017-07-27 NOTE — ED PROVIDER NOTE - PROGRESS NOTE DETAILS
Spoke to Dr. Wynn who reports that he had wanted the patient to continue Lasix MWF and not stop medication completely.  Made aware of BNP and elevated trop.  Patient with a clean cath in March 2017.  Dr. Wynn recommending TTE to evaluate Mitral valve clip.  Will admit to medicine.  case d/w Dr. Dempsey Patient with new RBBB on EKG. Will send repeat trop.  Discussed with Dr. pickett (hospitalist) who is aware.  -Florian Chávez PA-C

## 2017-07-27 NOTE — H&P ADULT - NSHPLABSRESULTS_GEN_ALL_CORE
12.7   6.8   )-----------( 139      ( 27 Jul 2017 12:32 )             38.9       07-27    141  |  106  |  28<H>  ----------------------------<  141<H>  4.7   |  21<L>  |  1.33<H>    Ca    9.2      27 Jul 2017 12:32    TPro  7.3  /  Alb  3.8  /  TBili  1.4<H>  /  DBili  x   /  AST  54<H>  /  ALT  46<H>  /  AlkPhos  119  07-27    CARDIAC MARKERS ( 27 Jul 2017 18:42 )  x     / 0.28 ng/mL / x     / x     / x      CARDIAC MARKERS ( 27 Jul 2017 12:32 )  x     / 0.16 ng/mL / 247 U/L / x     / 33.6 ng/mL

## 2017-07-27 NOTE — ED ADULT NURSE REASSESSMENT NOTE - NS ED NURSE REASSESS COMMENT FT1
Monitor room called and said patient had wider QRS and it looked like patient had new bundle branch block.  Patient still in afib.  PA aware.  EKG repeated.

## 2017-07-27 NOTE — H&P ADULT - PROBLEM SELECTOR PLAN 4
rate controlled, pt. on xaralto at home , will continue , however unable to place order right now rate controlled, pt. on xaralto

## 2017-07-27 NOTE — ED PROVIDER NOTE - MEDICAL DECISION MAKING DETAILS
Attn - SOB <24 hrs with recent D/C of daily lasix.  check labs, ProBNP, EKG, CXR ? R effusion on exam.

## 2017-07-27 NOTE — H&P ADULT - PROBLEM SELECTOR PLAN 1
her lasix was stoped few last week as she was c/o excessive urination, now develop SOB and leg edema  -start lasix 40 mg IV  -cardio consult  echo her lasix was stoped few last week as she was c/o excessive urination, now develop SOB and leg edema  -start lasix 40 mg IV  -cardio consult: not sure her pvt cardio Dr. huntley comes or not, if don't come to hospital, then consult house cardio  echo

## 2017-07-27 NOTE — ED ADULT NURSE NOTE - OBJECTIVE STATEMENT
Pt ambulatory to ED in no apparent distress c/o increased SOB since yesterday PM.  Pt AXOX2 to person, place, disoriented to year.  Pt calm, cooperative.  Pt denies dizziness, N/V/D, Cp, bleeding, dark stools, fever/chills, injury/falls.  Pt IV 20 gauge placed in left hand.  Pt speaking clearly.  Pt also c/o increasing swelling in b/l LE.  Pt Son at bedside.  Will continue to monitor. Pt ambulatory to ED in no apparent distress c/o increased SOB since yesterday PM.  Pt AXOX2 to person, place, disoriented to year.  Pt calm, cooperative.  Pt denies dizziness, N/V/D, Cp, bleeding, dark stools, fever/chills, injury/falls.  F/S 133 upon arrival, completed by Tanner Tech.  Pt IV 20 gauge placed in left hand.  Pt speaking clearly.  Pt also c/o increasing swelling in b/l LE.  Pt Son at bedside.  Will continue to monitor.

## 2017-07-28 DIAGNOSIS — N17.9 ACUTE KIDNEY FAILURE, UNSPECIFIED: ICD-10-CM

## 2017-07-28 LAB
ALBUMIN SERPL ELPH-MCNC: 3.5 G/DL — SIGNIFICANT CHANGE UP (ref 3.3–5)
ALP SERPL-CCNC: 102 U/L — SIGNIFICANT CHANGE UP (ref 40–120)
ALT FLD-CCNC: 43 U/L RC — SIGNIFICANT CHANGE UP (ref 10–45)
ANION GAP SERPL CALC-SCNC: 16 MMOL/L — SIGNIFICANT CHANGE UP (ref 5–17)
AST SERPL-CCNC: 52 U/L — HIGH (ref 10–40)
BILIRUB SERPL-MCNC: 1.2 MG/DL — SIGNIFICANT CHANGE UP (ref 0.2–1.2)
BUN SERPL-MCNC: 30 MG/DL — HIGH (ref 7–23)
CALCIUM SERPL-MCNC: 8.8 MG/DL — SIGNIFICANT CHANGE UP (ref 8.4–10.5)
CHLORIDE SERPL-SCNC: 104 MMOL/L — SIGNIFICANT CHANGE UP (ref 96–108)
CK MB BLD-MCNC: 10.9 % — HIGH (ref 0–3.5)
CK MB CFR SERPL CALC: 15.1 NG/ML — HIGH (ref 0–3.8)
CK SERPL-CCNC: 139 U/L — SIGNIFICANT CHANGE UP (ref 25–170)
CO2 SERPL-SCNC: 24 MMOL/L — SIGNIFICANT CHANGE UP (ref 22–31)
CREAT SERPL-MCNC: 1.42 MG/DL — HIGH (ref 0.5–1.3)
GLUCOSE SERPL-MCNC: 103 MG/DL — HIGH (ref 70–99)
HCT VFR BLD CALC: 35.5 % — SIGNIFICANT CHANGE UP (ref 34.5–45)
HGB BLD-MCNC: 11.8 G/DL — SIGNIFICANT CHANGE UP (ref 11.5–15.5)
MCHC RBC-ENTMCNC: 29.3 PG — SIGNIFICANT CHANGE UP (ref 27–34)
MCHC RBC-ENTMCNC: 33.3 GM/DL — SIGNIFICANT CHANGE UP (ref 32–36)
MCV RBC AUTO: 88 FL — SIGNIFICANT CHANGE UP (ref 80–100)
PLATELET # BLD AUTO: 124 K/UL — LOW (ref 150–400)
POTASSIUM SERPL-MCNC: 3.8 MMOL/L — SIGNIFICANT CHANGE UP (ref 3.5–5.3)
POTASSIUM SERPL-SCNC: 3.8 MMOL/L — SIGNIFICANT CHANGE UP (ref 3.5–5.3)
PROT SERPL-MCNC: 6.6 G/DL — SIGNIFICANT CHANGE UP (ref 6–8.3)
RBC # BLD: 4.03 M/UL — SIGNIFICANT CHANGE UP (ref 3.8–5.2)
RBC # FLD: 16.5 % — HIGH (ref 10.3–14.5)
SODIUM SERPL-SCNC: 144 MMOL/L — SIGNIFICANT CHANGE UP (ref 135–145)
TROPONIN T SERPL-MCNC: 0.34 NG/ML — HIGH (ref 0–0.06)
WBC # BLD: 6.1 K/UL — SIGNIFICANT CHANGE UP (ref 3.8–10.5)
WBC # FLD AUTO: 6.1 K/UL — SIGNIFICANT CHANGE UP (ref 3.8–10.5)

## 2017-07-28 PROCEDURE — 93306 TTE W/DOPPLER COMPLETE: CPT | Mod: 26

## 2017-07-28 RX ORDER — FUROSEMIDE 40 MG
20 TABLET ORAL DAILY
Qty: 0 | Refills: 0 | Status: DISCONTINUED | OUTPATIENT
Start: 2017-07-28 | End: 2017-07-31

## 2017-07-28 RX ORDER — HYDRALAZINE HCL 50 MG
50 TABLET ORAL
Qty: 0 | Refills: 0 | Status: DISCONTINUED | OUTPATIENT
Start: 2017-07-28 | End: 2017-07-28

## 2017-07-28 RX ORDER — HYDRALAZINE HCL 50 MG
50 TABLET ORAL THREE TIMES A DAY
Qty: 0 | Refills: 0 | Status: DISCONTINUED | OUTPATIENT
Start: 2017-07-28 | End: 2017-07-31

## 2017-07-28 RX ADMIN — RIVAROXABAN 15 MILLIGRAM(S): KIT at 17:06

## 2017-07-28 RX ADMIN — Medication 40 MILLIGRAM(S): at 05:31

## 2017-07-28 RX ADMIN — Medication 10 MILLIEQUIVALENT(S): at 13:27

## 2017-07-28 RX ADMIN — Medication 81 MILLIGRAM(S): at 13:27

## 2017-07-28 RX ADMIN — Medication 100 MILLIGRAM(S): at 13:27

## 2017-07-28 RX ADMIN — Medication 100 MILLIGRAM(S): at 05:31

## 2017-07-28 RX ADMIN — Medication 50 MILLIGRAM(S): at 22:03

## 2017-07-28 RX ADMIN — Medication 25 MILLIGRAM(S): at 00:05

## 2017-07-28 RX ADMIN — Medication 25 MILLIGRAM(S): at 13:27

## 2017-07-28 NOTE — PROGRESS NOTE ADULT - SUBJECTIVE AND OBJECTIVE BOX
Patient is a 84y old  Female who presents with a chief complaint of SOB (2017 19:06)  Patient seen and examined, feels better.    INTERVAL HPI/OVERNIGHT EVENTS:  T(C): 36.4 (17 @ 21:08), Max: 36.6 (17 @ 13:30)  HR: 79 (17 @ 21:08) (79 - 92)  BP: 133/77 (17 @ 21:08) (111/67 - 162/101)  RR: 18 (17 @ 21:08) (18 - 19)  SpO2: 96% (17 @ 21:08) (96% - 99%)  Wt(kg): --  I&O's Summary    2017 07:01  -  2017 21:13  --------------------------------------------------------  IN: 540 mL / OUT: 0 mL / NET: 540 mL        PAST MEDICAL & SURGICAL HISTORY:  Type 2 diabetes mellitus: Diet controlled, Hg A1C 6.1 ( 17)  Dyspnea: at rest  Non-rheumatic mitral regurgitation: Severe  Breast cancer: s/p resection, chemo x 3 treatments only, completed radiation  Hypertension  HLD (hyperlipidemia)  PAF (paroxysmal atrial fibrillation): diagnosed 2013 , on Aspirin only, no longer taking  Eliquis  CAD (coronary artery disease)  Cataract: b/l  Gout  HTN  GERD (gastroesophageal reflux disease)  S/P breast lumpectomy: left 2016  S/P cataract extraction: bilaterally- withy artificial lenses  S/P CABG: x3 on 13  S/P hysterectomy      REVIEW OF SYSTEMS:  CONSTITUTIONAL: No fever, weight loss, or fatigue  EYES: No eye pain, visual disturbances, or discharge  ENMT:  No difficulty hearing, tinnitus, vertigo; No sinus or throat pain  NECK: No pain or stiffness  RESPIRATORY: No cough, wheezing, chills or hemoptysis; No shortness of breath  CARDIOVASCULAR: No chest pain, palpitations, dizziness, + leg swelling  GASTROINTESTINAL: No abdominal or epigastric pain. No nausea, vomiting, or hematemesis; No diarrhea or constipation. No melena or hematochezia.  GENITOURINARY: No dysuria, frequency, hematuria, or incontinence  NEUROLOGICAL: No headaches, memory loss, loss of strength, numbness, or tremors  SKIN: No itching, burning, rashes, or lesions   LYMPH NODES: No enlarged glands  ENDOCRINE: No heat or cold intolerance; No hair loss  MUSCULOSKELETAL: No joint pain or swelling; No muscle, back, or extremity pain  PSYCHIATRIC: No depression, anxiety, mood swings, or difficulty sleeping  HEME/LYMPH: No easy bruising, or bleeding gums  ALLERY AND IMMUNOLOGIC: No hives or eczema    RADIOLOGY & ADDITIONAL TESTS:    Imaging Personally Reviewed:  [ ] YES  [ ] NO    Consultant(s) Notes Reviewed:  [ ] YES  [ ] NO    PHYSICAL EXAM:  GENERAL: NAD, well-groomed, well-developed  HEAD:  Atraumatic, Normocephalic  EYES: EOMI, PERRLA, conjunctiva and sclera clear  ENMT: No tonsillar erythema, exudates, or enlargement; Moist mucous membranes, Good dentition, No lesions  NECK: Supple, No JVD, Normal thyroid  NERVOUS SYSTEM:  Alert & Oriented X3, Good concentration; Motor Strength 5/5 B/L upper and lower extremities; DTRs 2+ intact and symmetric  CHEST/LUNG: Clear to percussion bilaterally; No rales, rhonchi, wheezing, or rubs  HEART: Regular rate and rhythm; No murmurs, rubs, or gallops  ABDOMEN: Soft, Nontender, Nondistended; Bowel sounds present  EXTREMITIES:  2+ Peripheral Pulses, No clubbing, cyanosis, + edema  LYMPH: No lymphadenopathy noted  SKIN: No rashes or lesions    LABS:                        11.8   6.1   )-----------( 124      ( 2017 06:29 )             35.5     -    144  |  104  |  30<H>  ----------------------------<  103<H>  3.8   |  24  |  1.42<H>    Ca    8.8      2017 06:29    TPro  6.6  /  Alb  3.5  /  TBili  1.2  /  DBili  x   /  AST  52<H>  /  ALT  43  /  AlkPhos  102      PT/INR - ( 2017 12:32 )   PT: 29.7 sec;   INR: 2.69 ratio         PTT - ( 2017 12:32 )  PTT:35.5 sec  Urinalysis Basic - ( 2017 13:39 )    Color: Yellow / Appearance: Clear / S.018 / pH: x  Gluc: x / Ketone: Negative  / Bili: Negative / Urobili: Negative   Blood: x / Protein: 150 mg/dL / Nitrite: Negative   Leuk Esterase: Negative / RBC: 5-10 /HPF / WBC 6-10 /HPF   Sq Epi: x / Non Sq Epi: OCC /HPF / Bacteria: x      CAPILLARY BLOOD GLUCOSE  105 (2017 18:18)  91 (2017 08:54)  111 (2017 21:17)            Urinalysis Basic - ( 2017 13:39 )    Color: Yellow / Appearance: Clear / S.018 / pH: x  Gluc: x / Ketone: Negative  / Bili: Negative / Urobili: Negative   Blood: x / Protein: 150 mg/dL / Nitrite: Negative   Leuk Esterase: Negative / RBC: 5-10 /HPF / WBC 6-10 /HPF   Sq Epi: x / Non Sq Epi: OCC /HPF / Bacteria: x        MEDICATIONS  (STANDING):  aspirin enteric coated 81 milliGRAM(s) Oral daily  metoprolol succinate  milliGRAM(s) Oral daily  docusate sodium 100 milliGRAM(s) Oral daily  potassium chloride    Tablet ER 10 milliEquivalent(s) Oral daily  rivaroxaban 15 milliGRAM(s) Oral every 24 hours  furosemide    Tablet 20 milliGRAM(s) Oral daily  hydrALAZINE 50 milliGRAM(s) Oral three times a day    MEDICATIONS  (PRN):      Care Discussed with Consultants/Other Providers [ ] YES  [ ] NO

## 2017-07-28 NOTE — CONSULT NOTE ADULT - SUBJECTIVE AND OBJECTIVE BOX
CHIEF COMPLAINT:Patient is a 84y old  Female who presents with a chief complaint of SOB (27 Jul 2017 19:06)      HISTORY OF PRESENT ILLNESS:HPI:  85 y/o F with PMx of breast ca (dx'ed in July, s/p resection, was on chemo, now in remission as per patient), HTN, HLD, PAF(on Xarelto) severe MR s/p Mitraclip 5/2017 s/p EPS inducible VT s/p AICD implant p/w SOB. The patient reports that she noticed worsening SOB since night prior to admission.  Associated with worsening LE edema.  Patient denies fevers/chills, cough, CP, abd pain, NVDC, dysuria. Patient reports that she was instructed to stop taking her lasix 1 week ago (27 Jul 2017 19:06)      PAST MEDICAL & SURGICAL HISTORY:  Type 2 diabetes mellitus: Diet controlled, Hg A1C 6.1 ( 4/22/17)  Dyspnea: at rest  Non-rheumatic mitral regurgitation: Severe  Breast cancer: s/p resection, chemo x 3 treatments only, completed radiation  Hypertension  HLD (hyperlipidemia)  PAF (paroxysmal atrial fibrillation): diagnosed 7/2013 , on Aspirin only, no longer taking  Eliquis  CAD (coronary artery disease)  Cataract: b/l  Gout  HTN  GERD (gastroesophageal reflux disease)  S/P breast lumpectomy: left 7/20/2016  S/P cataract extraction: bilaterally-2014 withy artificial lenses  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  S/P hysterectomy          MEDICATIONS:  aspirin enteric coated 81 milliGRAM(s) Oral daily  metoprolol succinate  milliGRAM(s) Oral daily  hydrALAZINE 50 milliGRAM(s) Oral 3 times a day  furosemide   Injectable 40 milliGRAM(s) IV Push daily  rivaroxaban 15 milliGRAM(s) Oral every 24 hours          docusate sodium 100 milliGRAM(s) Oral daily      potassium chloride    Tablet ER 10 milliEquivalent(s) Oral daily      FAMILY HISTORY:  No pertinent family history in first degree relatives      Non-contributory    SOCIAL HISTORY:    not a smoker    Allergies    No Known Allergies    Intolerances    	    REVIEW OF SYSTEMS:  CONSTITUTIONAL: No fever  EYES: No eye pain, visual disturbances, or discharge  ENMT:  No difficulty hearing, tinnitus  NECK: No pain or stiffness  RESPIRATORY: sob  CARDIOVASCULAR: No chest pain, palpitations, passing out, dizziness, or leg swelling  GASTROINTESTINAL:  No nausea, vomiting, diarrhea or constipation. No melena.  GENITOURINARY: No dysuria, hematuria  NEUROLOGICAL: No stroke like symptoms  SKIN: No burning or lesions   LYMPH Nodes: No enlarged glands  ENDOCRINE: No heat or cold intolerance  MUSCULOSKELETAL: No joint pain or swelling  PSYCHIATRIC: No  anxiety, mood swings  HEME/LYMPH: No bleeding gums  ALLERY AND IMMUNOLOGIC: No hives or eczema	    All other ROS negative    PHYSICAL EXAM:  T(C): 36.6 (07-28-17 @ 13:30), Max: 36.6 (07-28-17 @ 13:30)  HR: 92 (07-28-17 @ 13:30) (90 - 109)  BP: 162/87 (07-28-17 @ 13:30) (111/67 - 162/101)  RR: 18 (07-28-17 @ 13:30) (18 - 20)  SpO2: 99% (07-28-17 @ 13:30) (98% - 99%)  Wt(kg): --  I&O's Summary    28 Jul 2017 07:01  -  28 Jul 2017 18:35  --------------------------------------------------------  IN: 540 mL / OUT: 0 mL / NET: 540 mL        Appearance: Normal	  HEENT:   Normal oral mucosa, EOMI	  Lymphatic: No lymphadenopathy  Cardiovascular: Normal S1 S2, No JVD, ANIL  Respiratory: Lungs clear to auscultation	  Psychiatry: Alert, Mood & affect appropriate  Gastrointestinal:  Soft, Non-tender, + BS	  Skin: No rashes, No ecchymoses, No cyanosis	  Neurologic: Non-focal  Extremities:  No clubbing, cyanosis or edema  Vascular: Peripheral pulses palpable 2+ bilaterally  	    ECG:  	af rbbb (new)  RADIOLOGY:  	  	  CARDIAC MARKERS:  Troponin T, Serum: 0.34 ng/mL (07-28 @ 10:34)  Troponin T, Serum: 0.28 ng/mL (07-27 @ 18:42)  Troponin T, Serum: 0.16 ng/mL (07-27 @ 12:32)                                  11.8   6.1   )-----------( 124      ( 28 Jul 2017 06:29 )             35.5     07-28    144  |  104  |  30<H>  ----------------------------<  103<H>  3.8   |  24  |  1.42<H>    Ca    8.8      28 Jul 2017 06:29    TPro  6.6  /  Alb  3.5  /  TBili  1.2  /  DBili  x   /  AST  52<H>  /  ALT  43  /  AlkPhos  102  07-28    proBNP: Serum Pro-Brain Natriuretic Peptide: 01418 pg/mL (07-27 @ 12:32)          Assesment/Plan:   85 y/o F with PMx of breast ca (dx'ed in July, s/p resection, was on chemo, now in remission as per patient), HTN, HLD, PAF(on Xarelto) severe MR s/p Mitraclip 5/2017 s/p EPS inducible VT s/p AICD implant p/w SOB. The patient reports that she noticed worsening SOB and LE edema in setting of stopping Lasix last week  1. Acute on chronic decompensated systolic HF with MR -   - increase Hydral to home dose of 50mg BID  - ACE on hold given mild MYA  - Switch Lasix to Lasix 20mg PO daily  - Continue with metoprolol    2. AF - overall well controlled.   - c/w Metoprolol and Xarelto    3. HTN - Increase to Hydral 50 mg TID  - restart ACE if BP remains uncontrolled despite above    4. MR s/p clip - Afterload reduction with Hydral  - on ASA    5. HLD - resume Zetia 10mg    6. Anticipate discharge over the next 48 hours          Patel Amaya DO [facility]  [facility]  [phone]

## 2017-07-29 LAB
-  AMIKACIN: SIGNIFICANT CHANGE UP
-  AMPICILLIN/SULBACTAM: SIGNIFICANT CHANGE UP
-  AMPICILLIN: SIGNIFICANT CHANGE UP
-  AZTREONAM: SIGNIFICANT CHANGE UP
-  CEFAZOLIN: SIGNIFICANT CHANGE UP
-  CEFEPIME: SIGNIFICANT CHANGE UP
-  CEFOXITIN: SIGNIFICANT CHANGE UP
-  CEFTAZIDIME: SIGNIFICANT CHANGE UP
-  CEFTRIAXONE: SIGNIFICANT CHANGE UP
-  CIPROFLOXACIN: SIGNIFICANT CHANGE UP
-  ERTAPENEM: SIGNIFICANT CHANGE UP
-  GENTAMICIN: SIGNIFICANT CHANGE UP
-  LEVOFLOXACIN: SIGNIFICANT CHANGE UP
-  MEROPENEM: SIGNIFICANT CHANGE UP
-  NITROFURANTOIN: SIGNIFICANT CHANGE UP
-  PIPERACILLIN/TAZOBACTAM: SIGNIFICANT CHANGE UP
-  TOBRAMYCIN: SIGNIFICANT CHANGE UP
-  TRIMETHOPRIM/SULFAMETHOXAZOLE: SIGNIFICANT CHANGE UP
ANION GAP SERPL CALC-SCNC: 16 MMOL/L — SIGNIFICANT CHANGE UP (ref 5–17)
BUN SERPL-MCNC: 35 MG/DL — HIGH (ref 7–23)
CALCIUM SERPL-MCNC: 8.2 MG/DL — LOW (ref 8.4–10.5)
CHLORIDE SERPL-SCNC: 106 MMOL/L — SIGNIFICANT CHANGE UP (ref 96–108)
CO2 SERPL-SCNC: 23 MMOL/L — SIGNIFICANT CHANGE UP (ref 22–31)
CREAT SERPL-MCNC: 1.33 MG/DL — HIGH (ref 0.5–1.3)
CULTURE RESULTS: SIGNIFICANT CHANGE UP
GLUCOSE SERPL-MCNC: 90 MG/DL — SIGNIFICANT CHANGE UP (ref 70–99)
HCT VFR BLD CALC: 34.3 % — LOW (ref 34.5–45)
HGB BLD-MCNC: 11.3 G/DL — LOW (ref 11.5–15.5)
MCHC RBC-ENTMCNC: 29 PG — SIGNIFICANT CHANGE UP (ref 27–34)
MCHC RBC-ENTMCNC: 33 GM/DL — SIGNIFICANT CHANGE UP (ref 32–36)
MCV RBC AUTO: 87.9 FL — SIGNIFICANT CHANGE UP (ref 80–100)
METHOD TYPE: SIGNIFICANT CHANGE UP
ORGANISM # SPEC MICROSCOPIC CNT: SIGNIFICANT CHANGE UP
ORGANISM # SPEC MICROSCOPIC CNT: SIGNIFICANT CHANGE UP
PLATELET # BLD AUTO: 127 K/UL — LOW (ref 150–400)
POTASSIUM SERPL-MCNC: 3.7 MMOL/L — SIGNIFICANT CHANGE UP (ref 3.5–5.3)
POTASSIUM SERPL-SCNC: 3.7 MMOL/L — SIGNIFICANT CHANGE UP (ref 3.5–5.3)
RBC # BLD: 3.9 M/UL — SIGNIFICANT CHANGE UP (ref 3.8–5.2)
RBC # FLD: 16.4 % — HIGH (ref 10.3–14.5)
SODIUM SERPL-SCNC: 145 MMOL/L — SIGNIFICANT CHANGE UP (ref 135–145)
SPECIMEN SOURCE: SIGNIFICANT CHANGE UP
WBC # BLD: 5.3 K/UL — SIGNIFICANT CHANGE UP (ref 3.8–10.5)
WBC # FLD AUTO: 5.3 K/UL — SIGNIFICANT CHANGE UP (ref 3.8–10.5)

## 2017-07-29 RX ORDER — ACETAMINOPHEN 500 MG
650 TABLET ORAL ONCE
Qty: 0 | Refills: 0 | Status: COMPLETED | OUTPATIENT
Start: 2017-07-29 | End: 2017-07-29

## 2017-07-29 RX ORDER — LISINOPRIL 2.5 MG/1
5 TABLET ORAL DAILY
Qty: 0 | Refills: 0 | Status: DISCONTINUED | OUTPATIENT
Start: 2017-07-29 | End: 2017-07-31

## 2017-07-29 RX ADMIN — Medication 100 MILLIGRAM(S): at 06:12

## 2017-07-29 RX ADMIN — Medication 50 MILLIGRAM(S): at 13:02

## 2017-07-29 RX ADMIN — RIVAROXABAN 15 MILLIGRAM(S): KIT at 17:51

## 2017-07-29 RX ADMIN — Medication 100 MILLIGRAM(S): at 11:21

## 2017-07-29 RX ADMIN — Medication 10 MILLIEQUIVALENT(S): at 11:21

## 2017-07-29 RX ADMIN — Medication 650 MILLIGRAM(S): at 01:10

## 2017-07-29 RX ADMIN — Medication 81 MILLIGRAM(S): at 11:21

## 2017-07-29 RX ADMIN — Medication 50 MILLIGRAM(S): at 21:47

## 2017-07-29 RX ADMIN — Medication 20 MILLIGRAM(S): at 05:34

## 2017-07-29 RX ADMIN — Medication 650 MILLIGRAM(S): at 00:38

## 2017-07-29 RX ADMIN — Medication 50 MILLIGRAM(S): at 05:34

## 2017-07-29 NOTE — PROGRESS NOTE ADULT - SUBJECTIVE AND OBJECTIVE BOX
INTERVAL HISTORY: feels well, no SOB    TELEMETRY: no events  	  MEDICATIONS:  metoprolol succinate  milliGRAM(s) Oral daily  furosemide    Tablet 20 milliGRAM(s) Oral daily  hydrALAZINE 50 milliGRAM(s) Oral three times a day  lisinopril 5 milliGRAM(s) Oral daily        PHYSICAL EXAM:  T(C): 36.4 (07-29-17 @ 21:18), Max: 36.8 (07-29-17 @ 05:30)  HR: 67 (07-29-17 @ 21:18) (61 - 75)  BP: 148/81 (07-29-17 @ 21:18) (138/77 - 154/80)  RR: 18 (07-29-17 @ 21:18) (17 - 18)  SpO2: 98% (07-29-17 @ 21:18) (95% - 98%)  Wt(kg): --  I&O's Summary    28 Jul 2017 07:01  -  29 Jul 2017 07:00  --------------------------------------------------------  IN: 840 mL / OUT: 0 mL / NET: 840 mL    29 Jul 2017 07:01  -  29 Jul 2017 23:22  --------------------------------------------------------  IN: 700 mL / OUT: 0 mL / NET: 700 mL          Appearance: Normal	  HEENT:    PERRL, EOMI	  Lymphatic: No lymphadenopathy  Cardiovascular: Normal S1 S2, No JVD  Respiratory: Lungs clear to auscultation	  Psychiatry: Alert, Mood & affect appropriate  Gastrointestinal:  Soft, Non-tender, + BS	  Skin: No rashes, No cyanosis  Neurologic: Non-focal  Extremities:  No  cyanosis or edema  Vascular: Peripheral pulses palpable  bilaterally      CARDIAC MARKERS:                                  11.3   5.3   )-----------( 127      ( 29 Jul 2017 06:21 )             34.3     07-29    145  |  106  |  35<H>  ----------------------------<  90  3.7   |  23  |  1.33<H>    Ca    8.2<L>      29 Jul 2017 06:21    TPro  6.6  /  Alb  3.5  /  TBili  1.2  /  DBili  x   /  AST  52<H>  /  ALT  43  /  AlkPhos  102  07-28    proBNP:   Lipid Profile:   HgA1c:   TSH:     ASSESSMENT/PLAN: 	  85 y/o F with PMx of breast ca (dx'ed in July, s/p resection, was on chemo, now in remission as per patient), HTN, HLD, PAF(on Xarelto) severe MR s/p Mitraclip 5/2017 s/p EPS inducible VT s/p AICD implant p/w SOB. The patient reports that she noticed worsening SOB and LE edema in setting of stopping Lasix last week  1. Acute on chronic decompensated systolic HF with MR -   -  Hydral to home dose of 50mg BID  - restart Lisinopril 5mg daily  -  Lasix 20mg PO daily  - Continue with metoprolol    2. AF - overall well controlled.   - c/w Metoprolol and Xarelto    3. HTN - Increase to Hydral 50 mg TID  - restart Lisinopril 5mg PO qd    4. MR s/p clip - Afterload reduction with Hydral  - on ASA    5. HLD - resume Zetia 10mg    6. Anticipate discharge over the next 24 hours with outpt follow up with her cardiologist within one week        Patel Amaya DO [facility]  [facility]  [phone]

## 2017-07-29 NOTE — PROGRESS NOTE ADULT - SUBJECTIVE AND OBJECTIVE BOX
Patient is a 84y old  Female who presents with a chief complaint of SOB (27 Jul 2017 19:06)  Patient seen and examined, feels better.    Vital Signs Last 24 Hrs  T(C): 36.4 (29 Jul 2017 21:18), Max: 36.8 (29 Jul 2017 05:30)  T(F): 97.6 (29 Jul 2017 21:18), Max: 98.2 (29 Jul 2017 05:30)  HR: 67 (29 Jul 2017 21:18) (61 - 75)  BP: 148/81 (29 Jul 2017 21:18) (138/77 - 154/80)  BP(mean): --  RR: 18 (29 Jul 2017 21:18) (17 - 18)  SpO2: 98% (29 Jul 2017 21:18) (95% - 98%)        PAST MEDICAL & SURGICAL HISTORY:  Type 2 diabetes mellitus: Diet controlled, Hg A1C 6.1 ( 4/22/17)  Dyspnea: at rest  Non-rheumatic mitral regurgitation: Severe  Breast cancer: s/p resection, chemo x 3 treatments only, completed radiation  Hypertension  HLD (hyperlipidemia)  PAF (paroxysmal atrial fibrillation): diagnosed 7/2013 , on Aspirin only, no longer taking  Eliquis  CAD (coronary artery disease)  Cataract: b/l  Gout  HTN  GERD (gastroesophageal reflux disease)  S/P breast lumpectomy: left 7/20/2016  S/P cataract extraction: bilaterally-2014 withy artificial lenses  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 No nausea, vomiting, or hematemesis; No diarrhea or constipation. No melena or hematochezia.  GENITOURINARY: No dysuria, frequency, hematuria, or incontinence  NEUROLOGICAL: No headaches, memory loss, loss of strength, numbness, or tremors  SKIN: No itching, burning, rashes, or lesions   LYMPH NODES: No enlarged glands  ENDOCRINE: No heat or cold intolerance; No hair loss  MUSCULOSKELETAL: No joint pain or swelling; No muscle, back, or extremity pain  PSYCHIATRIC: No depression, anxiety, mood swings, or difficulty sleeping  HEME/LYMPH: No easy bruising, or bleeding gums  ALLERY AND IMMUNOLOGIC: No hives or eczema    RADIOLOGY & ADDITIONAL TESTS:    Imaging Personally Reviewed:  [ ] YES  [ ] NO    Consultant(s) Notes Reviewed:  [ ] YES  [ ] NO    PHYSICAL EXAM:  GENERAL: NAD, well-groomed, well-developed  HEAD:  Atraumatic, Normocephalic  EYES: EOMI, PERRLA, conjunctiva and sclera clear  ENMT: No tonsillar erythema, exudates, or enlargement; Moist mucous membranes, Good dentition, No lesions  NECK: Supple, No JVD, Normal thyroid  NERVOUS SYSTEM:  Alert & Oriented X3, Good concentration; Motor Strength 5/5 B/L upper and lower extremities; DTRs 2+ intact and symmetric  CHEST/LUNG: Clear to percussion bilaterally; No rales, rhonchi, wheezing, or rubs  HEART: Regular rate and rhythm; No murmurs, rubs, or gallops  ABDOMEN: Soft, Nontender, Nondistended; Bowel sounds present  EXTREMITIES:  2+ Peripheral Pulses, No clubbing, cyanosis, + edema ( decreased )  LYMPH: No lymphadenopathy noted  SKIN: No rashes or lesions    LABS:                                   11.3   5.3   )-----------( 127      ( 29 Jul 2017 06:21 )             34.3     07-29    145  |  106  |  35<H>  ----------------------------<  90  3.7   |  23  |  1.33<H>    Ca    8.2<L>      29 Jul 2017 06:21    TPro  6.6  /  Alb  3.5  /  TBili  1.2  /  DBili  x   /  AST  52<H>  /  ALT  43  /  AlkPhos  102  07-28    < from: TTE with Doppler (w/Cont) (07.28.17 @ 18:57) >  Patient name: ELY WOOTEN  YOB: 1933   Age: 84 (F)   MR#: [id number]  Study Date: 7/28/2017  [facility]  Study quality: Technically difficult  Referring Physician: Morris Pichardo MD  Blood Pressure: 121/70 mmHg  Height: 163 cm  Weight: 66 kg  BSA: 1.7 m2  ------------------------------------------------------------------------  PROCEDURE: Transthoracic echocardiogram with 2-D, M-Mode  and complete spectral and color flow Doppler. Verbal  consent was obtained for injection of echo contrast  following a discussion of risks and benefits. Following  intravenous injection of contrast, harmonic imaging was  performed.  INDICATION: Abnormal electrocardiogram (ECG) (EKG) (R94.31)  ------------------------------------------------------------------------  Dimensions:    Normal Values:  LA:     5.6    2.0 - 4.0 cm  Ao:     2.8    2.0 - 3.8 cm  SEPTUM: 1.1    0.6 - 1.2 cm  PWT:    1.1    0.6 - 1.1 cm  LVIDd:  5.1    3.0 - 5.6 cm  LVIDs:  4.0    1.8 -4.0 cm  Derived variables:  LVMI: 125 g/m2  RWT: 0.43  Fractional short: 22 %  EF (Visual Estimate): 40 %  Doppler Peak Velocity (m/sec): AoV=1.7  ------------------------------------------------------------------------  Observations:  Mitral Valve: Mitraclip seen attached to both anterior and  posterior mitral valve leaflets. Moderate mitral  regurgitation. Mean transmitral valve gradient equals 6-7  mm Hg (at HR 70-80 bpm), which is elevated even in the  setting of a repaired valve.  Aortic Valve/Aorta: Calcified trileaflet aortic valve. Peak  transaortic valve gradient equals 12 mm Hg, mean  transaortic valve gradient equals 5 mm Hg, aortic valve  velocity time integral equals 37 cm. Peak left ventricular  outflow tract gradient equals 3 mm Hg, mean gradient is  equal to 1 mm Hg, LVOT velocity time integral equals 16 cm.  Aortic Root: 2.8 cm.  LVOT diameter: 1.7 cm.  Left Atrium: Severely dilated left atrium.  LA volume index  = 96 cc/m2.  Left Ventricle: Moderate segmental left ventricular  systolic dysfunction.  Endocardial visualization enhanced  with intravenous injection of echo contrast (Definity).  No  left ventricular thrombus. The apical segments are  akinetic. The inferoseptum and anteroseptum are  hypokinetic. Mild concentric left ventricular hypertrophy.  Right Heart: Right atrial enlargement. A device wire is  noted in the right heart.  Right ventricular enlargement  with decreased right ventricular systolic function.  Tethered tricuspid valve. Moderate tricuspid regurgitation.  Normal pulmonic valve. Minimal pulmonic regurgitation.  Pericardium/Pleura: No pericardial effusion seen.  Hemodynamic: Estimated right atrial pressure is 8 mm Hg.  Estimated right ventricular systolic pressure equals 46 mm  Hg, assuming right atrial pressure equals 8 mm Hg,  consistent with mild pulmonary hypertension.  ------------------------------------------------------------------------  Conclusions:  1. Mitraclip seen attached to both anterior and posterior  mitral valve leaflets. Moderate mitral regurgitation. Mean  transmitral valve gradient equals 6-7 mm Hg (at HR 70-80  bpm), which is elevated even in the setting of a repaired  valve.  2. Moderate segmental left ventricular systolic  dysfunction.  Endocardial visualization enhanced with  intravenous injection of echo contrast (Definity).  No left  ventricular thrombus. The apical segments are akinetic. The  inferoseptum and anteroseptum are hypokinetic.  3. A device wire is noted in the right heart.  Right  ventricular enlargement with decreased right ventricular  systolic function.  4. No pericardial effusion seen.  *** Compared with transthoracic echocardiogram of  5/17/2017, findings are similar.  ------------------------------------------------------------------------  Confirmed on  7/28/2017 - 13:28:03 by Krishna Rueda M.D.  ------------------------------------------------------------------------    < end of copied text >      MEDICATIONS  (STANDING):  aspirin enteric coated 81 milliGRAM(s) Oral daily  metoprolol succinate  milliGRAM(s) Oral daily  docusate sodium 100 milliGRAM(s) Oral daily  potassium chloride    Tablet ER 10 milliEquivalent(s) Oral daily  rivaroxaban 15 milliGRAM(s) Oral every 24 hours  furosemide    Tablet 20 milliGRAM(s) Oral daily  hydrALAZINE 50 milliGRAM(s) Oral three times a day    MEDICATIONS  (PRN):      Care Discussed with Consultants/Other Providers [ ] YES  [ ] NO

## 2017-07-29 NOTE — PROVIDER CONTACT NOTE (OTHER) - ASSESSMENT
A&OX4, c/o pain in the right foot sharp cramps, no swelling/redness/heat; vital signs WNL; pain 6/10

## 2017-07-30 ENCOUNTER — TRANSCRIPTION ENCOUNTER (OUTPATIENT)
Age: 82
End: 2017-07-30

## 2017-07-30 LAB
ANION GAP SERPL CALC-SCNC: 14 MMOL/L — SIGNIFICANT CHANGE UP (ref 5–17)
BUN SERPL-MCNC: 36 MG/DL — HIGH (ref 7–23)
CALCIUM SERPL-MCNC: 8.2 MG/DL — LOW (ref 8.4–10.5)
CHLORIDE SERPL-SCNC: 104 MMOL/L — SIGNIFICANT CHANGE UP (ref 96–108)
CO2 SERPL-SCNC: 24 MMOL/L — SIGNIFICANT CHANGE UP (ref 22–31)
CREAT SERPL-MCNC: 1.29 MG/DL — SIGNIFICANT CHANGE UP (ref 0.5–1.3)
GLUCOSE SERPL-MCNC: 96 MG/DL — SIGNIFICANT CHANGE UP (ref 70–99)
MAGNESIUM SERPL-MCNC: 1.5 MG/DL — LOW (ref 1.6–2.6)
PHOSPHATE SERPL-MCNC: 3.9 MG/DL — SIGNIFICANT CHANGE UP (ref 2.5–4.5)
POTASSIUM SERPL-MCNC: 3.9 MMOL/L — SIGNIFICANT CHANGE UP (ref 3.5–5.3)
POTASSIUM SERPL-SCNC: 3.9 MMOL/L — SIGNIFICANT CHANGE UP (ref 3.5–5.3)
SODIUM SERPL-SCNC: 142 MMOL/L — SIGNIFICANT CHANGE UP (ref 135–145)

## 2017-07-30 RX ORDER — FUROSEMIDE 40 MG
1 TABLET ORAL
Qty: 0 | Refills: 0 | COMMUNITY

## 2017-07-30 RX ORDER — SENNA PLUS 8.6 MG/1
1 TABLET ORAL
Qty: 0 | Refills: 0 | COMMUNITY

## 2017-07-30 RX ORDER — LISINOPRIL 2.5 MG/1
1 TABLET ORAL
Qty: 30 | Refills: 0 | OUTPATIENT
Start: 2017-07-30 | End: 2017-08-29

## 2017-07-30 RX ORDER — HYDRALAZINE HCL 50 MG
1 TABLET ORAL
Qty: 0 | Refills: 0 | COMMUNITY

## 2017-07-30 RX ORDER — FUROSEMIDE 40 MG
1 TABLET ORAL
Qty: 30 | Refills: 0 | OUTPATIENT
Start: 2017-07-30 | End: 2017-08-29

## 2017-07-30 RX ORDER — CALCIUM GLUCONATE 100 MG/ML
1 VIAL (ML) INTRAVENOUS ONCE
Qty: 1 | Refills: 0 | Status: COMPLETED | OUTPATIENT
Start: 2017-07-30 | End: 2017-07-30

## 2017-07-30 RX ORDER — HYDRALAZINE HCL 50 MG
1 TABLET ORAL
Qty: 90 | Refills: 0 | OUTPATIENT
Start: 2017-07-30 | End: 2017-08-29

## 2017-07-30 RX ADMIN — Medication 100 MILLIGRAM(S): at 10:08

## 2017-07-30 RX ADMIN — Medication 50 MILLIGRAM(S): at 22:07

## 2017-07-30 RX ADMIN — Medication 200 GRAM(S): at 11:45

## 2017-07-30 RX ADMIN — Medication 50 MILLIGRAM(S): at 05:29

## 2017-07-30 RX ADMIN — RIVAROXABAN 15 MILLIGRAM(S): KIT at 18:37

## 2017-07-30 RX ADMIN — LISINOPRIL 5 MILLIGRAM(S): 2.5 TABLET ORAL at 05:31

## 2017-07-30 RX ADMIN — Medication 81 MILLIGRAM(S): at 10:08

## 2017-07-30 RX ADMIN — Medication 100 MILLIGRAM(S): at 05:29

## 2017-07-30 RX ADMIN — Medication 50 MILLIGRAM(S): at 13:04

## 2017-07-30 RX ADMIN — Medication 20 MILLIGRAM(S): at 05:29

## 2017-07-30 RX ADMIN — Medication 10 MILLIEQUIVALENT(S): at 10:08

## 2017-07-30 NOTE — DISCHARGE NOTE ADULT - HOSPITAL COURSE
To be done by Attending. 84female with PMHx of breast ca (dx'ed in July, s/p resection, was on chemo, now in remission as per patient), HTN, HLD, PAF, CAD s/p CABG, CHF s/p AICD, Mitral valve regurgitation s/p clipping p/w SOB.   SOB, CHF, +trop   trend cardiac enzymes  UA negative   her lasix was stoped few last week as she was c/o excessive urination, now develop SOB and leg edematart lasix 40 mg IV  -cardio consult  echo.   Type 2 diabetes mellitus.  Plan: c/w FSBS.   Hypertension.  Plan: c/w toprol XL and hydralazine.   PAF (paroxysmal atrial fibrillation).  Plan: rate controlled, pt. on xaralto at home , will continue , however unable to place order right now.   7/27  discussed elevated 2nd trop 0.28 with Dr. Pichardo, unable to reach Dr. Wynn; pt with clean cath March 2017, f/u ECHO, continue to trend enzymes  7/28: CE trending upward. Cards Dr Amaya aware. managing acute on chronic HF with diuresis but complicated by MYA. TTE unchanged from previous. Pending PT eval.  7/31:	Pt will be discharged to home this evening.  D/C home with services 84female with PMHx of breast ca (dx'ed in July, s/p resection, was on chemo, now in remission as per patient), HTN, HLD, PAF, CAD s/p CABG, CHF s/p AICD, Mitral valve regurgitation s/p clipping p/w SOB.   SOB, CHF, +trop   trend cardiac enzymes  UA negative   her lasix was stoped few last week as she was c/o excessive urination, now develop SOB and leg edematart lasix 40 mg IV  -cardio consult  echo.   Type 2 diabetes mellitus.  Plan: c/w FSBS.   Hypertension.  Plan: c/w toprol XL and hydralazine.   PAF (paroxysmal atrial fibrillation).  Plan: rate controlled, pt. on xaralto at home , will continue , however unable to place order right now.   7/27  discussed elevated 2nd trop 0.28 with Dr. Pichardo, unable to reach Dr. Wynn; pt with clean cath March 2017, f/u ECHO, continue to trend enzymes  7/28: CE trending upward. Cards Dr Amaya aware. managing acute on chronic HF with diuresis but complicated by MYA. TTE unchanged from previous. Pending PT eval.  7/31:	Pt will be discharged to home this evening.  D/C home with services.

## 2017-07-30 NOTE — DISCHARGE NOTE ADULT - MEDICATION SUMMARY - MEDICATIONS TO CHANGE
I will SWITCH the dose or number of times a day I take the medications listed below when I get home from the hospital:    hydrALAZINE 25 mg oral tablet  -- 1 tab(s) by mouth 2 times a day    Lasix 20 mg oral tablet  -- 1 tab(s) by mouth once a day on M/W/F

## 2017-07-30 NOTE — PROGRESS NOTE ADULT - SUBJECTIVE AND OBJECTIVE BOX
Patient is a 84y old  Female who presents with a chief complaint of SOB (27 Jul 2017 19:06)  Patient seen and examined, feels better.    Vital Signs Last 24 Hrs  T(C): 36.3 (30 Jul 2017 12:03), Max: 36.7 (30 Jul 2017 04:53)  T(F): 97.4 (30 Jul 2017 12:03), Max: 98 (30 Jul 2017 04:53)  HR: 66 (30 Jul 2017 12:03) (58 - 67)  BP: 130/86 (30 Jul 2017 15:28) (128/88 - 162/77)  BP(mean): --  RR: 18 (30 Jul 2017 12:03) (18 - 18)  SpO2: 95% (30 Jul 2017 12:03) (95% - 98%)        PAST MEDICAL & SURGICAL HISTORY:  Type 2 diabetes mellitus: Diet controlled, Hg A1C 6.1 ( 4/22/17)  Dyspnea: at rest  Non-rheumatic mitral regurgitation: Severe  Breast cancer: s/p resection, chemo x 3 treatments only, completed radiation  Hypertension  HLD (hyperlipidemia)  PAF (paroxysmal atrial fibrillation): diagnosed 7/2013 , on Aspirin only, no longer taking  Eliquis  CAD (coronary artery disease)  Cataract: b/l  Gout  HTN  GERD (gastroesophageal reflux disease)  S/P breast lumpectomy: left 7/20/2016  S/P cataract extraction: bilaterally-2014 withy artificial lenses  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 No nausea, vomiting, or hematemesis; No diarrhea or constipation. No melena or hematochezia.  GENITOURINARY: No dysuria, frequency, hematuria, or incontinence  NEUROLOGICAL: No headaches, memory loss, loss of strength, numbness, or tremors  SKIN: No itching, burning, rashes, or lesions   LYMPH NODES: No enlarged glands  ENDOCRINE: No heat or cold intolerance; No hair loss  MUSCULOSKELETAL: No joint pain or swelling; No muscle, back, or extremity pain  PSYCHIATRIC: No depression, anxiety, mood swings, or difficulty sleeping  HEME/LYMPH: No easy bruising, or bleeding gums  ALLERY AND IMMUNOLOGIC: No hives or eczema    RADIOLOGY & ADDITIONAL TESTS:    Imaging Personally Reviewed:  [ ] YES  [ ] NO    Consultant(s) Notes Reviewed:  [ ] YES  [ ] NO    PHYSICAL EXAM:  GENERAL: NAD, well-groomed, well-developed  HEAD:  Atraumatic, Normocephalic  EYES: EOMI, PERRLA, conjunctiva and sclera clear  ENMT: No tonsillar erythema, exudates, or enlargement; Moist mucous membranes, Good dentition, No lesions  NECK: Supple, No JVD, Normal thyroid  NERVOUS SYSTEM:  Alert & Oriented X3, Good concentration; Motor Strength 5/5 B/L upper and lower extremities; DTRs 2+ intact and symmetric  CHEST/LUNG: Clear to percussion bilaterally; No rales, rhonchi, wheezing, or rubs  HEART: Regular rate and rhythm; No murmurs, rubs, or gallops  ABDOMEN: Soft, Nontender, Nondistended; Bowel sounds present  EXTREMITIES:  2+ Peripheral Pulses, No clubbing, cyanosis, + edema ( decreased )  LYMPH: No lymphadenopathy noted  SKIN: No rashes or lesions    LABS:                                   11.3   5.3   )-----------( 127      ( 29 Jul 2017 06:21 )             34.3     07-30    142  |  104  |  36<H>  ----------------------------<  96  3.9   |  24  |  1.29    Ca    8.2<L>      30 Jul 2017 06:20  Phos  3.9     07-29  Mg     1.5     07-29        < from: TTE with Doppler (w/Cont) (07.28.17 @ 18:57) >  Patient name: ELY WOOTEN  YOB: 1933   Age: 84 (F)   MR#: [id number]  Study Date: 7/28/2017  [facility] Olga [facility]  Study quality: Technically difficult  Referring Physician: Morris Pichardo MD  Blood Pressure: 121/70 mmHg  Height: 163 cm  Weight: 66 kg  BSA: 1.7 m2  ------------------------------------------------------------------------  PROCEDURE: Transthoracic echocardiogram with 2-D, M-Mode  and complete spectral and color flow Doppler. Verbal  consent was obtained for injection of echo contrast  following a discussion of risks and benefits. Following  intravenous injection of contrast, harmonic imaging was  performed.  INDICATION: Abnormal electrocardiogram (ECG) (EKG) (R94.31)  ------------------------------------------------------------------------  Dimensions:    Normal Values:  LA:     5.6    2.0 - 4.0 cm  Ao:     2.8    2.0 - 3.8 cm  SEPTUM: 1.1    0.6 - 1.2 cm  PWT:    1.1    0.6 - 1.1 cm  LVIDd:  5.1    3.0 - 5.6 cm  LVIDs:  4.0    1.8 -4.0 cm  Derived variables:  LVMI: 125 g/m2  RWT: 0.43  Fractional short: 22 %  EF (Visual Estimate): 40 %  Doppler Peak Velocity (m/sec): AoV=1.7  ------------------------------------------------------------------------  Observations:  Mitral Valve: Mitraclip seen attached to both anterior and  posterior mitral valve leaflets. Moderate mitral  regurgitation. Mean transmitral valve gradient equals 6-7  mm Hg (at HR 70-80 bpm), which is elevated even in the  setting of a repaired valve.  Aortic Valve/Aorta: Calcified trileaflet aortic valve. Peak  transaortic valve gradient equals 12 mm Hg, mean  transaortic valve gradient equals 5 mm Hg, aortic valve  velocity time integral equals 37 cm. Peak left ventricular  outflow tract gradient equals 3 mm Hg, mean gradient is  equal to 1 mm Hg, LVOT velocity time integral equals 16 cm.  Aortic Root: 2.8 cm.  LVOT diameter: 1.7 cm.  Left Atrium: Severely dilated left atrium.  LA volume index  = 96 cc/m2.  Left Ventricle: Moderate segmental left ventricular  systolic dysfunction.  Endocardial visualization enhanced  with intravenous injection of echo contrast (Definity).  No  left ventricular thrombus. The apical segments are  akinetic. The inferoseptum and anteroseptum are  hypokinetic. Mild concentric left ventricular hypertrophy.  Right Heart: Right atrial enlargement. A device wire is  noted in the right heart.  Right ventricular enlargement  with decreased right ventricular systolic function.  Tethered tricuspid valve. Moderate tricuspid regurgitation.  Normal pulmonic valve. Minimal pulmonic regurgitation.  Pericardium/Pleura: No pericardial effusion seen.  Hemodynamic: Estimated right atrial pressure is 8 mm Hg.  Estimated right ventricular systolic pressure equals 46 mm  Hg, assuming right atrial pressure equals 8 mm Hg,  consistent with mild pulmonary hypertension.  ------------------------------------------------------------------------  Conclusions:  1. Mitraclip seen attached to both anterior and posterior  mitral valve leaflets. Moderate mitral regurgitation. Mean  transmitral valve gradient equals 6-7 mm Hg (at HR 70-80  bpm), which is elevated even in the setting of a repaired  valve.  2. Moderate segmental left ventricular systolic  dysfunction.  Endocardial visualization enhanced with  intravenous injection of echo contrast (Definity).  No left  ventricular thrombus. The apical segments are akinetic. The  inferoseptum and anteroseptum are hypokinetic.  3. A device wire is noted in the right heart.  Right  ventricular enlargement with decreased right ventricular  systolic function.  4. No pericardial effusion seen.  *** Compared with transthoracic echocardiogram of  5/17/2017, findings are similar.  ------------------------------------------------------------------------  Confirmed on  7/28/2017 - 13:28:03 by Krishna Rueda M.D.  ------------------------------------------------------------------------    < end of copied text >      MEDICATIONS  (STANDING):  aspirin enteric coated 81 milliGRAM(s) Oral daily  metoprolol succinate  milliGRAM(s) Oral daily  docusate sodium 100 milliGRAM(s) Oral daily  potassium chloride    Tablet ER 10 milliEquivalent(s) Oral daily  rivaroxaban 15 milliGRAM(s) Oral every 24 hours  furosemide    Tablet 20 milliGRAM(s) Oral daily  hydrALAZINE 50 milliGRAM(s) Oral three times a day    MEDICATIONS  (PRN):      Care Discussed with Consultants/Other Providers [ ] YES  [ ] NO

## 2017-07-30 NOTE — DISCHARGE NOTE ADULT - CARE PROVIDER_API CALL
Nurse, Olivia GARDINER), Internal Medicine  [address] [place] [address]  Phone: [phone]  Fax: [phone]

## 2017-07-30 NOTE — DISCHARGE NOTE ADULT - PATIENT PORTAL LINK FT
“You can access the Follow[employer] Patient Portal, offered by [facility], by registering with the following website: [email or web address]”

## 2017-07-30 NOTE — PROGRESS NOTE ADULT - SUBJECTIVE AND OBJECTIVE BOX
INTERVAL HISTORY: feels well    TELEMETRY:  	  MEDICATIONS:  metoprolol succinate  milliGRAM(s) Oral daily  furosemide    Tablet 20 milliGRAM(s) Oral daily  hydrALAZINE 50 milliGRAM(s) Oral three times a day  lisinopril 5 milliGRAM(s) Oral daily        PHYSICAL EXAM:  T(C): 36.6 (07-30-17 @ 21:25), Max: 36.7 (07-30-17 @ 04:53)  HR: 67 (07-30-17 @ 21:25) (58 - 67)  BP: 156/79 (07-30-17 @ 21:25) (128/88 - 162/77)  RR: 17 (07-30-17 @ 21:25) (17 - 18)  SpO2: 95% (07-30-17 @ 21:25) (95% - 95%)  Wt(kg): --  I&O's Summary    29 Jul 2017 07:01  -  30 Jul 2017 07:00  --------------------------------------------------------  IN: 700 mL / OUT: 0 mL / NET: 700 mL    30 Jul 2017 07:01  -  30 Jul 2017 22:28  --------------------------------------------------------  IN: 660 mL / OUT: 0 mL / NET: 660 mL          Appearance: Normal	  HEENT:    PERRL, EOMI	  Lymphatic: No lymphadenopathy  Cardiovascular: Normal S1 S2, No JVD  Respiratory: Lungs clear to auscultation	  Psychiatry: Alert, Mood & affect appropriate  Gastrointestinal:  Soft, Non-tender, + BS	  Skin: No rashes, No cyanosis  Neurologic: Non-focal  Extremities:  No  cyanosis or edema  Vascular: Peripheral pulses palpable  bilaterally      CARDIAC MARKERS:                          11.3   5.3   )-----------( 127      ( 29 Jul 2017 06:21 )             34.3     07-30    142  |  104  |  36<H>  ----------------------------<  96  3.9   |  24  |  1.29    Ca    8.2<L>      30 Jul 2017 06:20  Phos  3.9     07-29  Mg     1.5     07-29      proBNP:   Lipid Profile:   HgA1c:   TSH:     ASSESSMENT/PLAN: 	  83 y/o F with PMx of breast ca (dx'ed in July, s/p resection, was on chemo, now in remission as per patient), HTN, HLD, PAF(on Xarelto) severe MR s/p Mitraclip 5/2017 s/p EPS inducible VT s/p AICD implant p/w SOB. The patient reports that she noticed worsening SOB and LE edema in setting of stopping Lasix last week  1. Acute on chronic decompensated systolic HF with MR -   -  Hydral  50mg TID  - Lisinopril 5mg daily  -  Lasix 20mg PO daily  - Continue with metoprolol    2. AF - overall well controlled.   - c/w Metoprolol and Xarelto    3. HTN - Increase to Hydral 50 mg TID  - Lisinopril 5mg PO qd    4. MR s/p clip - Afterload reduction with Hydral  - on ASA    5. HLD - resume Zetia 10mg    6. Discharge planning        Patel Amaya DO [facility]  Cardiovascular Medicine  [phone]

## 2017-07-30 NOTE — DISCHARGE NOTE ADULT - CARE PLAN
Principal Discharge DX:	CHF (congestive heart failure)  Goal:	Heart failure will be stable.  Instructions for follow-up, activity and diet:	Weigh yourself daily.  If you gain 3lbs in 3 days, or 5lbs in a week call your Health Care Provider.  Do not eat or drink foods containing more than 2000mg of salt (sodium) in your diet every day.  Call your Health Care Provider if you have any swelling or increased swelling in your feet, ankles, and/or stomach.  Take all of your medication as directed.  If you become dizzy call your Health Care Provider.  Secondary Diagnosis:	MYA (acute kidney injury)  Goal:	MYA will continue to be stable.  Instructions for follow-up, activity and diet:	1. Continue present medication regimen.  2. Follow up with PMD in 1 week.  Secondary Diagnosis:	PAF (paroxysmal atrial fibrillation)  Goal:	PAF will continue to be rate controlled.  Instructions for follow-up, activity and diet:	Atrial fibrillation is the most common heart rhythm problem.  The condition puts you at risk for has stroke and heart attack  It helps if you control your blood pressure, not drink more than 1-2 alcohol drinks per day, cut down on caffeine, getting treatment for over active thyroid gland, and get regular exercise  Call your doctor if you feel your heart racing or beating unusually, chest tightness or pain, lightheaded, faint, shortness of breath especially with exercise  It is important to take your heart medication as prescribed  You may be on anticoagulation which is very important to take as directed - you may need blood work to monitor drug levels

## 2017-07-30 NOTE — DISCHARGE NOTE ADULT - PLAN OF CARE
Heart failure will be stable. Weigh yourself daily.  If you gain 3lbs in 3 days, or 5lbs in a week call your Health Care Provider.  Do not eat or drink foods containing more than 2000mg of salt (sodium) in your diet every day.  Call your Health Care Provider if you have any swelling or increased swelling in your feet, ankles, and/or stomach.  Take all of your medication as directed.  If you become dizzy call your Health Care Provider. MYA will continue to be stable. 1. Continue present medication regimen.  2. Follow up with PMD in 1 week. PAF will continue to be rate controlled. Atrial fibrillation is the most common heart rhythm problem.  The condition puts you at risk for has stroke and heart attack  It helps if you control your blood pressure, not drink more than 1-2 alcohol drinks per day, cut down on caffeine, getting treatment for over active thyroid gland, and get regular exercise  Call your doctor if you feel your heart racing or beating unusually, chest tightness or pain, lightheaded, faint, shortness of breath especially with exercise  It is important to take your heart medication as prescribed  You may be on anticoagulation which is very important to take as directed - you may need blood work to monitor drug levels

## 2017-07-30 NOTE — DISCHARGE NOTE ADULT - NS AS DC HF EDUCATION INSTRUCTIONS
Activities as tolerated/Monitor Weight Daily/Report weight gain of 2 or more pounds in one day or 3 or more pounds in one week, worsening shortness of breath, fatigue, weakness, increased swelling of hands and feet to primary care provider/Low salt diet/Call Primary Care Provider for follow-up after discharge

## 2017-07-30 NOTE — PROVIDER CONTACT NOTE (MEDICATION) - ASSESSMENT
patient is alert and oriented x3,denies chest pain  and shortness of [id number]/77-98% pulse ox. HR of 60

## 2017-07-30 NOTE — PHYSICAL THERAPY INITIAL EVALUATION ADULT - PERTINENT HX OF CURRENT PROBLEM, REHAB EVAL
84F with PMHx of breast ca (dx'ed in July, s/p resection, was on chemo, now in remission as per patient), HTN, HLD, PAF on Eliquis, CAD s/p CABG, CHF s/p AICD, Mitral valve regurgitation s/p clipping p/w SOB.  The patient reports that she noticed worsening SOB since last night.

## 2017-07-30 NOTE — DISCHARGE NOTE ADULT - MEDICATION SUMMARY - MEDICATIONS TO TAKE
I will START or STAY ON the medications listed below when I get home from the hospital:    Aspirin Enteric Coated 81 mg oral delayed release tablet  -- 1 tab(s) by mouth once a day  -- Indication: For CAD    lisinopril 5 mg oral tablet  -- 1 tab(s) by mouth once a day MDD:1  -- Indication: For CHF (congestive heart failure)    Xarelto 15 mg oral tablet  -- 1 tab(s) by mouth once a day (in the evening)  -- Indication: For PAF (paroxysmal atrial fibrillation)    Zetia 10 mg oral tablet  -- 1 tab(s) by mouth once a day (at bedtime)  -- Indication: For CAD    metoprolol succinate 100 mg oral tablet, extended release  -- 1 tab(s) by mouth once a day  -- Indication: For CHF (congestive heart failure)    furosemide 20 mg oral tablet  -- 1 tab(s) by mouth once a day MDD:1  -- Indication: For CHF (congestive heart failure)    senna oral tablet  -- 1 tab(s) by mouth once a day, As Needed.   -- Indication: For Constipation     Colace 100 mg oral capsule  -- 1 cap(s) by mouth once a day  -- Indication: For Stool Softener    potassium chloride 10 mEq oral tablet, extended release  -- 1 tab(s) by mouth once a day with Lasix (M/W/F)   -- Indication: For Supplement     Omega-3 oral capsule  -- 1 cap(s) by mouth once a day  -- Indication: For Supplement    pantoprazole 40 mg oral delayed release tablet  -- 1 tab(s) by mouth once a day  -- Indication: For GERDS    hydrALAZINE 50 mg oral tablet  -- 1 tab(s) by mouth 3 times a day MDD:3  -- Indication: For Hypertension    Centrum Women's  -- 1 tab(s) by mouth once a day  -- Indication: For Supplement

## 2017-07-31 VITALS
OXYGEN SATURATION: 96 % | HEART RATE: 68 BPM | SYSTOLIC BLOOD PRESSURE: 138 MMHG | DIASTOLIC BLOOD PRESSURE: 75 MMHG | RESPIRATION RATE: 16 BRPM | TEMPERATURE: 98 F

## 2017-07-31 PROCEDURE — 85027 COMPLETE CBC AUTOMATED: CPT

## 2017-07-31 PROCEDURE — 99285 EMERGENCY DEPT VISIT HI MDM: CPT | Mod: 25

## 2017-07-31 PROCEDURE — 96374 THER/PROPH/DIAG INJ IV PUSH: CPT

## 2017-07-31 PROCEDURE — 84295 ASSAY OF SERUM SODIUM: CPT

## 2017-07-31 PROCEDURE — 82803 BLOOD GASES ANY COMBINATION: CPT

## 2017-07-31 PROCEDURE — 82435 ASSAY OF BLOOD CHLORIDE: CPT

## 2017-07-31 PROCEDURE — 83735 ASSAY OF MAGNESIUM: CPT

## 2017-07-31 PROCEDURE — 85610 PROTHROMBIN TIME: CPT

## 2017-07-31 PROCEDURE — 84132 ASSAY OF SERUM POTASSIUM: CPT

## 2017-07-31 PROCEDURE — 82553 CREATINE MB FRACTION: CPT

## 2017-07-31 PROCEDURE — 82330 ASSAY OF CALCIUM: CPT

## 2017-07-31 PROCEDURE — 82962 GLUCOSE BLOOD TEST: CPT

## 2017-07-31 PROCEDURE — 83880 ASSAY OF NATRIURETIC PEPTIDE: CPT

## 2017-07-31 PROCEDURE — 87086 URINE CULTURE/COLONY COUNT: CPT

## 2017-07-31 PROCEDURE — 83605 ASSAY OF LACTIC ACID: CPT

## 2017-07-31 PROCEDURE — 81001 URINALYSIS AUTO W/SCOPE: CPT

## 2017-07-31 PROCEDURE — 97161 PT EVAL LOW COMPLEX 20 MIN: CPT

## 2017-07-31 PROCEDURE — 84484 ASSAY OF TROPONIN QUANT: CPT

## 2017-07-31 PROCEDURE — 71045 X-RAY EXAM CHEST 1 VIEW: CPT

## 2017-07-31 PROCEDURE — 84100 ASSAY OF PHOSPHORUS: CPT

## 2017-07-31 PROCEDURE — 80053 COMPREHEN METABOLIC PANEL: CPT

## 2017-07-31 PROCEDURE — 93005 ELECTROCARDIOGRAM TRACING: CPT

## 2017-07-31 PROCEDURE — 85730 THROMBOPLASTIN TIME PARTIAL: CPT

## 2017-07-31 PROCEDURE — C8929: CPT

## 2017-07-31 PROCEDURE — 82550 ASSAY OF CK (CPK): CPT

## 2017-07-31 PROCEDURE — 80048 BASIC METABOLIC PNL TOTAL CA: CPT

## 2017-07-31 PROCEDURE — 82947 ASSAY GLUCOSE BLOOD QUANT: CPT

## 2017-07-31 PROCEDURE — 85014 HEMATOCRIT: CPT

## 2017-07-31 PROCEDURE — 87186 SC STD MICRODIL/AGAR DIL: CPT

## 2017-07-31 RX ADMIN — Medication 10 MILLIEQUIVALENT(S): at 12:23

## 2017-07-31 RX ADMIN — Medication 100 MILLIGRAM(S): at 04:33

## 2017-07-31 RX ADMIN — Medication 20 MILLIGRAM(S): at 04:33

## 2017-07-31 RX ADMIN — Medication 100 MILLIGRAM(S): at 12:23

## 2017-07-31 RX ADMIN — LISINOPRIL 5 MILLIGRAM(S): 2.5 TABLET ORAL at 04:33

## 2017-07-31 RX ADMIN — Medication 50 MILLIGRAM(S): at 13:50

## 2017-07-31 RX ADMIN — RIVAROXABAN 15 MILLIGRAM(S): KIT at 17:49

## 2017-07-31 RX ADMIN — Medication 50 MILLIGRAM(S): at 04:33

## 2017-07-31 RX ADMIN — Medication 81 MILLIGRAM(S): at 12:23

## 2017-07-31 NOTE — H&P CARDIOLOGY - SOURCE
CLINICAL NUTRITION SERVICES - REASSESSMENT NOTE      Future/Additional Recommendations: Will continue to monitor Phos levels and need to switch to a renal TF formula (ie. Nepro)       EVALUATION OF PROGRESS TOWARD GOALS   Diet:  NPO, SLP eval pending today   Nutrition Support:  TF was resumed 7/29 post surgery right at goal and continues as follows ~    Nutrition Support Enteral:  Type of Feeding Tube: Nasoduodenal FT  Enteral Frequency:  Continuous  Enteral Regimen: Fibersource HN at 50 mL/hr   Total Enteral Provisions: 1440 kcal, 65 g protein, 972 mL H2O, 18 g fiber, 197 g CHO   Free Water Flush: 60 mL every 4 hours   Patient is also receiving ProSource 1 pkt TID= 120 kcal, 33 g protein  Total = 1560 kcal (23 kcal per kg or 93% energy needs), 98 g protein (1.5 g per kg)    Intake/Tolerance:    Na 131 (L) - Noted flushes decreased today (per NP)  Phos 4.9 (H), BUN 48 (H) - H/O CKD, Mg/K normal   BGM 94  Stool 100 mL - Colace on hold     Dosing Weight: 67 kg (admit wt)     ASSESSED NUTRITION NEEDS PER APPROVED PRACTICE GUIDELINES:  Estimated Energy Needs:  5281-0105 kcals (25-30 Kcal/Kg)  Justification: maintenance  Estimated Protein Needs:   grams protein (1.1-1.5 g pro/Kg)  Justification: Repletion, hypercatabolism with acute illness and CKD      NEW FINDINGS:   Patient is s/p VATS on 7/28  Plan for SLP eval today     Previous Goals (7/27):   TF goal Fibersource HN at 50 mL/hr + 3 packets ProSource will meet % estimated needs in 48-72 hrs  Evaluation: Met    Previous Nutrition Diagnosis (7/27):   Inadequate enteral nutrition infusion related to TF on hold with transfer as evidenced by meeting 0% estimated needs  Evaluation: Resolved     CURRENT NUTRITION DIAGNOSIS  No nutrition diagnosis identified at this time     INTERVENTIONS  Recommendations / Nutrition Prescription  Continue Fibersource HN at 50 mL/hr + ProSource 1 pkt TID as above     Will continue to monitor Phos levels and need to switch to a  renal TF formula (ie. Nepro)    Implementation  Collaboration and Referral of Nutrition care:  Patient discussed today during interdisciplinary bedside rounds     Goals  Fibersource HN at 50 mL/hr + ProSource TID will continue to meet % needs  Phos level will improve    MONITORING AND EVALUATION:  Progress towards goals will be monitored and evaluated per protocol and Practice Guidelines    Bisi Brumfield RD, LD, CNSC   Clinical Dietitian - [facility]            Other/son/Patient

## 2017-07-31 NOTE — CHART NOTE - NSCHARTNOTEFT_GEN_A_CORE
Patient is a 84y old  Female who presents with a chief complaint of SOB (30 Jul 2017 14:32)  Pt is hemodynamically stable.  Pt's home care agency has been contacted to reinstate her home care services.      Vital Signs Last 24 Hrs  T(C): 36.6 (31 Jul 2017 12:01), Max: 36.6 (30 Jul 2017 21:25)  T(F): 97.8 (31 Jul 2017 12:01), Max: 97.9 (30 Jul 2017 21:25)  HR: 71 (31 Jul 2017 13:49) (59 - 71)  BP: 148/79 (31 Jul 2017 13:49) (126/63 - 156/79)  BP(mean): --  RR: 17 (31 Jul 2017 12:01) (17 - 18)  SpO2: 97% (31 Jul 2017 12:01) (95% - 97%)      Labs:      07-30    142  |  104  |  36<H>  ----------------------------<  96  3.9   |  24  |  1.29    Ca    8.2<L>      30 Jul 2017 06:20      Physical Exam:  General: WN/WD NAD  Neurology: A&Ox2, nonfocal, PIZARRO x 4  Head:  Normocephalic, atraumatic  Respiratory: CTA B/L  CV: RRR, S1S2, no murmur  Abdominal: Soft, NT, ND no palpable mass      Discharge Note and Plan:  >Follow up with Dr. PRABHA Nurse in 1 week.   >Continue present medication regimen.   >Pt to be discharged to home tonight with her son.     Nini Gleason ANP-BC  Spectralink #[id number]

## 2017-07-31 NOTE — PROGRESS NOTE ADULT - SUBJECTIVE AND OBJECTIVE BOX
INTERVAL HISTORY: feels well    TELEMETRY: no events, af  	  MEDICATIONS:  metoprolol succinate  milliGRAM(s) Oral daily  furosemide    Tablet 20 milliGRAM(s) Oral daily  hydrALAZINE 50 milliGRAM(s) Oral three times a day  lisinopril 5 milliGRAM(s) Oral daily        PHYSICAL EXAM:  T(C): 36.6 (07-31-17 @ 12:01), Max: 36.6 (07-30-17 @ 21:25)  HR: 59 (07-31-17 @ 12:01) (59 - 67)  BP: 146/87 (07-31-17 @ 12:01) (126/63 - 156/79)  RR: 17 (07-31-17 @ 12:01) (17 - 18)  SpO2: 97% (07-31-17 @ 12:01) (95% - 97%)  Wt(kg): --  I&O's Summary    30 Jul 2017 07:01  -  31 Jul 2017 07:00  --------------------------------------------------------  IN: 660 mL / OUT: 0 mL / NET: 660 mL    31 Jul 2017 07:01  -  31 Jul 2017 12:32  --------------------------------------------------------  IN: 240 mL / OUT: 0 mL / NET: 240 mL          Appearance: Normal	  HEENT:    PERRL, EOMI	  Lymphatic: No lymphadenopathy  Cardiovascular: Normal S1 S2, No JVD  Respiratory: Lungs clear to auscultation	  Psychiatry: Alert, Mood & affect appropriate  Gastrointestinal:  Soft, Non-tender, + BS	  Skin: No rashes, No cyanosis  Neurologic: Non-focal  Extremities:  No  cyanosis or edema  Vascular: Peripheral pulses palpable  bilaterally      CARDIAC MARKERS:              07-30    142  |  104  |  36<H>  ----------------------------<  96  3.9   |  24  |  1.29    Ca    8.2<L>      30 Jul 2017 06:20           ASSESSMENT/PLAN: 	  85 y/o F with PMx of breast ca (dx'ed in July, s/p resection, was on chemo, now in remission as per patient), HTN, HLD, PAF(on Xarelto) severe MR s/p Mitraclip 5/2017 s/p EPS inducible VT s/p AICD implant p/w SOB. The patient reports that she noticed worsening SOB and LE edema in setting of stopping Lasix last week  1. Acute on chronic decompensated systolic HF with MR -   -  Hydral  50mg TID  - Lisinopril 5mg daily  -  Lasix 20mg PO daily  - Continue with metoprolol    2. AF - overall well controlled.   - c/w Metoprolol and Xarelto    3. HTN - Increase to Hydral 50 mg TID  - Lisinopril 5mg PO qd    4. MR s/p clip - Afterload reduction with Hydral  - on ASA    5. HLD - resume Zetia 10mg    6. Discharge planning        Patel Amaya DO [facility]  Cardiovascular Medicine  [phone]

## 2017-08-08 ENCOUNTER — INPATIENT (INPATIENT)
Facility: HOSPITAL | Age: 82
LOS: 12 days | Discharge: HOME | DRG: 987 | End: 2017-08-21
Attending: TRANSPLANT SURGERY | Admitting: SURGERY
Payer: COMMERCIAL

## 2017-08-08 VITALS — DIASTOLIC BLOOD PRESSURE: 70 MMHG | HEART RATE: 126 BPM | OXYGEN SATURATION: 100 % | SYSTOLIC BLOOD PRESSURE: 107 MMHG

## 2017-08-08 DIAGNOSIS — K66.1 HEMOPERITONEUM: ICD-10-CM

## 2017-08-08 DIAGNOSIS — I34.0 NONRHEUMATIC MITRAL (VALVE) INSUFFICIENCY: Chronic | ICD-10-CM

## 2017-08-08 DIAGNOSIS — Z95.0 PRESENCE OF CARDIAC PACEMAKER: Chronic | ICD-10-CM

## 2017-08-08 DIAGNOSIS — Z98.890 OTHER SPECIFIED POSTPROCEDURAL STATES: Chronic | ICD-10-CM

## 2017-08-08 DIAGNOSIS — Z98.49 CATARACT EXTRACTION STATUS, UNSPECIFIED EYE: Chronic | ICD-10-CM

## 2017-08-08 LAB
ALBUMIN SERPL ELPH-MCNC: 3.2 G/DL — LOW (ref 3.3–5)
ALBUMIN SERPL ELPH-MCNC: 3.6 G/DL — SIGNIFICANT CHANGE UP (ref 3.3–5)
ALP SERPL-CCNC: 71 U/L — SIGNIFICANT CHANGE UP (ref 40–120)
ALP SERPL-CCNC: 71 U/L — SIGNIFICANT CHANGE UP (ref 40–120)
ALT FLD-CCNC: 17 U/L RC — SIGNIFICANT CHANGE UP (ref 10–45)
ALT FLD-CCNC: 25 U/L RC — SIGNIFICANT CHANGE UP (ref 10–45)
ANION GAP SERPL CALC-SCNC: 17 MMOL/L — SIGNIFICANT CHANGE UP (ref 5–17)
ANION GAP SERPL CALC-SCNC: 19 MMOL/L — HIGH (ref 5–17)
APTT BLD: 33.2 SEC — SIGNIFICANT CHANGE UP (ref 27.5–37.4)
APTT BLD: 34.7 SEC — SIGNIFICANT CHANGE UP (ref 27.5–37.4)
APTT BLD: 34.7 SEC — SIGNIFICANT CHANGE UP (ref 27.5–37.4)
APTT BLD: 37.1 SEC — SIGNIFICANT CHANGE UP (ref 27.5–37.4)
APTT BLD: 40.3 SEC — HIGH (ref 27.5–37.4)
APTT BLD: 40.8 SEC — HIGH (ref 27.5–37.4)
AST SERPL-CCNC: 27 U/L — SIGNIFICANT CHANGE UP (ref 10–40)
AST SERPL-CCNC: 38 U/L — SIGNIFICANT CHANGE UP (ref 10–40)
BASE EXCESS BLDV CALC-SCNC: 2.1 MMOL/L — HIGH (ref -2–2)
BASE EXCESS BLDV CALC-SCNC: 2.5 MMOL/L — HIGH (ref -2–2)
BASOPHILS # BLD AUTO: 0 K/UL — SIGNIFICANT CHANGE UP (ref 0–0.2)
BASOPHILS # BLD AUTO: 0.1 K/UL — SIGNIFICANT CHANGE UP (ref 0–0.2)
BASOPHILS NFR BLD AUTO: 0 % — SIGNIFICANT CHANGE UP (ref 0–2)
BASOPHILS NFR BLD AUTO: 0.4 % — SIGNIFICANT CHANGE UP (ref 0–2)
BILIRUB DIRECT SERPL-MCNC: 2.1 MG/DL — HIGH (ref 0–0.2)
BILIRUB INDIRECT FLD-MCNC: 3.2 MG/DL — HIGH (ref 0.2–1)
BILIRUB SERPL-MCNC: 1.7 MG/DL — HIGH (ref 0.2–1.2)
BILIRUB SERPL-MCNC: 5.3 MG/DL — HIGH (ref 0.2–1.2)
BLD GP AB SCN SERPL QL: NEGATIVE — SIGNIFICANT CHANGE UP
BUN SERPL-MCNC: 23 MG/DL — SIGNIFICANT CHANGE UP (ref 7–23)
BUN SERPL-MCNC: 25 MG/DL — HIGH (ref 7–23)
CA-I SERPL-SCNC: 1.09 MMOL/L — LOW (ref 1.12–1.3)
CA-I SERPL-SCNC: 1.09 MMOL/L — LOW (ref 1.12–1.3)
CALCIUM SERPL-MCNC: 8.4 MG/DL — SIGNIFICANT CHANGE UP (ref 8.4–10.5)
CALCIUM SERPL-MCNC: 9.4 MG/DL — SIGNIFICANT CHANGE UP (ref 8.4–10.5)
CHLORIDE BLDV-SCNC: 100 MMOL/L — SIGNIFICANT CHANGE UP (ref 96–108)
CHLORIDE BLDV-SCNC: 99 MMOL/L — SIGNIFICANT CHANGE UP (ref 96–108)
CHLORIDE SERPL-SCNC: 103 MMOL/L — SIGNIFICANT CHANGE UP (ref 96–108)
CHLORIDE SERPL-SCNC: 98 MMOL/L — SIGNIFICANT CHANGE UP (ref 96–108)
CK MB BLD-MCNC: 4.9 % — HIGH (ref 0–3.5)
CK MB CFR SERPL CALC: 1.7 NG/ML — SIGNIFICANT CHANGE UP (ref 0–3.8)
CK SERPL-CCNC: 35 U/L — SIGNIFICANT CHANGE UP (ref 25–170)
CO2 BLDV-SCNC: 27 MMOL/L — SIGNIFICANT CHANGE UP (ref 22–30)
CO2 BLDV-SCNC: 28 MMOL/L — SIGNIFICANT CHANGE UP (ref 22–30)
CO2 SERPL-SCNC: 19 MMOL/L — LOW (ref 22–31)
CO2 SERPL-SCNC: 23 MMOL/L — SIGNIFICANT CHANGE UP (ref 22–31)
CREAT SERPL-MCNC: 1.09 MG/DL — SIGNIFICANT CHANGE UP (ref 0.5–1.3)
CREAT SERPL-MCNC: 1.49 MG/DL — HIGH (ref 0.5–1.3)
EOSINOPHIL # BLD AUTO: 0 K/UL — SIGNIFICANT CHANGE UP (ref 0–0.5)
EOSINOPHIL # BLD AUTO: 0 K/UL — SIGNIFICANT CHANGE UP (ref 0–0.5)
EOSINOPHIL NFR BLD AUTO: 0 % — SIGNIFICANT CHANGE UP (ref 0–6)
EOSINOPHIL NFR BLD AUTO: 0.1 % — SIGNIFICANT CHANGE UP (ref 0–6)
FIBRINOGEN PPP-MCNC: 503 MG/DL — SIGNIFICANT CHANGE UP (ref 310–510)
GAS PNL BLDA: SIGNIFICANT CHANGE UP
GAS PNL BLDV: 134 MMOL/L — LOW (ref 136–145)
GAS PNL BLDV: 134 MMOL/L — LOW (ref 136–145)
GAS PNL BLDV: SIGNIFICANT CHANGE UP
GLUCOSE BLDV-MCNC: 108 MG/DL — HIGH (ref 70–99)
GLUCOSE BLDV-MCNC: 135 MG/DL — HIGH (ref 70–99)
GLUCOSE SERPL-MCNC: 134 MG/DL — HIGH (ref 70–99)
GLUCOSE SERPL-MCNC: 151 MG/DL — HIGH (ref 70–99)
HCO3 BLDV-SCNC: 26 MMOL/L — SIGNIFICANT CHANGE UP (ref 21–29)
HCO3 BLDV-SCNC: 27 MMOL/L — SIGNIFICANT CHANGE UP (ref 21–29)
HCT VFR BLD CALC: 27.2 % — LOW (ref 34.5–45)
HCT VFR BLD CALC: 31 % — LOW (ref 34.5–45)
HCT VFR BLD CALC: 31.1 % — LOW (ref 34.5–45)
HCT VFR BLD CALC: 31.2 % — LOW (ref 34.5–45)
HCT VFR BLD CALC: 32.9 % — LOW (ref 34.5–45)
HCT VFR BLDA CALC: 24 % — LOW (ref 39–50)
HCT VFR BLDA CALC: 26 % — LOW (ref 39–50)
HGB BLD CALC-MCNC: 7.8 G/DL — LOW (ref 11.5–15.5)
HGB BLD CALC-MCNC: 8.4 G/DL — LOW (ref 11.5–15.5)
HGB BLD-MCNC: 10.1 G/DL — LOW (ref 11.5–15.5)
HGB BLD-MCNC: 10.7 G/DL — LOW (ref 11.5–15.5)
HGB BLD-MCNC: 10.9 G/DL — LOW (ref 11.5–15.5)
HGB BLD-MCNC: 11.2 G/DL — LOW (ref 11.5–15.5)
HGB BLD-MCNC: 9 G/DL — LOW (ref 11.5–15.5)
INR BLD: 2.56 RATIO — HIGH (ref 0.88–1.16)
INR BLD: 2.58 RATIO — HIGH (ref 0.88–1.16)
INR BLD: 2.59 RATIO — HIGH (ref 0.88–1.16)
INR BLD: 2.83 RATIO — HIGH (ref 0.88–1.16)
INR BLD: 2.94 RATIO — HIGH (ref 0.88–1.16)
INR BLD: 4.1 RATIO — HIGH (ref 0.88–1.16)
INR BLD: 5.4 RATIO — CRITICAL HIGH (ref 0.88–1.16)
LACTATE BLDV-MCNC: 2.9 MMOL/L — HIGH (ref 0.7–2)
LACTATE BLDV-MCNC: 3.1 MMOL/L — HIGH (ref 0.7–2)
LIDOCAIN IGE QN: 14 U/L — SIGNIFICANT CHANGE UP (ref 7–60)
LYMPHOCYTES # BLD AUTO: 0.4 K/UL — LOW (ref 1–3.3)
LYMPHOCYTES # BLD AUTO: 0.7 K/UL — LOW (ref 1–3.3)
LYMPHOCYTES # BLD AUTO: 3.1 % — LOW (ref 13–44)
LYMPHOCYTES # BLD AUTO: 5.1 % — LOW (ref 13–44)
MAGNESIUM SERPL-MCNC: 1.8 MG/DL — SIGNIFICANT CHANGE UP (ref 1.6–2.6)
MAGNESIUM SERPL-MCNC: 1.9 MG/DL — SIGNIFICANT CHANGE UP (ref 1.6–2.6)
MCHC RBC-ENTMCNC: 28.6 PG — SIGNIFICANT CHANGE UP (ref 27–34)
MCHC RBC-ENTMCNC: 29 PG — SIGNIFICANT CHANGE UP (ref 27–34)
MCHC RBC-ENTMCNC: 29.8 PG — SIGNIFICANT CHANGE UP (ref 27–34)
MCHC RBC-ENTMCNC: 30.1 PG — SIGNIFICANT CHANGE UP (ref 27–34)
MCHC RBC-ENTMCNC: 30.9 PG — SIGNIFICANT CHANGE UP (ref 27–34)
MCHC RBC-ENTMCNC: 32.4 GM/DL — SIGNIFICANT CHANGE UP (ref 32–36)
MCHC RBC-ENTMCNC: 33 GM/DL — SIGNIFICANT CHANGE UP (ref 32–36)
MCHC RBC-ENTMCNC: 34 GM/DL — SIGNIFICANT CHANGE UP (ref 32–36)
MCHC RBC-ENTMCNC: 34.3 GM/DL — SIGNIFICANT CHANGE UP (ref 32–36)
MCHC RBC-ENTMCNC: 35.2 GM/DL — SIGNIFICANT CHANGE UP (ref 32–36)
MCV RBC AUTO: 87.5 FL — SIGNIFICANT CHANGE UP (ref 80–100)
MCV RBC AUTO: 87.7 FL — SIGNIFICANT CHANGE UP (ref 80–100)
MCV RBC AUTO: 88.3 FL — SIGNIFICANT CHANGE UP (ref 80–100)
MONOCYTES # BLD AUTO: 0.9 K/UL — SIGNIFICANT CHANGE UP (ref 0–0.9)
MONOCYTES # BLD AUTO: 0.9 K/UL — SIGNIFICANT CHANGE UP (ref 0–0.9)
MONOCYTES NFR BLD AUTO: 6.6 % — SIGNIFICANT CHANGE UP (ref 2–14)
MONOCYTES NFR BLD AUTO: 7 % — SIGNIFICANT CHANGE UP (ref 2–14)
NEUTROPHILS # BLD AUTO: 11.7 K/UL — HIGH (ref 1.8–7.4)
NEUTROPHILS # BLD AUTO: 11.7 K/UL — HIGH (ref 1.8–7.4)
NEUTROPHILS NFR BLD AUTO: 87.9 % — HIGH (ref 43–77)
NEUTROPHILS NFR BLD AUTO: 89.8 % — HIGH (ref 43–77)
NT-PROBNP SERPL-SCNC: HIGH PG/ML (ref 0–300)
PCO2 BLDV: 37 MMHG — SIGNIFICANT CHANGE UP (ref 35–50)
PCO2 BLDV: 44 MMHG — SIGNIFICANT CHANGE UP (ref 35–50)
PH BLDV: 7.41 — SIGNIFICANT CHANGE UP (ref 7.35–7.45)
PH BLDV: 7.45 — SIGNIFICANT CHANGE UP (ref 7.35–7.45)
PHOSPHATE SERPL-MCNC: 3.5 MG/DL — SIGNIFICANT CHANGE UP (ref 2.5–4.5)
PHOSPHATE SERPL-MCNC: 4.5 MG/DL — SIGNIFICANT CHANGE UP (ref 2.5–4.5)
PLATELET # BLD AUTO: 101 K/UL — LOW (ref 150–400)
PLATELET # BLD AUTO: 142 K/UL — LOW (ref 150–400)
PLATELET # BLD AUTO: 155 K/UL — SIGNIFICANT CHANGE UP (ref 150–400)
PLATELET # BLD AUTO: 174 K/UL — SIGNIFICANT CHANGE UP (ref 150–400)
PLATELET # BLD AUTO: 203 K/UL — SIGNIFICANT CHANGE UP (ref 150–400)
PO2 BLDV: 21 MMHG — LOW (ref 25–45)
PO2 BLDV: 33 MMHG — SIGNIFICANT CHANGE UP (ref 25–45)
POTASSIUM BLDV-SCNC: 3.7 MMOL/L — SIGNIFICANT CHANGE UP (ref 3.5–5)
POTASSIUM BLDV-SCNC: 3.8 MMOL/L — SIGNIFICANT CHANGE UP (ref 3.5–5)
POTASSIUM SERPL-MCNC: 3.7 MMOL/L — SIGNIFICANT CHANGE UP (ref 3.5–5.3)
POTASSIUM SERPL-MCNC: 3.8 MMOL/L — SIGNIFICANT CHANGE UP (ref 3.5–5.3)
POTASSIUM SERPL-SCNC: 3.7 MMOL/L — SIGNIFICANT CHANGE UP (ref 3.5–5.3)
POTASSIUM SERPL-SCNC: 3.8 MMOL/L — SIGNIFICANT CHANGE UP (ref 3.5–5.3)
PROT SERPL-MCNC: 6.3 G/DL — SIGNIFICANT CHANGE UP (ref 6–8.3)
PROT SERPL-MCNC: 6.5 G/DL — SIGNIFICANT CHANGE UP (ref 6–8.3)
PROTHROM AB SERPL-ACNC: 28.4 SEC — HIGH (ref 9.8–12.7)
PROTHROM AB SERPL-ACNC: 28.7 SEC — HIGH (ref 9.8–12.7)
PROTHROM AB SERPL-ACNC: 28.8 SEC — HIGH (ref 9.8–12.7)
PROTHROM AB SERPL-ACNC: 31.5 SEC — HIGH (ref 9.8–12.7)
PROTHROM AB SERPL-ACNC: 32.8 SEC — HIGH (ref 9.8–12.7)
PROTHROM AB SERPL-ACNC: 46 SEC — HIGH (ref 9.8–12.7)
PROTHROM AB SERPL-ACNC: 60.4 SEC — HIGH (ref 9.8–12.7)
RBC # BLD: 3.1 M/UL — LOW (ref 3.8–5.2)
RBC # BLD: 3.54 M/UL — LOW (ref 3.8–5.2)
RBC # BLD: 3.54 M/UL — LOW (ref 3.8–5.2)
RBC # BLD: 3.55 M/UL — LOW (ref 3.8–5.2)
RBC # BLD: 3.76 M/UL — LOW (ref 3.8–5.2)
RBC # FLD: 14 % — SIGNIFICANT CHANGE UP (ref 10.3–14.5)
RBC # FLD: 14.3 % — SIGNIFICANT CHANGE UP (ref 10.3–14.5)
RBC # FLD: 14.3 % — SIGNIFICANT CHANGE UP (ref 10.3–14.5)
RBC # FLD: 14.7 % — HIGH (ref 10.3–14.5)
RBC # FLD: 16.2 % — HIGH (ref 10.3–14.5)
RH IG SCN BLD-IMP: NEGATIVE — SIGNIFICANT CHANGE UP
SAO2 % BLDV: 23 % — LOW (ref 67–88)
SAO2 % BLDV: 51 % — LOW (ref 67–88)
SODIUM SERPL-SCNC: 138 MMOL/L — SIGNIFICANT CHANGE UP (ref 135–145)
SODIUM SERPL-SCNC: 141 MMOL/L — SIGNIFICANT CHANGE UP (ref 135–145)
TROPONIN T SERPL-MCNC: 0.1 NG/ML — HIGH (ref 0–0.06)
WBC # BLD: 13.1 K/UL — HIGH (ref 3.8–10.5)
WBC # BLD: 13.3 K/UL — HIGH (ref 3.8–10.5)
WBC # BLD: 15.1 K/UL — HIGH (ref 3.8–10.5)
WBC # BLD: 15.4 K/UL — HIGH (ref 3.8–10.5)
WBC # BLD: 17 K/UL — HIGH (ref 3.8–10.5)
WBC # FLD AUTO: 13.1 K/UL — HIGH (ref 3.8–10.5)
WBC # FLD AUTO: 13.3 K/UL — HIGH (ref 3.8–10.5)
WBC # FLD AUTO: 15.1 K/UL — HIGH (ref 3.8–10.5)
WBC # FLD AUTO: 15.4 K/UL — HIGH (ref 3.8–10.5)
WBC # FLD AUTO: 17 K/UL — HIGH (ref 3.8–10.5)

## 2017-08-08 PROCEDURE — 36247 INS CATH ABD/L-EXT ART 3RD: CPT | Mod: RT

## 2017-08-08 PROCEDURE — 71010: CPT | Mod: 26

## 2017-08-08 PROCEDURE — 36248 INS CATH ABD/L-EXT ART ADDL: CPT

## 2017-08-08 PROCEDURE — 71010: CPT | Mod: 26,77

## 2017-08-08 PROCEDURE — 76937 US GUIDE VASCULAR ACCESS: CPT | Mod: 26

## 2017-08-08 PROCEDURE — 70450 CT HEAD/BRAIN W/O DYE: CPT | Mod: 26

## 2017-08-08 PROCEDURE — 71275 CT ANGIOGRAPHY CHEST: CPT | Mod: 26

## 2017-08-08 PROCEDURE — 36556 INSERT NON-TUNNEL CV CATH: CPT

## 2017-08-08 PROCEDURE — 99292 CRITICAL CARE ADDL 30 MIN: CPT

## 2017-08-08 PROCEDURE — 99291 CRITICAL CARE FIRST HOUR: CPT

## 2017-08-08 PROCEDURE — 37243 VASC EMBOLIZE/OCCLUDE ORGAN: CPT

## 2017-08-08 PROCEDURE — 74174 CTA ABD&PLVS W/CONTRAST: CPT | Mod: 26

## 2017-08-08 PROCEDURE — 93010 ELECTROCARDIOGRAM REPORT: CPT

## 2017-08-08 PROCEDURE — 99291 CRITICAL CARE FIRST HOUR: CPT | Mod: 25

## 2017-08-08 RX ORDER — PROTHROMBIN COMPLEX CONCENTRATE (HUMAN) 25.5; 16.5; 24; 22; 22; 26 [IU]/ML; [IU]/ML; [IU]/ML; [IU]/ML; [IU]/ML; [IU]/ML
500 POWDER, FOR SOLUTION INTRAVENOUS ONCE
Qty: 500 | Refills: 0 | Status: DISCONTINUED | OUTPATIENT
Start: 2017-08-08 | End: 2017-08-08

## 2017-08-08 RX ORDER — PHYTONADIONE (VIT K1) 5 MG
10 TABLET ORAL ONCE
Qty: 0 | Refills: 0 | Status: COMPLETED | OUTPATIENT
Start: 2017-08-08 | End: 2017-08-08

## 2017-08-08 RX ORDER — FENTANYL CITRATE 50 UG/ML
50 INJECTION INTRAVENOUS ONCE
Qty: 0 | Refills: 0 | Status: DISCONTINUED | OUTPATIENT
Start: 2017-08-08 | End: 2017-08-08

## 2017-08-08 RX ORDER — FENTANYL CITRATE 50 UG/ML
1 INJECTION INTRAVENOUS
Qty: 5000 | Refills: 0 | Status: DISCONTINUED | OUTPATIENT
Start: 2017-08-08 | End: 2017-08-09

## 2017-08-08 RX ORDER — MAGNESIUM SULFATE 500 MG/ML
2 VIAL (ML) INJECTION ONCE
Qty: 0 | Refills: 0 | Status: COMPLETED | OUTPATIENT
Start: 2017-08-08 | End: 2017-08-08

## 2017-08-08 RX ORDER — SODIUM CHLORIDE 9 MG/ML
3 INJECTION INTRAMUSCULAR; INTRAVENOUS; SUBCUTANEOUS ONCE
Qty: 0 | Refills: 0 | Status: COMPLETED | OUTPATIENT
Start: 2017-08-08 | End: 2017-08-08

## 2017-08-08 RX ORDER — SODIUM CHLORIDE 9 MG/ML
500 INJECTION, SOLUTION INTRAVENOUS ONCE
Qty: 0 | Refills: 0 | Status: COMPLETED | OUTPATIENT
Start: 2017-08-08 | End: 2017-08-08

## 2017-08-08 RX ORDER — PROTHROMBIN COMPLEX CONCENTRATE (HUMAN) 25.5; 16.5; 24; 22; 22; 26 [IU]/ML; [IU]/ML; [IU]/ML; [IU]/ML; [IU]/ML; [IU]/ML
500 POWDER, FOR SOLUTION INTRAVENOUS ONCE
Qty: 0 | Refills: 0 | Status: DISCONTINUED | OUTPATIENT
Start: 2017-08-08 | End: 2017-08-09

## 2017-08-08 RX ORDER — PANTOPRAZOLE SODIUM 20 MG/1
40 TABLET, DELAYED RELEASE ORAL DAILY
Qty: 0 | Refills: 0 | Status: DISCONTINUED | OUTPATIENT
Start: 2017-08-08 | End: 2017-08-11

## 2017-08-08 RX ORDER — INSULIN LISPRO 100/ML
VIAL (ML) SUBCUTANEOUS EVERY 6 HOURS
Qty: 0 | Refills: 0 | Status: DISCONTINUED | OUTPATIENT
Start: 2017-08-08 | End: 2017-08-21

## 2017-08-08 RX ORDER — PROTHROMBIN COMPLEX CONCENTRATE (HUMAN) 25.5; 16.5; 24; 22; 22; 26 [IU]/ML; [IU]/ML; [IU]/ML; [IU]/ML; [IU]/ML; [IU]/ML
1500 POWDER, FOR SOLUTION INTRAVENOUS ONCE
Qty: 1500 | Refills: 0 | Status: COMPLETED | OUTPATIENT
Start: 2017-08-08 | End: 2017-08-08

## 2017-08-08 RX ORDER — SODIUM CHLORIDE 9 MG/ML
1000 INJECTION, SOLUTION INTRAVENOUS
Qty: 0 | Refills: 0 | Status: DISCONTINUED | OUTPATIENT
Start: 2017-08-08 | End: 2017-08-09

## 2017-08-08 RX ORDER — METOPROLOL TARTRATE 50 MG
5 TABLET ORAL EVERY 6 HOURS
Qty: 0 | Refills: 0 | Status: DISCONTINUED | OUTPATIENT
Start: 2017-08-08 | End: 2017-08-09

## 2017-08-08 RX ORDER — PROTHROMBIN COMPLEX CONCENTRATE (HUMAN) 25.5; 16.5; 24; 22; 22; 26 [IU]/ML; [IU]/ML; [IU]/ML; [IU]/ML; [IU]/ML; [IU]/ML
1500 POWDER, FOR SOLUTION INTRAVENOUS ONCE
Qty: 1500 | Refills: 0 | Status: DISCONTINUED | OUTPATIENT
Start: 2017-08-08 | End: 2017-08-08

## 2017-08-08 RX ADMIN — FENTANYL CITRATE 50 MICROGRAM(S): 50 INJECTION INTRAVENOUS at 07:22

## 2017-08-08 RX ADMIN — FENTANYL CITRATE 3.77 MICROGRAM(S)/KG/HR: 50 INJECTION INTRAVENOUS at 20:09

## 2017-08-08 RX ADMIN — Medication 50 GRAM(S): at 17:10

## 2017-08-08 RX ADMIN — PROTHROMBIN COMPLEX CONCENTRATE (HUMAN) 400 INTERNATIONAL UNIT(S): 25.5; 16.5; 24; 22; 22; 26 POWDER, FOR SOLUTION INTRAVENOUS at 07:06

## 2017-08-08 RX ADMIN — SODIUM CHLORIDE 75 MILLILITER(S): 9 INJECTION, SOLUTION INTRAVENOUS at 17:10

## 2017-08-08 RX ADMIN — FENTANYL CITRATE 50 MICROGRAM(S): 50 INJECTION INTRAVENOUS at 08:00

## 2017-08-08 RX ADMIN — SODIUM CHLORIDE 500 MILLILITER(S): 9 INJECTION, SOLUTION INTRAVENOUS at 17:35

## 2017-08-08 RX ADMIN — Medication 102 MILLIGRAM(S): at 08:12

## 2017-08-08 RX ADMIN — SODIUM CHLORIDE 75 MILLILITER(S): 9 INJECTION, SOLUTION INTRAVENOUS at 20:09

## 2017-08-08 RX ADMIN — PANTOPRAZOLE SODIUM 40 MILLIGRAM(S): 20 TABLET, DELAYED RELEASE ORAL at 17:08

## 2017-08-08 RX ADMIN — SODIUM CHLORIDE 500 MILLILITER(S): 9 INJECTION, SOLUTION INTRAVENOUS at 23:56

## 2017-08-08 RX ADMIN — SODIUM CHLORIDE 3 MILLILITER(S): 9 INJECTION INTRAMUSCULAR; INTRAVENOUS; SUBCUTANEOUS at 06:52

## 2017-08-08 NOTE — PROCEDURE NOTE - NSPROCDETAILS_GEN_ALL_CORE
guidewire recovered/sterile technique, catheter placed/lumen(s) aspirated and flushed/sterile dressing applied/ultrasound guidance

## 2017-08-08 NOTE — PROGRESS NOTE ADULT - ASSESSMENT
A/P: 83 y/o female with h/o known liver lesion now with active bleeding, hemoperitoneum and transiently responsive hemodynamic instability.  Plan is for mesenteric angiogram and embolization.

## 2017-08-08 NOTE — CHART NOTE - NSCHARTNOTEFT_GEN_A_CORE
Interventional Radiology Post Procedure Note:    Pre procedure diagnosis: Right liver mass  Post procedure diagnosis: same  Indications for procedure: Symptomatic liver mass hemorrhage  Procedure performed: Celiac angiogram and embolization of right hepatic mass  (s): Jose Miguel Brasher  Assistant(s): Elvin Herbert  Anesthesia type: 2% lidocaine, GETA  Sedation type: none  Access: right CFA 5Fr  Closure: Sheath secured in place with 2-0 prolene as INR>2  Implants: 250 micrometer Embozene particles  Aspirate: none  Specimen: none  Estimated Blood Loss: 10cc  Contrast administered: 75 cc Visipaque   Complications: none  Disposition: SICU  Condition: Stable  Findings: Large right hepatic mass with contrast extravasation.  Embolization of feeding vessels to stasis.  No evidence of nontarget embolization  Plan: SICU observation with sheath removal when INR<1.5    Please call Interventional Radiology with any questions, concerns, or issues.

## 2017-08-08 NOTE — CONSULT NOTE ADULT - ASSESSMENT
ELY WOOTEN is a 84y Female with h/o CHF, CAD s/p CABG, A.fib on Xarelto, PPM, MVR s/p clipping, HTN, HLD, DM who presented to ED after fall from standing x 2 in past 24hrs.  Pt denies LOC, admits to SOB and abdominal pain.  After +FAST in ED, pt underwent CTA which showed a 10.2cm complex liver cyst with high attenuation, concerning for active bleed. Pt taken to IR and underwent embolization of 4 vessels leading to cyst.  While in ED pt was tachycardic to 120s and received 1L bolus, 2 units PRBC, 2 FFP, 1500 K-centra and 10mg Vit K.  Emergent L femoral Introducer and A-line were placed.  In IR patient was intubated, briefly on Vitaly and received 4L crystalloid, 2 units PRBC, 6 FFP, 1 pack plts, and additional 1000 K-centra.    Neuro: post-procedure pain control  -Fentanyl gtt    Resp: Intubated for procedure  -f/u CXR, ABG  -possible SBT    CV: Hemorrhagic shock class I s/p resuscitation; h/o PAF on Xarelto, CHF, CAD s/p CABG, MVR s/p clipping, PPM, HTN, HLD  -Hemodynamic monitoring  -LR @ 75mL/hr  -Trend lactate   -f/u EKG, cardiology     GI: h/o GERD, enteritis on CT  -NPO/NGT  -Protonix     /Renal: MYA  -LR @ 75mL/hr  -f/u repeat labs  -strict I's/O's    Heme: Hemorrhagic shock s/p IR embolization liver cyst  -CBC, coags q4hrs  -SCDs    ID: Afebrile  -Trend WBC    Endo: h/o DM  -ISS    Lines:   -L fem A-line, Intro  -R fem IR sheath     Dispo: Full code, SICU care. Case discussed with Dr. Jennyfer Pelayo, PA-C #[id number] ELY WOOTEN is a 84y Female with h/o CHF, CAD s/p CABG, A.fib on Xarelto, PPM, MVR s/p clipping, HTN, HLD, DM who presented to ED after fall from standing x 2 in past 24hrs.  After +FAST in ED, pt underwent CTA which showed a 10.2cm complex liver cyst with high attenuation, concerning for active bleed. Pt taken to IR and underwent embolization of 4 vessels leading to cyst.  +class I hemorrhagic shock s/p resuscitation with 4 units PRBC, 6 FFP, 1 pack plts, 2500 K-centra, 10mg Vit K.  Received in SICU post-IR embolization intubated, off Vitaly gtt.     Neuro: post-procedure pain control  -Fentanyl gtt    Resp: Intubated for procedure  -f/u CXR, ABG  -possible SBT    CV: Hemorrhagic shock class I s/p resuscitation; h/o PAF on Xarelto, CHF, CAD s/p CABG, MVR s/p clipping, PPM, HTN, HLD  -Hemodynamic monitoring  -LR @ 75mL/hr  -Trend lactate   -f/u EKG, cardiology     GI: h/o GERD, enteritis on CT  -NPO/NGT  -Protonix     /Renal: MYA  -LR @ 75mL/hr  -f/u repeat labs  -strict I's/O's    Heme: Hemorrhagic shock s/p IR embolization liver cyst  -CBC, coags q4hrs  -SCDs    ID: Afebrile  -Trend WBC    Endo: h/o DM  -ISS    Lines:   -L fem A-line, Intro  -R fem IR sheath     Dispo: Full code, SICU care. Case discussed with Dr. Jennyfer Pelayo, PA-C #[id number]

## 2017-08-08 NOTE — ED PROVIDER NOTE - PHYSICAL EXAMINATION
Attending Sheppard: gen: ill appearing, tachypnic, heent; atraumatic, eomi, sclera pale, eomi, cv: tachycardic, lungs; ctab, abd: diffusely tender to palpation, no rebound or guarding, ext decreased pulses le, skin: wwp, neuro: awake and alert, following commands

## 2017-08-08 NOTE — PROGRESS NOTE ADULT - ATTENDING COMMENTS
Pt with altered mental status likely secondary to pain and hypovolemia.  Unable to obtain informed consent.  Unsuccessful attempt to reach Next of Kin.  Given the patient's clinical picture of active hemorrhage including hemodynamic instability transiently responsive to resuscitation and evidence of active bleeding on CT including contrast extravasation and hemoperitoneum, will proceed with procedure emergently. Dr. Fong at bedside and concurrs.

## 2017-08-08 NOTE — CONSULT NOTE ADULT - SUBJECTIVE AND OBJECTIVE BOX
HISTORY OF PRESENT ILLNESS:  ELY WOOTEN is a 84y Female with h/o CHF, CAD s/p CABG, A.fib on Xarelto, PPM, MVR s/p clipping, HTN, HLD, DM who presented to ED after fall from standing x 2 in past 24hrs.  Pt denies LOC, admits to SOB and abdominal pain.  After +FAST in ED, pt underwent CTA which showed a 10.2cm complex liver cyst with high attenuation, concerning for active bleed. Pt taken to IR and underwent embolization of 4 vessels leading to cyst.  While in ED pt was tachycardic to 120s and received 1L bolus, 2 units PRBC, 2 FFP, 1500 K-centra and 10mg Vit K.  Emergent L femoral Introducer and A-line were placed.  In IR patient was intubated, briefly on Vitaly and received 4L crystalloid, 2 units PRBC, 6 FFP, 1 pack plts, and additional 1000 K-centra.      PAST MEDICAL HISTORY: Type 2 diabetes mellitus  Dyspnea  Non-rheumatic mitral regurgitation s/p Mitral valve clip   Breast cancer  Hypertension  HLD   PAF on Xarelto  CAD s/p CABG  Cataract  Gout  HTN  GERD       PAST SURGICAL HISTORY: Cardiac pacemaker  Mitral valve clip   S/P L breast lumpectomy  S/P cataract extraction  S/P CABG  S/P hysterectomy      FAMILY HISTORY: No pertinent family history in first degree relatives  Family history of bone cancer    SOCIAL HISTORY:    CODE STATUS: Full code     HOME MEDICATIONS: ASA, Xarelto, Lasix, Hydralazine, Lisinopril, Metoprolol, Protonix, KCl, Senna, Colace, Zetia, Omega 3, MVI    ALLERGIES: No Known Allergies    VITAL SIGNS:  ICU Vital Signs Last 24 Hrs  T(C): --  T(F): --  HR: 110 (08 Aug 2017 13:58) (99 - 128)  BP: 104/60 (08 Aug 2017 06:34) (78/56 - 107/70)  BP(mean): --  ABP: --  ABP(mean): --  RR: --  SpO2: 100% (08 Aug 2017 13:58) (100% - 100%)      NEURO  Exam: RASS -2     RESPIRATORY  Mechanical Ventilation: Mode: PRVC, RR (machine): 18, RR (patient): 18, TV (machine): 450, FiO2: 40, PEEP: 5, ITime: 1, MAP: 12, PIP: 25  Exam: Coarse breath sounds B/L.       CARDIOVASCULAR  VBG - ( 08 Aug 2017 07:29 )  pH: 7.41  /  pCO2: 44    /  pO2: 21    / HCO3: 27    / Base Excess: 2.5   /  SaO2: 23     Lactate: 2.9     Exam: Sinus tachycardia. +S1, +S2.   Cardiac Rhythm: Sinus       GI/NUTRITION  Exam:  Diet: NPO   pantoprazole  Injectable 40 milliGRAM(s) IV Push daily      GENITOURINARY/RENAL  lactated ringers. 1000 milliLiter(s) IV Continuous <Continuous>      08-08    138  |  98  |  25<H>  ----------------------------<  134<H>  3.8   |  23  |  1.49<H>    Ca    8.4      08 Aug 2017 06:16  Phos  3.5     08-08  Mg     1.9     08-08    TPro  6.3  /  Alb  3.2<L>  /  TBili  1.7<H>  /  DBili  x   /  AST  27  /  ALT  17  /  AlkPhos  71  08-08    [ x] Reyez catheter, indication: urine output monitoring in critically ill patient    HEMATOLOGIC  [ ] VTE Prophylaxis: N/A  prothrombin complex concentrate Injectable (KCENTRA) 500 International Unit(s) IV Push once  prothrombin complex concentrate Injectable (KCENTRA) 500 International Unit(s) IV Push once                          10.1   13.1  )-----------( 101      ( 08 Aug 2017 10:42 )             31.2     PT/INR - ( 08 Aug 2017 13:11 )   PT: 31.5 sec;   INR: 2.83 ratio         PTT - ( 08 Aug 2017 10:42 )  PTT:40.8 sec  Transfusion: [4 ] PRBC	[1 ] Platelets	[8 ] FFP	[ ] Cryoprecipitate      K-centra 2500, Vitamin K 10mg      INFECTIOUS DISEASES    RECENT CULTURES:    ENDOCRINE    CAPILLARY BLOOD GLUCOSE      PATIENT CARE ACCESS DEVICES:  [x ] Peripheral IV  [x ] Central Venous Line	[ ] R	[x ] L	[ ] IJ	[x ] Fem   Intro	[ ] SC	Placed: 8/8              R fem IR sheath   [x ] Arterial Line		[ ] R	[x ] L	[x ] Fem	[ ] Rad	[ ] Ax	Placed:   [ ] PICC:					[ ] Mediport  [x ] Urinary Catheter, Date Placed:   [x] Necessity of urinary, arterial, and venous catheters discussed    OTHER MEDICATIONS:     IMAGING STUDIES:  CTA abd/pelvis:  IMPRESSION:  1. 10.2 cm, complex cyst with internal high attenuation concerning for  bleeding. An arterial vessel is noted inside this cystic structure  concerning for active bleeding.  2. Moderate to large volume hemoperitoneum.  3. Small bowel enteritis. HISTORY OF PRESENT ILLNESS:  ELY WOOTEN is a 84y Female with h/o CHF, CAD s/p CABG, A.fib on Xarelto, PPM, MVR s/p clipping, HTN, HLD, DM who presented to ED after fall from standing x 2 in past 24hrs.  Pt denies LOC, admits to SOB and abdominal pain.  After +FAST in ED, pt underwent CTA which showed a 10.2cm complex liver cyst with high attenuation, concerning for active bleed. Pt taken to IR and underwent embolization of 4 vessels leading to cyst.  While in ED pt was tachycardic to 120s and received 1L bolus, 2 units PRBC, 2 FFP, 1500 K-centra and 10mg Vit K.  Emergent L femoral Introducer and A-line were placed.  In IR patient was intubated, briefly on Vitaly and received 4L crystalloid, 2 units PRBC, 6 FFP, 1 pack plts, and additional 1000 K-centra.      PAST MEDICAL HISTORY: Type 2 diabetes mellitus  Dyspnea  Non-rheumatic mitral regurgitation s/p Mitral valve clip   Breast cancer  Hypertension  HLD   PAF on Xarelto  CAD s/p CABG  Cataract  Gout  HTN  GERD       PAST SURGICAL HISTORY: Cardiac pacemaker  Mitral valve clip   S/P L breast lumpectomy  S/P cataract extraction  S/P CABG  S/P hysterectomy      FAMILY HISTORY: No pertinent family history in first degree relatives  Family history of bone cancer    SOCIAL HISTORY:    CODE STATUS: Full code     HOME MEDICATIONS: ASA, Xarelto, Lasix, Hydralazine, Lisinopril, Metoprolol, Protonix, KCl, Senna, Colace, Zetia, Omega 3, MVI    ALLERGIES: No Known Allergies    VITAL SIGNS:  ICU Vital Signs Last 24 Hrs  T(C): --  T(F): --  HR: 110 (08 Aug 2017 13:58) (99 - 128)  BP: 104/60 (08 Aug 2017 06:34) (78/56 - 107/70)  BP(mean): --  ABP: --  ABP(mean): --  RR: --  SpO2: 100% (08 Aug 2017 13:58) (100% - 100%)      NEURO  Exam: RASS -2     RESPIRATORY  Mechanical Ventilation: Mode: PRVC, RR (machine): 18, RR (patient): 18, TV (machine): 450, FiO2: 40, PEEP: 5, ITime: 1, MAP: 12, PIP: 25  Exam: Coarse breath sounds B/L.       CARDIOVASCULAR  VBG - ( 08 Aug 2017 07:29 )  pH: 7.41  /  pCO2: 44    /  pO2: 21    / HCO3: 27    / Base Excess: 2.5   /  SaO2: 23     Lactate: 2.9     Exam: Tachycardic, irregularly irregular. +S1, +S2  Cardiac Rhythm: A.fib       GI/NUTRITION  Exam: Moderately distended, firm.   Diet: NPO   pantoprazole  Injectable 40 milliGRAM(s) IV Push daily      GENITOURINARY/RENAL  lactated ringers. 1000 milliLiter(s) IV Continuous <Continuous>      08-08    138  |  98  |  25<H>  ----------------------------<  134<H>  3.8   |  23  |  1.49<H>    Ca    8.4      08 Aug 2017 06:16  Phos  3.5     08-08  Mg     1.9     08-08    TPro  6.3  /  Alb  3.2<L>  /  TBili  1.7<H>  /  DBili  x   /  AST  27  /  ALT  17  /  AlkPhos  71  08-08    [ x] Reyez catheter, indication: urine output monitoring in critically ill patient    HEMATOLOGIC  [ ] VTE Prophylaxis: N/A  prothrombin complex concentrate Injectable (KCENTRA) 500 International Unit(s) IV Push once  prothrombin complex concentrate Injectable (KCENTRA) 500 International Unit(s) IV Push once                          10.1   13.1  )-----------( 101      ( 08 Aug 2017 10:42 )             31.2     PT/INR - ( 08 Aug 2017 13:11 )   PT: 31.5 sec;   INR: 2.83 ratio         PTT - ( 08 Aug 2017 10:42 )  PTT:40.8 sec  Transfusion: [4 ] PRBC	[1 ] Platelets	[8 ] FFP	[ ] Cryoprecipitate      K-centra 2500, Vitamin K 10mg      INFECTIOUS DISEASES    RECENT CULTURES:    ENDOCRINE    CAPILLARY BLOOD GLUCOSE      PATIENT CARE ACCESS DEVICES:  [x ] Peripheral IV  [x ] Central Venous Line	[ ] R	[x ] L	[ ] IJ	[x ] Fem   Intro	[ ] SC	Placed: 8/8              R fem IR sheath   [x ] Arterial Line		[ ] R	[x ] L	[x ] Fem	[ ] Rad	[ ] Ax	Placed:   [ ] PICC:					[ ] Mediport  [x ] Urinary Catheter, Date Placed:   [x] Necessity of urinary, arterial, and venous catheters discussed    OTHER MEDICATIONS:     IMAGING STUDIES:  CTA abd/pelvis:  IMPRESSION:  1. 10.2 cm, complex cyst with internal high attenuation concerning for  bleeding. An arterial vessel is noted inside this cystic structure  concerning for active bleeding.  2. Moderate to large volume hemoperitoneum.  3. Small bowel enteritis.

## 2017-08-08 NOTE — H&P ADULT - PMH
Breast cancer  s/p resection, chemo x 3 treatments only, completed radiation  CAD (coronary artery disease)    Cataract  b/l  Dyspnea  at rest  GERD (gastroesophageal reflux disease)    Gout    HLD (hyperlipidemia)    HTN    Hypertension    Non-rheumatic mitral regurgitation  Severe  PAF (paroxysmal atrial fibrillation)  diagnosed 7/2013  Type 2 diabetes mellitus  Diet controlled, Hg A1C 6.1 ( 4/22/17) Breast cancer  s/p resection, chemo x 3 treatments only, completed radiation  CAD (coronary artery disease)    Cataract  b/l  Dyspnea  at rest  GERD (gastroesophageal reflux disease)    Gout    HLD (hyperlipidemia)    Hypertension    Non-rheumatic mitral regurgitation  Severe  PAF (paroxysmal atrial fibrillation)  diagnosed 7/2013  Type 2 diabetes mellitus  Diet controlled, Hg A1C 6.1 ( 4/22/17) Breast cancer  s/p resection, chemo x 3 treatments only, completed radiation  CAD (coronary artery disease)    Cataract  b/l  Dyspnea  at rest  GERD (gastroesophageal reflux disease)    Gout    Heart failure    HLD (hyperlipidemia)    Hypertension    Non-rheumatic mitral regurgitation  Severe  PAF (paroxysmal atrial fibrillation)  diagnosed 7/2013  Type 2 diabetes mellitus  Diet controlled, Hg A1C 6.1 ( 4/22/17)

## 2017-08-08 NOTE — PATIENT PROFILE ADULT. - PMH
PAF (paroxysmal atrial fibrillation)  7/13 prior to CABG  CAD (coronary artery disease)    Cataract  Left  Gout    HTN    GERD (gastroesophageal reflux disease)

## 2017-08-08 NOTE — ED ADULT NURSE REASSESSMENT NOTE - NS ED NURSE REASSESS COMMENT FT1
Spoke with Michelle from [facility] and informed her that [employer] [facility] transported unused MTP coolers x3 up to IR w/ pt. Charge MANFRED bustamante.

## 2017-08-08 NOTE — ED PROVIDER NOTE - OBJECTIVE STATEMENT
Attending Sheppard: 85 y/o female h/o afib, known hepatic lesion, cardiac disease presenting with abdominal pain. pt with 2 reported syncopal episodes. pt states yesterday she had a syncopal episode and lost consciousness. today did not feel well and reportedly had an additional sycnopal episode. pt did strike her head. upon ems arrival pt states had adbominal pain and sob. no black or bloody stools Attending Sheppard: 83 y/o female h/o afib, known hepatic lesion, cardiac disease presenting with abdominal pain. pt with 2 reported syncopal episodes. pt states yesterday she had a syncopal episode and lost consciousness. today did not feel well and reportedly had an additional sycnopal episode. pt did strike her head. upon ems arrival pt states had adbominal pain and sob. no black or bloody stools. pt on xarelto

## 2017-08-08 NOTE — ED PROVIDER NOTE - MEDICAL DECISION MAKING DETAILS
Attending Sheppard: 83 y/o female with multiple medical issues presenting with abdominal pain. upon arrival pt ill appearing, tachypnic. pocus shows sig free fluid within the abdomen. pt reporting worsening abdominal pain. pt taken immediately to CT as concern for intra abdominal pathology. CT showed intraperitoneal hemorrhage from large liver mass. IR and surgery consulted. as pt with 2 episodes of syncopy. Level 1 trauma called as unclear whether bleeding due to fall. pt with supratherapeutic inr. given kcentra and vitamin K. pt will need IR. Plan to admit

## 2017-08-08 NOTE — ED PROVIDER NOTE - PMH
Breast cancer  s/p resection, chemo x 3 treatments only, completed radiation  CAD (coronary artery disease)    Cataract  b/l  Dyspnea  at rest  GERD (gastroesophageal reflux disease)    Gout    HLD (hyperlipidemia)    HTN    Hypertension    Non-rheumatic mitral regurgitation  Severe  PAF (paroxysmal atrial fibrillation)  diagnosed 7/2013 , on Aspirin only, no longer taking  Eliquis  Type 2 diabetes mellitus  Diet controlled, Hg A1C 6.1 ( 4/22/17) Breast cancer  s/p resection, chemo x 3 treatments only, completed radiation  CAD (coronary artery disease)    Cataract  b/l  Dyspnea  at rest  GERD (gastroesophageal reflux disease)    Gout    Heart failure    HLD (hyperlipidemia)    Hypertension    Non-rheumatic mitral regurgitation  Severe  PAF (paroxysmal atrial fibrillation)  diagnosed 7/2013  Type 2 diabetes mellitus  Diet controlled, Hg A1C 6.1 ( 4/22/17)

## 2017-08-08 NOTE — ED ADULT NURSE NOTE - OBJECTIVE STATEMENT
pt biba s/p trip and fall.  as per ems, pt had a syncopal episode yesterday, then she tripped and fell today causing her to c/o increased abdominal pain.  upon examination, free fluid found via ultrasound in abdomen.  pt denies any n/v/d or constipation.  pt is guiac negative.  ct scan performed to r/o triple A vs. abdominal dissection.  pt is awake, alert and responsive to all stimuli.  no c/o sob or respiratory distress noted.  pt recvd fentanyl, as per md's orders for pain.  pt transferred to critical A for upgrade to level of care.  pt endorsed to MANFRED Johnson and MANFRED Hung for continuum of care.

## 2017-08-08 NOTE — H&P ADULT - NSHPPHYSICALEXAM_GEN_ALL_CORE
HR: 99 (08-08-17 @ 06:34) (99 - 128)  BP: 104/60 (08-08-17 @ 06:34) (78/56 - 107/70)  SpO2: 100% (08-08-17 @ 05:40) (100% - 100%)    Gen: NAD  HEENT: normocephalic, atraumatic, no scleral icterus  CV: S1, S2, RRR  Pulm: CTA B/L  Abd: Soft, ND, NTP, no rebound, no guarding, no palpable organomegaly/masses  Ext: warm, no edema, palp dp/pt HR: 99 (08-08-17 @ 06:34) (99 - 128)  BP: 104/60 (08-08-17 @ 06:34) (78/56 - 107/70)  SpO2: 100% (08-08-17 @ 05:40) (100% - 100%)    Gen: NAD  HEENT: normocephalic, atraumatic, no scleral icterus, PERRLA, no cervical spine tenderness, trachea midline  CV: tachycardic  Pulm: CTA B/L  Back: no abrasions/tenderness  Abd: distended, tense, with diffuse tenderness  Rectal: no blood  Ext: warm, faintly palpable DP b/l on arrival

## 2017-08-08 NOTE — PROGRESS NOTE ADULT - SUBJECTIVE AND OBJECTIVE BOX
Interventional Radiology    Patient is a 84y old  Female with h/o afib, CAD s/p CAGB (on xarelto), DMII, HTN, HLD and known liver lesion who presented to ED with SOB and abdominal pain. Pt reported to have 2 episodes of syncope. CTA from today demonstrated "10.2 cm, complex cyst with internal high attenuation concerning for bleeding. An arterial vessel is noted inside this cystic structure  concerning for active bleeding." as well as mod-lrg hemoperitoneum. In ED, Pt tachy to 126 and BP of 78/56, INR 5.4, H/H: 9.0/27.2. Pt received 2U of PRBC, 1U FFP, Kcentra and vitamin K in ED. IR contacted for possible liver embolization. Case was d/w Dr. Fong ([facility]) and Dr. Brasher. Currently HR: 99 and BP: 104/60. Repeat INR 4.10.       PAST MEDICAL & SURGICAL HISTORY:  Type 2 diabetes mellitus: Diet controlled, Hg A1C 6.1 ( 4/22/17)  Dyspnea: at rest  Non-rheumatic mitral regurgitation: Severe  Breast cancer: s/p resection, chemo x 3 treatments only, completed radiation  Hypertension  HLD (hyperlipidemia)  PAF (paroxysmal atrial fibrillation): diagnosed 7/2013 , on Aspirin only, no longer taking  Eliquis  CAD (coronary artery disease)  Cataract: b/l  Gout  HTN  GERD (gastroesophageal reflux disease)  S/P breast lumpectomy: left 7/20/2016  S/P cataract extraction: bilaterally-2014 withy artificial lenses  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  S/P hysterectomy      Allergies: No Known Allergies      PHYSICAL EXAM: Gen:  Abd:     Vital Signs Last 24 Hrs  T(C): --  T(F): --  HR: 99 (08 Aug 2017 06:34) (99 - 128)  BP: 104/60 (08 Aug 2017 06:34) (78/56 - 107/70)  BP(mean): --  RR: --  SpO2: 100% (08 Aug 2017 05:40) (100% - 100%)      LABS:                        9.0    13.3  )-----------( 203      ( 08 Aug 2017 06:16 )             27.2     08-08    138  |  98  |  25<H>  ----------------------------<  134<H>  3.8   |  23  |  1.49<H>    Ca    8.4      08 Aug 2017 06:16  Phos  3.5     08-08  Mg     1.9     08-08    TPro  6.3  /  Alb  3.2<L>  /  TBili  1.7<H>  /  DBili  x   /  AST  27  /  ALT  17  /  AlkPhos  71  08-08    PT/INR - ( 08 Aug 2017 06:16 )   PT: 60.4 sec;   INR: 5.40 ratio         PTT - ( 08 Aug 2017 06:16 )  PTT:37.1 sec      A/P: 85 y/o female with h/o known liver lesion now with active bleeding and hemoperitoneum  - Interventional Radiology    Patient is a 84y old  Female with h/o afib, CAD s/p CAGB (on xarelto), DMII, HTN, HLD and known liver lesion who presented to ED with SOB and abdominal pain. Pt reported to have 2 episodes of syncope. CTA from today demonstrated "10.2 cm, complex cyst with internal high attenuation concerning for bleeding. An arterial vessel is noted inside this cystic structure concerning for active bleeding." as well as mod-lrg hemoperitoneum. In ED, Pt tachy to 126 and BP of 78/56, INR 5.4, H/H: 9.0/27.2. Pt received 2U of PRBC, 1U FFP, Kcentra and vitamin K in ED. IR contacted for possible liver embolization. Case was d/w Dr. Fong ([facility]) and Dr. Brasher. Currently HR: 99 and BP: 104/60. Repeat INR 4.10.       PAST MEDICAL & SURGICAL HISTORY:  Type 2 diabetes mellitus: Diet controlled, Hg A1C 6.1 ( 4/22/17)  Dyspnea: at rest  Non-rheumatic mitral regurgitation: Severe  Breast cancer: s/p resection, chemo x 3 treatments only, completed radiation  Hypertension  HLD (hyperlipidemia)  PAF (paroxysmal atrial fibrillation): diagnosed 7/2013 , on Aspirin only, no longer taking  Eliquis  CAD (coronary artery disease)  Cataract: b/l  Gout  HTN  GERD (gastroesophageal reflux disease)  S/P breast lumpectomy: left 7/20/2016  S/P cataract extraction: bilaterally-2014 withy artificial lenses  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  S/P hysterectomy      Allergies: No Known Allergies    Vital Signs Last 24 Hrs  HR: 99 (08 Aug 2017 06:34) (99 - 128)  BP: 104/60 (08 Aug 2017 06:34) (78/56 - 107/70)  RR: --  SpO2: 100% (08 Aug 2017 05:40) (100% - 100%)      LABS:                        9.0    13.3  )-----------( 203      ( 08 Aug 2017 06:16 )             27.2     08-08    138  |  98  |  25<H>  ----------------------------<  134<H>  3.8   |  23  |  1.49<H>    Ca    8.4      08 Aug 2017 06:16  Phos  3.5     08-08  Mg     1.9     08-08    TPro  6.3  /  Alb  3.2<L>  /  TBili  1.7<H>  /  DBili  x   /  AST  27  /  ALT  17  /  AlkPhos  71  08-08    PT/INR - ( 08 Aug 2017 06:16 )   PT: 60.4 sec;   INR: 5.40 ratio         PTT - ( 08 Aug 2017 06:16 )  PTT:37.1 sec

## 2017-08-08 NOTE — H&P ADULT - ATTENDING COMMENTS
Pt seen and examined.  Chart reviewed.  Resident note confirmed.  Pt is an 82 year old female with multiple medical problems who presents to [facility] s/p fall.  On presentation, her primary survey was intact.  On secondary survey, diffuse abdominal pain was noted.  Pt underwent emergent CT imaging of the head/chest/abdomen/pelvis.  A 10cm right hepatic mass with active extravasation was noted with hemoperitoneum.  Pt was returned to the trauma room and a level 1 trauma activation was called.  An elevated INR (5.4) was noted.  Kcentra/vit K/2U FFP given.  Two units of PRBC were also given.  A left femoral introducer was placed.  VIR was contacted for emergent angio/embolization.  Case was discussed with Dr. Pinto (liver surgery team) and transfer of management was performed at the pt’s bedside.  Pt was transported to the angio suite.  Blood and blood products were given to the anesthesiologist for use during the procedure.    PMH/PSH/MEDS/ALL/SH/FH/ROS:  Unchanged from H&P above  Vitals/PE/Labs/Radiographic data:  Reviewed with resident    A/P  Neuro:  MCA stroke, age undetermined  abdominal pain  Continue pain control		  For MRI when stabilized    CVS:	Hemorrhagic shock  	Continue volume resuscitation  	Monitor vitals    Pulm:  	Atelectasis  	Continue ISP    GI:	Hepatic tumor with bleeding  	Hemoperitoneum  	VIR angio  	NPO    :  	MYA  	Monitor I’s and O’s  	F/u pending labs    Heme:   Acute blood loss anemia  	Coagulopathy  	Monitor  H/H  	Continue to correct coags    ID :	Culture for fever    Endocrine:	Continue glycemic control    I spent 60 minutes in the care of this patient. Pt seen and examined.  Chart reviewed.  Resident note confirmed.  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 s/p fall.  On presentation, her primary survey was intact.  On secondary survey, diffuse abdominal pain was noted.  Pt underwent emergent CT imaging of the head/chest/abdomen/pelvis.  A 10cm right hepatic mass with active extravasation was noted with hemoperitoneum.  Pt was returned to the trauma room and a level 1 trauma activation was called.  An elevated INR (5.4) was noted.  Kcentra/vit K/2U FFP given.  Two units of PRBC were also given.  A left femoral introducer was placed.  VIR was contacted for emergent angio/embolization.  Case was discussed with Dr. Pinto (liver surgery team) and transfer of management was performed at the pt’s bedside.  Pt was transported to the angio suite.  Blood and blood products were given to the anesthesiologist for use during the procedure.  Pt is in critical condition with ongoing requirements for blood product resuscitation.    PMH/PSH/MEDS/ALL/SH/FH/ROS:  Unchanged from H&P above  Vitals/PE/Labs/Radiographic data:  Reviewed with resident    A/P  Neuro:  MCA stroke, age undetermined              abdominal pain              Continue pain control		              For MRI when stabilized    CVS:	Hemorrhagic shock  	Continue volume resuscitation  	Monitor vitals    Pulm:  	Atelectasis  	Continue ISP    GI:	Hepatic tumor with bleeding  	Hemoperitoneum  	VIR angio  	NPO    :  	MYA  	Monitor I’s and O’s  	F/u pending labs    Heme:   Acute blood loss anemia  	Coagulopathy  	Monitor  H/H  	Continue to correct coags    ID :	Culture for fever    Endocrine:	Continue glycemic control    I spent 60 minutes in the care of this patient.

## 2017-08-08 NOTE — ED PROVIDER NOTE - PROGRESS NOTE DETAILS
guaiac negative Attending Sheppard: ct shows blood within the abdomen. pt with known liver lesion. d/w radiology, active extravasation. d/w surgery. INR elevated. pt given kcenttra. as unclear whether from trauma decision made to upgrade to level 1 trauma surgery at bedside. pt with persistent rigid abdomen, blood products ordered. IR notifeid. pt tba sicu Attending Sheppard: upon arrival pt ill appearing with pale conjunctiva. pocus shows free fluid within the abdomen. unable to visualize the aorta. with h/o sig calcifications to vessels CTA chest and abd ordered to evaluate for dissection vs aaa. 2 large bore iv. emergent consent for ct. no further evidence of trauma Attending Silver: pt now hypotensive, tachycardic, surgery at bedside, will initial mass transfusion protocol

## 2017-08-08 NOTE — H&P ADULT - ASSESSMENT
84F s/p multiple syncopal falls with active extravasation from a 10.2 cm complex hepatic cyst.  -admit to surgery, Dr. Narayanan  -continue resuscitation with blood products (1:1:1)  -to IR for embolization  -transfer to SICU from IR  -d/w Dr. Fong and Dr. Narayanan 84F s/p multiple syncopal falls with active arterial extravasation from a 10.2 cm complex hepatic cyst.  -admit to surgery, Dr. Narayanan  -continue resuscitation with blood products (1:1:1)  -to IR for embolization  -transfer to SICU from IR  -d/w Dr. Fong and Dr. Narayanan

## 2017-08-08 NOTE — ED PROVIDER NOTE - CRITICAL CARE PROVIDED
consultation with other physicians/direct patient care (not related to procedure)/documentation/additional history taking/interpretation of diagnostic studies

## 2017-08-08 NOTE — H&P ADULT - PSH
S/P breast lumpectomy  left 7/20/2016  S/P CABG  x3 on 7/1/13  S/P cataract extraction  bilaterally-2014 withy artificial lenses  S/P hysterectomy Cardiac pacemaker    S/P breast lumpectomy  left 7/20/2016  S/P CABG  x3 on 7/1/13  S/P cataract extraction  bilaterally-2014 withy artificial lenses  S/P hysterectomy Cardiac pacemaker  AICD  Mitral regurgitation  s/p Mitraclip 5/2017  S/P breast lumpectomy  left 7/20/2016  S/P CABG  x3 on 7/1/13  S/P cataract extraction  bilaterally-2014 withy artificial lenses  S/P hysterectomy

## 2017-08-08 NOTE — H&P ADULT - HISTORY OF PRESENT ILLNESS
84F w/hx of Afib on Xarelto presented to ED w/SOB and abdominal pain after multiple falls. She reports a 2 syncopal episodes yesterday -- during the 2nd one she fell backwards hit her head. Subsequent to second episode she noted abdominal pain. In the ED a FAST exam demonstrated large amount of intraperitoneal blood; CTA showed confirmed moderate to large amount of hemoperitonuem along with "10.2 cm, complex cyst with internal high attenuation concerning for bleeding. An arterial vessel is noted inside this cystic structure concerning for active bleeding." A level I trauma activation was called.    On arrival to the ED primary survey was intact. GCS=15. On secondary survey only significant finding was tense, distended abdomen with diffuse tenderness. A massive transfusion protocol was initiated. She received 2u of PRBC, K-centra, Vitamin K, and 2u of FFP and a R femoral central catheter was placed prior to transfer to Interventional Radiology. 84F w/hx of Afib on Xarelto presented to ED w/SOB and abdominal pain after multiple falls. She reports 2 syncopal episodes yesterday -- during the 2nd one she fell backwards and hit her head. Subsequent to second episode she noted abdominal pain. In the ED a FAST exam demonstrated large amount of intraperitoneal blood; CTA showed confirmed moderate to large amount of hemoperitonuem along with "10.2 cm, complex cyst with internal high attenuation concerning for bleeding. An arterial vessel is noted inside this cystic structure concerning for active bleeding." A level I trauma activation was called.    On arrival to the ED airway intact, tachycardic to 120s with systolic /80, palpable radial pulses B/L. GCS=15. On secondary survey only significant finding was tense, distended abdomen with diffuse tenderness. A massive transfusion protocol was initiated. Hct = 27, INR = 5.04. She received 2u of PRBC, K-centra, Vitamin K, and 2u of FFP and a R femoral central catheter was placed prior to transfer to Interventional Radiology. She had transient hypotension to 80s systolic after initiation of 1st u PRBCs, increased to 130s systolic with additional PRBCs. 84F w/hx of Afib on Xarelto presented to ED w/SOB and abdominal pain after multiple falls. She reports 2 syncopal episodes yesterday -- during the 2nd one she fell backwards and hit her head. Subsequent to second episode she noted abdominal pain. In the ED a FAST exam demonstrated large amount of intraperitoneal blood; CTA showed confirmed moderate to large amount of hemoperitonuem along with "10.2 cm, complex cyst with internal high attenuation concerning for bleeding. An arterial vessel is noted inside this cystic structure concerning for active bleeding." CT head was also performed and negative for acute injury. A level I trauma activation was called.    On arrival to the ED airway intact, tachycardic to 120s with systolic /80, palpable radial pulses B/L. GCS=15. On secondary survey only significant finding was tense, distended abdomen with diffuse tenderness. A massive transfusion protocol was initiated. Hct = 27, INR = 5.04. She received 2u of PRBC, K-centra, Vitamin K, and 2u of FFP and a L femoral central catheter was placed prior to transfer to Interventional Radiology. She had transient hypotension to 80s systolic after initiation of 1st u PRBCs, increased to 130s systolic with additional PRBCs.

## 2017-08-08 NOTE — H&P ADULT - NSHPLABSRESULTS_GEN_ALL_CORE
Labs:                        9.0    13.3  )-----------( 203      ( 08 Aug 2017 06:16 )             27.2     08-08    138  |  98  |  25<H>  ----------------------------<  134<H>  3.8   |  23  |  1.49<H>    Ca    8.4      08 Aug 2017 06:16  Phos  3.5     08-08  Mg     1.9     08-08    TPro  6.3  /  Alb  3.2<L>  /  TBili  1.7<H>  /  DBili  x   /  AST  27  /  ALT  17  /  AlkPhos  71  08-08    PT/INR - ( 08 Aug 2017 08:13 )   PT: 46.0 sec;   INR: 4.10 ratio       PTT - ( 08 Aug 2017 08:13 )  PTT:40.3 sec    Imaging    CT A/P CHEST:     LUNGS AND LARGE AIRWAYS: Patent central airways. No pulmonary nodules.   Mild emphysema. Partial compressive atelectasis of the right lower lobe   from adjacent pleural effusion.  PLEURA: Moderate right pleural effusion .  VESSELS: No thoracic aortic dissection. Atherosclerotic change of the   thoracic aorta and great vessels. No filling defects are notable within   the central pulmonary arterial vessels.  HEART: Cardiomegaly. No pericardial effusion. AICD lead terminates in the   right ventricle. Aortic valvular and mitral annular calcifications.   Moderate coronary artery calcifications. Unchanged left ventricular   apical aneurysm.  MEDIASTINUM AND DELVIS: No lymphadenopathy.  CHEST WALL AND LOWER NECK: Within normal limits.    ABDOMEN AND PELVIS:    LIVER: Large hepatic cystic lesion 8.9 x 9.1 x 10.2 cm (AP by TV by CC).   This cyst previously appeared simple on the prior CT examination. There   are several new small arterial vessels running through the cyst, not   previously seen on the prior exams. One branch may arise from the right   hepatic artery (series 13, images 260-267). The internal contents of the   cystic lesion is also hyperattenuated, measuring approximately 49   Hounsfield units, which is suspicious for hemorrhage. Multiple additional   smaller cysts within the liver are unchanged from prior exam.  BILE DUCTS: Normal caliber.  GALLBLADDER: Within normal limits.  SPLEEN: Within normal limits.  PANCREAS: An uncinate process calcification appears unchanged.  ADRENALS: Within normal limits.  KIDNEYS/URETERS: Multiple bilateral renal cysts, unchanged.    BLADDER: Within normal limits.  REPRODUCTIVE ORGANS: Status post hysterectomy. No pelvic mass.    BOWEL: Mild small bowel wall thickening in the mid abdomen, most   consistent with enteritis. Appendix normal  PERITONEUM: Moderate high attenuation ascites, concerning for   hemoperitoneum.  VESSELS:  No abdominal aortic dissection. Atherosclerotic change of the   aorta and its branches.  RETROPERITONEUM: No retroperitoneal lymphadenopathy or hemorrhage.  ABDOMINAL WALL: Within normal limits.  BONES: Grade 1 anterolisthesis of L4 on L5 with degenerative lumbar   spinal changes.    IMPRESSION:   1.  10.2 cm, complex cyst with internal high attenuation concerning for   bleeding. An arterial vessel is noted inside this cystic structure   concerning for active bleeding.  2.  Moderate to large volume hemoperitoneum.  3.  Small bowel enteritis.    CT Head IMPRESSION:   No CT evidence of acute intracranial hemorrhage, extra-axial collection,   brain edema or calvarial fracture.  There are chronic infarcts in the right middle cerebral artery vascular   territory that is new since 10/08/2016 please note that CT scan cannot   exclude acute ischemia at the margins of a chronic infarct.  Follow-up   MRI can be obtained as clinically warranted.  Small chronic infarction left frontal lobe within the left middle   cerebral artery vascular territory, mild generalized cerebral volume loss   and minimal chronic microvascular ischemic disease are stable since   10/08/2016.

## 2017-08-09 LAB
ALBUMIN SERPL ELPH-MCNC: 3.3 G/DL — SIGNIFICANT CHANGE UP (ref 3.3–5)
ALP SERPL-CCNC: 68 U/L — SIGNIFICANT CHANGE UP (ref 40–120)
ALT FLD-CCNC: 28 U/L RC — SIGNIFICANT CHANGE UP (ref 10–45)
ANION GAP SERPL CALC-SCNC: 17 MMOL/L — SIGNIFICANT CHANGE UP (ref 5–17)
ANION GAP SERPL CALC-SCNC: 17 MMOL/L — SIGNIFICANT CHANGE UP (ref 5–17)
APPEARANCE UR: ABNORMAL
APTT BLD: 34.4 SEC — SIGNIFICANT CHANGE UP (ref 27.5–37.4)
APTT BLD: 34.6 SEC — SIGNIFICANT CHANGE UP (ref 27.5–37.4)
APTT BLD: 35.1 SEC — SIGNIFICANT CHANGE UP (ref 27.5–37.4)
AST SERPL-CCNC: 41 U/L — HIGH (ref 10–40)
BILIRUB DIRECT SERPL-MCNC: 1.5 MG/DL — HIGH (ref 0–0.2)
BILIRUB INDIRECT FLD-MCNC: 1.7 MG/DL — HIGH (ref 0.2–1)
BILIRUB SERPL-MCNC: 3.2 MG/DL — HIGH (ref 0.2–1.2)
BILIRUB UR-MCNC: NEGATIVE — SIGNIFICANT CHANGE UP
BUN SERPL-MCNC: 23 MG/DL — SIGNIFICANT CHANGE UP (ref 7–23)
BUN SERPL-MCNC: 27 MG/DL — HIGH (ref 7–23)
CA-I BLD-SCNC: 1.19 MMOL/L — SIGNIFICANT CHANGE UP (ref 1.12–1.3)
CALCIUM SERPL-MCNC: 8.7 MG/DL — SIGNIFICANT CHANGE UP (ref 8.4–10.5)
CALCIUM SERPL-MCNC: 9 MG/DL — SIGNIFICANT CHANGE UP (ref 8.4–10.5)
CHLORIDE SERPL-SCNC: 103 MMOL/L — SIGNIFICANT CHANGE UP (ref 96–108)
CHLORIDE SERPL-SCNC: 104 MMOL/L — SIGNIFICANT CHANGE UP (ref 96–108)
CK MB BLD-MCNC: 2.4 % — SIGNIFICANT CHANGE UP (ref 0–3.5)
CK MB CFR SERPL CALC: 2.5 NG/ML — SIGNIFICANT CHANGE UP (ref 0–3.8)
CK SERPL-CCNC: 103 U/L — SIGNIFICANT CHANGE UP (ref 25–170)
CO2 SERPL-SCNC: 21 MMOL/L — LOW (ref 22–31)
CO2 SERPL-SCNC: 21 MMOL/L — LOW (ref 22–31)
COLOR SPEC: YELLOW — SIGNIFICANT CHANGE UP
CREAT SERPL-MCNC: 1.16 MG/DL — SIGNIFICANT CHANGE UP (ref 0.5–1.3)
CREAT SERPL-MCNC: 1.21 MG/DL — SIGNIFICANT CHANGE UP (ref 0.5–1.3)
DIFF PNL FLD: ABNORMAL
GAS PNL BLDA: SIGNIFICANT CHANGE UP
GLUCOSE SERPL-MCNC: 125 MG/DL — HIGH (ref 70–99)
GLUCOSE SERPL-MCNC: 130 MG/DL — HIGH (ref 70–99)
GLUCOSE UR QL: NEGATIVE — SIGNIFICANT CHANGE UP
HCT VFR BLD CALC: 32.7 % — LOW (ref 34.5–45)
HCT VFR BLD CALC: 33.7 % — LOW (ref 34.5–45)
HCT VFR BLD CALC: 34 % — LOW (ref 34.5–45)
HCT VFR BLD CALC: 34 % — LOW (ref 34.5–45)
HGB BLD-MCNC: 11.2 G/DL — LOW (ref 11.5–15.5)
HGB BLD-MCNC: 11.3 G/DL — LOW (ref 11.5–15.5)
HGB BLD-MCNC: 11.3 G/DL — LOW (ref 11.5–15.5)
HGB BLD-MCNC: 11.4 G/DL — LOW (ref 11.5–15.5)
INR BLD: 2.71 RATIO — HIGH (ref 0.88–1.16)
INR BLD: 2.99 RATIO — HIGH (ref 0.88–1.16)
INR BLD: 3.08 RATIO — HIGH (ref 0.88–1.16)
KETONES UR-MCNC: NEGATIVE — SIGNIFICANT CHANGE UP
LEUKOCYTE ESTERASE UR-ACNC: ABNORMAL
MAGNESIUM SERPL-MCNC: 2.3 MG/DL — SIGNIFICANT CHANGE UP (ref 1.6–2.6)
MCHC RBC-ENTMCNC: 29.3 PG — SIGNIFICANT CHANGE UP (ref 27–34)
MCHC RBC-ENTMCNC: 29.6 PG — SIGNIFICANT CHANGE UP (ref 27–34)
MCHC RBC-ENTMCNC: 29.9 PG — SIGNIFICANT CHANGE UP (ref 27–34)
MCHC RBC-ENTMCNC: 30.4 PG — SIGNIFICANT CHANGE UP (ref 27–34)
MCHC RBC-ENTMCNC: 33.2 GM/DL — SIGNIFICANT CHANGE UP (ref 32–36)
MCHC RBC-ENTMCNC: 33.2 GM/DL — SIGNIFICANT CHANGE UP (ref 32–36)
MCHC RBC-ENTMCNC: 33.6 GM/DL — SIGNIFICANT CHANGE UP (ref 32–36)
MCHC RBC-ENTMCNC: 34.6 GM/DL — SIGNIFICANT CHANGE UP (ref 32–36)
MCV RBC AUTO: 88 FL — SIGNIFICANT CHANGE UP (ref 80–100)
MCV RBC AUTO: 88.2 FL — SIGNIFICANT CHANGE UP (ref 80–100)
MCV RBC AUTO: 89 FL — SIGNIFICANT CHANGE UP (ref 80–100)
MCV RBC AUTO: 89.1 FL — SIGNIFICANT CHANGE UP (ref 80–100)
NITRITE UR-MCNC: NEGATIVE — SIGNIFICANT CHANGE UP
PH UR: 8.5 — HIGH (ref 5–8)
PHOSPHATE SERPL-MCNC: 4.2 MG/DL — SIGNIFICANT CHANGE UP (ref 2.5–4.5)
PLATELET # BLD AUTO: 180 K/UL — SIGNIFICANT CHANGE UP (ref 150–400)
PLATELET # BLD AUTO: 188 K/UL — SIGNIFICANT CHANGE UP (ref 150–400)
PLATELET # BLD AUTO: 196 K/UL — SIGNIFICANT CHANGE UP (ref 150–400)
PLATELET # BLD AUTO: 208 K/UL — SIGNIFICANT CHANGE UP (ref 150–400)
POTASSIUM SERPL-MCNC: 3.8 MMOL/L — SIGNIFICANT CHANGE UP (ref 3.5–5.3)
POTASSIUM SERPL-MCNC: 3.9 MMOL/L — SIGNIFICANT CHANGE UP (ref 3.5–5.3)
POTASSIUM SERPL-SCNC: 3.8 MMOL/L — SIGNIFICANT CHANGE UP (ref 3.5–5.3)
POTASSIUM SERPL-SCNC: 3.9 MMOL/L — SIGNIFICANT CHANGE UP (ref 3.5–5.3)
PROT SERPL-MCNC: 6.3 G/DL — SIGNIFICANT CHANGE UP (ref 6–8.3)
PROT UR-MCNC: 150 MG/DL
PROTHROM AB SERPL-ACNC: 30.1 SEC — HIGH (ref 9.8–12.7)
PROTHROM AB SERPL-ACNC: 33.3 SEC — HIGH (ref 9.8–12.7)
PROTHROM AB SERPL-ACNC: 34.3 SEC — HIGH (ref 9.8–12.7)
RBC # BLD: 3.72 M/UL — LOW (ref 3.8–5.2)
RBC # BLD: 3.81 M/UL — SIGNIFICANT CHANGE UP (ref 3.8–5.2)
RBC # BLD: 3.82 M/UL — SIGNIFICANT CHANGE UP (ref 3.8–5.2)
RBC # BLD: 3.82 M/UL — SIGNIFICANT CHANGE UP (ref 3.8–5.2)
RBC # FLD: 14.6 % — HIGH (ref 10.3–14.5)
RBC # FLD: 14.7 % — HIGH (ref 10.3–14.5)
RBC # FLD: 14.8 % — HIGH (ref 10.3–14.5)
RBC # FLD: 15.1 % — HIGH (ref 10.3–14.5)
SODIUM SERPL-SCNC: 141 MMOL/L — SIGNIFICANT CHANGE UP (ref 135–145)
SODIUM SERPL-SCNC: 142 MMOL/L — SIGNIFICANT CHANGE UP (ref 135–145)
SP GR SPEC: >1.03 — HIGH (ref 1.01–1.02)
TROPONIN T SERPL-MCNC: 0.06 NG/ML — SIGNIFICANT CHANGE UP (ref 0–0.06)
UROBILINOGEN FLD QL: NEGATIVE — SIGNIFICANT CHANGE UP
WBC # BLD: 17.7 K/UL — HIGH (ref 3.8–10.5)
WBC # BLD: 18.2 K/UL — HIGH (ref 3.8–10.5)
WBC # BLD: 20.3 K/UL — HIGH (ref 3.8–10.5)
WBC # BLD: 23.5 K/UL — HIGH (ref 3.8–10.5)
WBC # FLD AUTO: 17.7 K/UL — HIGH (ref 3.8–10.5)
WBC # FLD AUTO: 18.2 K/UL — HIGH (ref 3.8–10.5)
WBC # FLD AUTO: 20.3 K/UL — HIGH (ref 3.8–10.5)
WBC # FLD AUTO: 23.5 K/UL — HIGH (ref 3.8–10.5)

## 2017-08-09 PROCEDURE — 99231 SBSQ HOSP IP/OBS SF/LOW 25: CPT

## 2017-08-09 PROCEDURE — 71010: CPT | Mod: 26

## 2017-08-09 PROCEDURE — 99024 POSTOP FOLLOW-UP VISIT: CPT

## 2017-08-09 PROCEDURE — 93010 ELECTROCARDIOGRAM REPORT: CPT

## 2017-08-09 PROCEDURE — 99291 CRITICAL CARE FIRST HOUR: CPT

## 2017-08-09 RX ORDER — FUROSEMIDE 40 MG
20 TABLET ORAL ONCE
Qty: 0 | Refills: 0 | Status: COMPLETED | OUTPATIENT
Start: 2017-08-09 | End: 2017-08-09

## 2017-08-09 RX ORDER — FUROSEMIDE 40 MG
80 TABLET ORAL ONCE
Qty: 0 | Refills: 0 | Status: COMPLETED | OUTPATIENT
Start: 2017-08-09 | End: 2017-08-10

## 2017-08-09 RX ORDER — METOPROLOL TARTRATE 50 MG
25 TABLET ORAL EVERY 12 HOURS
Qty: 0 | Refills: 0 | Status: DISCONTINUED | OUTPATIENT
Start: 2017-08-09 | End: 2017-08-09

## 2017-08-09 RX ORDER — METOPROLOL TARTRATE 50 MG
50 TABLET ORAL EVERY 12 HOURS
Qty: 0 | Refills: 0 | Status: DISCONTINUED | OUTPATIENT
Start: 2017-08-09 | End: 2017-08-14

## 2017-08-09 RX ORDER — PHYTONADIONE (VIT K1) 5 MG
10 TABLET ORAL ONCE
Qty: 0 | Refills: 0 | Status: COMPLETED | OUTPATIENT
Start: 2017-08-09 | End: 2017-08-09

## 2017-08-09 RX ORDER — DEXTROSE MONOHYDRATE, SODIUM CHLORIDE, AND POTASSIUM CHLORIDE 50; .745; 4.5 G/1000ML; G/1000ML; G/1000ML
1000 INJECTION, SOLUTION INTRAVENOUS
Qty: 0 | Refills: 0 | Status: DISCONTINUED | OUTPATIENT
Start: 2017-08-09 | End: 2017-08-12

## 2017-08-09 RX ORDER — METOPROLOL TARTRATE 50 MG
25 TABLET ORAL ONCE
Qty: 0 | Refills: 0 | Status: COMPLETED | OUTPATIENT
Start: 2017-08-09 | End: 2017-08-09

## 2017-08-09 RX ORDER — POTASSIUM CHLORIDE 20 MEQ
20 PACKET (EA) ORAL ONCE
Qty: 0 | Refills: 0 | Status: COMPLETED | OUTPATIENT
Start: 2017-08-09 | End: 2017-08-09

## 2017-08-09 RX ORDER — PROTHROMBIN COMPLEX CONCENTRATE (HUMAN) 25.5; 16.5; 24; 22; 22; 26 [IU]/ML; [IU]/ML; [IU]/ML; [IU]/ML; [IU]/ML; [IU]/ML
1500 POWDER, FOR SOLUTION INTRAVENOUS ONCE
Qty: 1500 | Refills: 0 | Status: COMPLETED | OUTPATIENT
Start: 2017-08-09 | End: 2017-08-09

## 2017-08-09 RX ORDER — ASPIRIN/CALCIUM CARB/MAGNESIUM 324 MG
81 TABLET ORAL DAILY
Qty: 0 | Refills: 0 | Status: DISCONTINUED | OUTPATIENT
Start: 2017-08-09 | End: 2017-08-21

## 2017-08-09 RX ORDER — FUROSEMIDE 40 MG
40 TABLET ORAL ONCE
Qty: 0 | Refills: 0 | Status: COMPLETED | OUTPATIENT
Start: 2017-08-09 | End: 2017-08-09

## 2017-08-09 RX ORDER — FENTANYL CITRATE 50 UG/ML
1 INJECTION INTRAVENOUS
Qty: 5000 | Refills: 0 | Status: DISCONTINUED | OUTPATIENT
Start: 2017-08-09 | End: 2017-08-10

## 2017-08-09 RX ADMIN — DEXTROSE MONOHYDRATE, SODIUM CHLORIDE, AND POTASSIUM CHLORIDE 30 MILLILITER(S): 50; .745; 4.5 INJECTION, SOLUTION INTRAVENOUS at 11:57

## 2017-08-09 RX ADMIN — Medication 40 MILLIGRAM(S): at 18:30

## 2017-08-09 RX ADMIN — Medication 10 MILLIGRAM(S): at 11:58

## 2017-08-09 RX ADMIN — Medication 5 MILLIGRAM(S): at 05:15

## 2017-08-09 RX ADMIN — Medication 5 MILLIGRAM(S): at 00:48

## 2017-08-09 RX ADMIN — Medication 25 MILLIGRAM(S): at 16:29

## 2017-08-09 RX ADMIN — Medication 20 MILLIGRAM(S): at 11:57

## 2017-08-09 RX ADMIN — PANTOPRAZOLE SODIUM 40 MILLIGRAM(S): 20 TABLET, DELAYED RELEASE ORAL at 11:57

## 2017-08-09 RX ADMIN — PROTHROMBIN COMPLEX CONCENTRATE (HUMAN) 400 INTERNATIONAL UNIT(S): 25.5; 16.5; 24; 22; 22; 26 POWDER, FOR SOLUTION INTRAVENOUS at 18:15

## 2017-08-09 RX ADMIN — Medication 81 MILLIGRAM(S): at 11:58

## 2017-08-09 RX ADMIN — Medication 100 MILLIEQUIVALENT(S): at 17:15

## 2017-08-09 NOTE — DIETITIAN INITIAL EVALUATION ADULT. - NS FNS REASON FOR WEIGHT CHANG
Pt noted with weight shifts; previously reported UBW > 1 year ago ~ 170 pounds with drop to 155 pounds S/P Breast CA and chemo. Pt noted at 148 pounds on 5/23/17 admit, dosing wt 166 pounds this admit.

## 2017-08-09 NOTE — DIETITIAN INITIAL EVALUATION ADULT. - ORAL INTAKE PTA
Unable to assess po intake PTA. Per chart review, pt takes KCL with lasix, Centrum MVI and Omega-3 supplements; reports NKFA.

## 2017-08-09 NOTE — PROGRESS NOTE ADULT - ATTENDING COMMENTS
Dr. Burger (Attending Physician)  Ho AFib on Rivoraxaban pw Hemorrhagic Shock from active extravasation from liver mass s/p 4 RBC, 8 FFP, 2500 PCC, 10 mg Vit K, 1 Plts.  Now HD stable off pressors with stable h/h, afib HR 90s-110s. Start metoprolol 25 mg for rate control. INR remains elevated at 2.5 will give additional dose of vitamin k. UO decreasing but takes lasix at home.  IVC dilated with less than 50% collapse.  Bilateral pleural effusions and ascites. Will give lasix 20 mg IV.

## 2017-08-09 NOTE — PROGRESS NOTE ADULT - ASSESSMENT
84 year old female with bleeding 10.2 liver cyst s/p fall now s/p IR embolization    Neuro: post-procedure pain control  -Fentanyl gtt    Resp: Intubated for procedure  -f/u CXR, ABG  -possible SBT    CV: Hemorrhagic shock class I s/p resuscitation; h/o PAF on Xarelto, CHF, CAD s/p CABG, MVR s/p clipping, PPM, HTN, HLD  -Hemodynamic monitoring  -LR @ 75mL/hr      GI: h/o GERD, enteritis on CT  -NPO/NGT  -Protonix     /Renal: MYA  -LR @ 75mL/hr  -f/u repeat labs  -strict I's/O's    Heme: Hemorrhagic shock s/p IR embolization liver cyst  -CBC, coags q6hrs  -SCDs    ID: Afebrile  -Trend WBC    Endo: h/o DM  -ISS    Lines:   -L fem A-line, Intro  -R fem IR sheath     Dispo: Full code, appropriate for SICU care.     Melba Chong PGY 2 84 year old female with bleeding 10.2cm liver cyst s/p fall now s/p IR embolization    Neuro: post-procedure pain control  -Fentanyl gtt    Resp: Intubated for procedure  -f/u CXR, ABG  -possible SBT    CV: Hemorrhagic shock class I s/p resuscitation; h/o PAF on Xarelto, CHF, CAD s/p CABG, MVR s/p clipping, PPM, HTN, HLD  -Hemodynamic monitoring  -LR @ 75mL/hr      GI: h/o GERD, enteritis on CT  -NPO/NGT  -Protonix     /Renal: MYA  -LR @ 75mL/hr  -f/u repeat labs  -strict I's/O's    Heme: Hemorrhagic shock s/p IR embolization liver cyst  -CBC, coags q6hrs  -SCDs  -Can d/c fem sheath once INR is less than 1.5    ID: Afebrile  -Trend WBC    Endo: h/o DM  -ISS    Dispo: Full code, appropriate for SICU care.     Melba Chong PGY 2 84 year old female with bleeding 10.2cm liver cyst s/p fall now s/p IR embolization    Neuro: post-procedure pain control  -Fentanyl gtt    Resp: Acute respiratory failure Intubated for procedure  -CXR and ABG normal.  -SBT today    CV: Hemorrhagic shock class I s/p resuscitation; h/o PAF on Xarelto, CHF, CAD s/p CABG, MVR s/p clipping, PPM, HTN, HLD  -Hemodynamic monitoring  -LR @ 75mL/hr      GI: h/o GERD, enteritis on CT  -NPO/NGT  -Protonix     /Renal: CKD Stage 3 with MYA  -start maintenance fluids  -f/u repeat labs  -strict I's/O's    Heme: Hemorrhagic shock s/p IR embolization liver cyst  -CBC, coags q8hrs  -SCDs  -Can d/c fem sheath once INR is less than 1.5    ID: Afebrile  -Trend WBC    Endo: h/o DM  -ISS    Dispo: Full code, appropriate for SICU care.     Melba Chong PGY 2

## 2017-08-09 NOTE — PROVIDER CONTACT NOTE (OTHER) - SITUATION
UO remains persistently low for two hours. UO 20cc/hr at 0700, UO 15cc/hr at 0800. Urine dark orange, concentrated

## 2017-08-09 NOTE — DIETITIAN INITIAL EVALUATION ADULT. - ENERGY NEEDS
ht: 5 feet 6 inches, wt: xxx pounds, BMI: xx Kg/m2, IBW: xxx pounds (+/- 10%), x% IBW. Edema: ; Skin: no pressure injuries. ht: 5 feet 6 inches, wt: 166 pounds, BMI: 26.9 Kg/m2, IBW: 130 pounds (+/- 10%), 128% IBW. Edema: 1+ generalized; Skin: no pressure injuries.

## 2017-08-09 NOTE — PROVIDER CONTACT NOTE (CHANGE IN STATUS NOTIFICATION) - SITUATION
Pt with 6 beats of vtach while on CPAP trial. Pt on 5/5 for 20 minutes became tachypneic, tachycardic to 130's/140's.

## 2017-08-09 NOTE — DIETITIAN INITIAL EVALUATION ADULT. - OTHER INFO
Nutrition Assessment warranted for length of stay in SICU. Pt with h/o CHF, CAD, CABG, PPM, MVR S/P clipping, HTN, HLD, DM who presented to ED after fall from standing x 2 in past 24hrs. Pt now S/P embolization of a large hemorrhagic liver mass; remains intubated and sedated. Nutrition Assessment warranted for length of stay in SICU. Pt with h/o CHF, CAD, CABG, PPM, MVR S/P clipping, HTN, HLD, DM who presented to ED after fall from standing x 2 in past 24hrs. Pt now S/P embolization of a large hemorrhagic liver mass; remains intubated and sedated. Per team, MYA is resolving; pt at risk for ileus secondary to hemoperitoneum, plan to remain NPO for now.

## 2017-08-09 NOTE — CHART NOTE - NSCHARTNOTEFT_GEN_A_CORE
Called to see pt for right groin sheath removal. K centra given in SICU. Removed sheath after 5 minutes under sterile condition. Held pressure for 20 minutes. Bleeding resolved. Pt tolerated procedure well Called to see pt for right groin sheath removal. K centra given in SICU. Removed sheath after 5 minutes under sterile condition. Held pressure for 20 minutes. Bleeding resolved. Pt tolerated procedure well. D/w Dr. Russo and DR. silveira

## 2017-08-09 NOTE — PROVIDER CONTACT NOTE (CHANGE IN STATUS NOTIFICATION) - ACTION/TREATMENT ORDERED:
PA aware, MD Burger aware. Back to PRVC on vent. Stat ecg, stat cardiac enzymes, BMP. Fent gtt restarted.

## 2017-08-09 NOTE — PROVIDER CONTACT NOTE (CHANGE IN STATUS NOTIFICATION) - BACKGROUND
Pt admitted as level 1 trauma s/p fall with hemoperitoneum. Pt s/p IR embolization of R hepatic tumor rupture. PMH: afib on xarelto, CAD, HLD, HTN, mitral regurg, heart failure, breast CA, AICD

## 2017-08-09 NOTE — DIETITIAN INITIAL EVALUATION ADULT. - NS AS NUTRI INTERV MEALS SNACK
Pending extubation, recommend clear liquids; advance as tolerated to Consistent Carbohydrate diet/Carbohydrate - modified diet

## 2017-08-09 NOTE — DIETITIAN INITIAL EVALUATION ADULT. - ETIOLOGY
inability to consume sufficient protein/energy S/P embolization of a large hemorrhagic liver mass with intubation

## 2017-08-09 NOTE — DIETITIAN INITIAL EVALUATION ADULT. - FACTORS AFF FOOD INTAKE
other (specify)/NPO, intubated, sedated' 24-hour NGT output = 100ml (8/9), 50ml (8/8). No BM or GI signs/symptoms noted. other (specify)/NPO, intubated, sedated; 24-hour NGT output = 100ml (8/9), 50ml (8/8). No BM or GI signs/symptoms noted.

## 2017-08-09 NOTE — PROGRESS NOTE ADULT - SUBJECTIVE AND OBJECTIVE BOX
HISTORY OF PRESENT ILLNESS:  ELY WOOTEN is a 84y Female with h/o CHF, CAD s/p CABG, A.fib on Xarelto, PPM, MVR s/p clipping, HTN, HLD, DM who presented to ED after fall from standing x 2 in past 24hrs.  Pt denies LOC, admits to SOB and abdominal pain.  After +FAST in ED, pt underwent CTA which showed a 10.2cm complex liver cyst with high attenuation, concerning for active bleed. Pt taken to IR and underwent embolization of 4 vessels leading to cyst.  While in ED pt was tachycardic to 120s and received 1L bolus, 2 units PRBC, 2 FFP, 1500 K-centra and 10mg Vit K.  Emergent L femoral Introducer and A-line were placed.  In IR patient was intubated, briefly on Vitaly and received 4L crystalloid, 2 units PRBC, 6 FFP, 1 pack plts, and additional 1000 K-centra.  She underwent embolization of a large hemorrhagic liver mass. She was transferred intubated and sedated to the SICU.     24 Hour Events; She had an NGT placed for gastric decompression. Her hematocrits have continue to be stable, ranging between 31-32 since arrival in the SICU. HISTORY OF PRESENT ILLNESS:  ELY WOOTEN is a 84y Female with h/o CHF, CAD s/p CABG, A.fib on Xarelto, PPM, MVR s/p clipping, HTN, HLD, DM who presented to ED after fall from standing x 2 in past 24hrs.  Pt denies LOC, admits to SOB and abdominal pain.  After +FAST in ED, pt underwent CTA which showed a 10.2cm complex liver cyst with high attenuation, concerning for active bleed. Pt taken to IR and underwent embolization of 4 vessels leading to cyst.  While in ED pt was tachycardic to 120s and received 1L bolus, 2 units PRBC, 2 FFP, 1500 K-centra and 10mg Vit K.  Emergent L femoral Introducer and A-line were placed.  In IR patient was intubated, briefly on Vitaly and received 4L crystalloid, 2 units PRBC, 6 FFP, 1 pack plts, and additional 1000 K-centra.  She underwent embolization of a large hemorrhagic liver mass. She was transferred intubated and sedated to the SICU.     24 Hour Events; She had an NGT placed for gastric decompression. Her hematocrits have continue to be stable, ranging between 31-32 since arrival in the SICU.     SUBJECTIVE/ROS:  [x] A ten-point review of systems was otherwise negative except as noted.  [ ] Due to altered mental status/intubation, subjective information were not able to be obtained from the patient. History was obtained, to the extent possible, from review of the chart and collateral sources of information.      NEURO  Exam: AOx3  Meds: fentaNYL   Infusion 1 MICROgram(s)/kG/Hr IV Continuous <Continuous>  [x] Adequacy of sedation and pain control has been assessed and adjusted      RESPIRATORY  RR: 18 (08-09-17 @ 05:00) (15 - 24)  SpO2: 100% (08-09-17 @ 05:00) (99% - 100%)  Wt(kg): --  Exam: unlabored, clear to auscultation bilaterally  Mechanical Ventilation: Mode: AC/ CMV (Assist Control/ Continuous Mandatory Ventilation), RR (machine): 18, RR (patient): 20, TV (machine): 450, FiO2: 40, PEEP: 5, ITime: 1, MAP: 9, PIP: 16  ABG - ( 09 Aug 2017 02:24 )  pH: 7.42  /  pCO2: 35    /  pO2: 108   / HCO3: 22    / Base Excess: -1.1  /  SaO2: 98      [x ] Extubation Readiness Assessed  Meds:       CARDIOVASCULAR  HR: 121 (08-09-17 @ 05:00) (93 - 128)  BP: 150/87 (08-08-17 @ 19:00) (78/56 - 158/109)  BP(mean): 112 (08-08-17 @ 19:00) (112 - 128)  ABP: 126/76 (08-09-17 @ 05:00) (124/73 - 161/93)  ABP(mean): 95 (08-09-17 @ 05:00) (91 - 122)  Wt(kg): --  CVP(cm H2O): --  VBG - ( 08 Aug 2017 07:29 )  pH: 7.41  /  pCO2: 44    /  pO2: 21    / HCO3: 27    / Base Excess: 2.5   /  SaO2: 23     Lactate: 2.9    Exam:  Cardiac Rhythm:  Perfusion     [x ]Adequate   [ ]Inadequate  Mentation   [ ]Normal       [ x]Reduced  Extremities  [ x]Warm         [ ]Cool  Volume Status [ ]Hypervolemic [ ]Euvolemic [x ]Hypovolemic  Meds: metoprolol Injectable 5 milliGRAM(s) IV Push every 6 hours        GI/NUTRITION  Exam: softly distended, nontender  Diet: NPO  Meds: pantoprazole  Injectable 40 milliGRAM(s) IV Push daily      GENITOURINARY  I&O's Detail    08-08 @ 07:01  -  08-09 @ 05:47  --------------------------------------------------------  IN:    fentaNYL  Infusion: 34.2 mL    IV PiggyBack: 50 mL    Lactated Ringers IV Bolus: 1000 mL    lactated ringers.: 1050 mL  Total IN: 2134.2 mL    OUT:    Indwelling Catheter - Urethral: 460 mL    Nasoenteral Tube: 100 mL  Total OUT: 560 mL    Total NET: 1574.2 mL    Weight (kg): 75.5 (08-08 @ 13:58)  08-09    142  |  104  |  23  ----------------------------<  125<H>  3.8   |  21<L>  |  1.16    Ca    9.0      09 Aug 2017 02:26  Phos  4.2     08-09  Mg     2.3     08-09    TPro  6.3  /  Alb  3.3  /  TBili  3.2<H>  /  DBili  1.5<H>  /  AST  41<H>  /  ALT  28  /  AlkPhos  68  08-09    [x ] Reyez catheter, indication: critically ill  Meds: lactated ringers. 1000 milliLiter(s) IV Continuous <Continuous>    HEMATOLOGIC  Meds: prothrombin complex concentrate Injectable (KCENTRA) 500 International Unit(s) IV Push once  prothrombin complex concentrate Injectable (KCENTRA) 500 International Unit(s) IV Push once    [x] VTE Prophylaxis                        11.3   17.7  )-----------( 180      ( 09 Aug 2017 02:26 )             32.7     PT/INR - ( 09 Aug 2017 02:26 )   PT: 30.1 sec;   INR: 2.71 ratio         PTT - ( 09 Aug 2017 02:26 )  PTT:34.6 sec  Transfusion     [ ] PRBC   [ ] Platelets   [ ] FFP   [ ] Cryoprecipitate      INFECTIOUS DISEASES  T(C): 36.5 (08-09-17 @ 03:00), Max: 36.5 (08-09-17 @ 03:00)  Wt(kg): --  WBC Count: 17.7 K/uL (08-09 @ 02:26)  WBC Count: 17.0 K/uL (08-08 @ 22:03)  WBC Count: 15.1 K/uL (08-08 @ 18:14)  WBC Count: 15.4 K/uL (08-08 @ 14:41)  WBC Count: 13.1 K/uL (08-08 @ 10:42)  WBC Count: 13.3 K/uL (08-08 @ 06:16)      ENDOCRINE  Capillary Blood Glucose  116 (09 Aug 2017 00:00)  145 (08 Aug 2017 17:00)    Meds: insulin lispro (HumaLOG) corrective regimen sliding scale   SubCutaneous every 6 hours    ACCESS DEVICES:  [ x] Peripheral IV  [ ] Central Venous Line	[ x] R	[ ] L	[ ] IJ	[ x] Fem	[ ] SC	Placed:   [x ] Arterial Line		[ ] R	[x] L	[ x] Fem	[ ] Rad	[ ] Ax	Placed:   [ ] PICC:					[ ] Mediport  [x ] Urinary Catheter, Date Placed: 8/8  [ ] Necessity of urinary, arterial, and venous catheters discussed    OTHER MEDICATIONS:      CODE STATUS: Full    IMAGING: CXR pending HISTORY OF PRESENT ILLNESS:  ELY WOOTEN is a 84y Female with h/o CHF, CAD s/p CABG, A.fib on Xarelto, PPM, MVR s/p clipping, HTN, HLD, DM who presented to ED after fall from standing x 2 in past 24hrs.  Pt denies LOC, admits to SOB and abdominal pain.  After +FAST in ED, pt underwent CTA which showed a 10.2cm complex liver cyst with high attenuation, concerning for active bleed. Pt taken to IR and underwent embolization of 4 vessels leading to cyst.  While in ED pt was tachycardic to 120s and received 1L bolus, 2 units PRBC, 2 FFP, 1500 K-centra and 10mg Vit K.  Emergent L femoral Introducer and A-line were placed.  In IR patient was intubated, briefly on Vitaly and received 4L crystalloid, 2 units PRBC, 6 FFP, 1 pack plts, and additional 1000 K-centra.  She underwent embolization of a large hemorrhagic liver mass. She was transferred intubated and sedated to the SICU.     24 Hour Events; She had an NGT placed for gastric decompression. Her hematocrits have continue to be stable, ranging between 31-32 since arrival in the SICU. Her urine output briefly dropped to 10-15ccs per hour, prompting 2 LR boluses, to which she responded appropriately.    SUBJECTIVE/ROS:  [x] A ten-point review of systems was otherwise negative except as noted.  [ ] Due to altered mental status/intubation, subjective information were not able to be obtained from the patient. History was obtained, to the extent possible, from review of the chart and collateral sources of information.      NEURO  Exam: AOx3  Meds: fentaNYL   Infusion 1 MICROgram(s)/kG/Hr IV Continuous <Continuous>  [x] Adequacy of sedation and pain control has been assessed and adjusted      RESPIRATORY  RR: 18 (08-09-17 @ 05:00) (15 - 24)  SpO2: 100% (08-09-17 @ 05:00) (99% - 100%)  Wt(kg): --  Exam: unlabored, clear to auscultation bilaterally  Mechanical Ventilation: Mode: AC/ CMV (Assist Control/ Continuous Mandatory Ventilation), RR (machine): 18, RR (patient): 20, TV (machine): 450, FiO2: 40, PEEP: 5, ITime: 1, MAP: 9, PIP: 16  ABG - ( 09 Aug 2017 02:24 )  pH: 7.42  /  pCO2: 35    /  pO2: 108   / HCO3: 22    / Base Excess: -1.1  /  SaO2: 98      [x ] Extubation Readiness Assessed  Meds:       CARDIOVASCULAR  HR: 121 (08-09-17 @ 05:00) (93 - 128)  BP: 150/87 (08-08-17 @ 19:00) (78/56 - 158/109)  BP(mean): 112 (08-08-17 @ 19:00) (112 - 128)  ABP: 126/76 (08-09-17 @ 05:00) (124/73 - 161/93)  ABP(mean): 95 (08-09-17 @ 05:00) (91 - 122)  Wt(kg): --  CVP(cm H2O): --  VBG - ( 08 Aug 2017 07:29 )  pH: 7.41  /  pCO2: 44    /  pO2: 21    / HCO3: 27    / Base Excess: 2.5   /  SaO2: 23     Lactate: 2.9    Exam:  Cardiac Rhythm:  Perfusion     [x ]Adequate   [ ]Inadequate  Mentation   [ ]Normal       [ x]Reduced  Extremities  [ x]Warm         [ ]Cool  Volume Status [ ]Hypervolemic [ ]Euvolemic [x ]Hypovolemic  Meds: metoprolol Injectable 5 milliGRAM(s) IV Push every 6 hours        GI/NUTRITION  Exam: softly distended, nontender  Diet: NPO  Meds: pantoprazole  Injectable 40 milliGRAM(s) IV Push daily      GENITOURINARY  I&O's Detail    08 Aug 2017 07:01  -  09 Aug 2017 07:00  --------------------------------------------------------  IN:    fentaNYL  Infusion: 41.8 mL    IV PiggyBack: 50 mL    Lactated Ringers IV Bolus: 1000 mL    lactated ringers.: 1200 mL  Total IN: 2291.8 mL    OUT:    Indwelling Catheter - Urethral: 505 mL    Nasoenteral Tube: 100 mL  Total OUT: 605 mL    Total NET: 1686.8 mL      09 Aug 2017 07:01  -  09 Aug 2017 08:17  --------------------------------------------------------  IN:    fentaNYL  Infusion: 3.8 mL    lactated ringers.: 75 mL  Total IN: 78.8 mL    OUT:    Indwelling Catheter - Urethral: 15 mL  Total OUT: 15 mL    Total NET: 63.8 mL            142  |  104  |  23  ----------------------------<  125<H>  3.8   |  21<L>  |  1.16    Ca    9.0      09 Aug 2017 02:26  Phos  4.2     08-09  Mg     2.3     08-09    TPro  6.3  /  Alb  3.3  /  TBili  3.2<H>  /  DBili  1.5<H>  /  AST  41<H>  /  ALT  28  /  AlkPhos  68  08-09    [x ] Reyez catheter, indication: critically ill  Meds: lactated ringers. 1000 milliLiter(s) IV Continuous <Continuous>    HEMATOLOGIC  Meds: prothrombin complex concentrate Injectable (KCENTRA) 500 International Unit(s) IV Push once  prothrombin complex concentrate Injectable (KCENTRA) 500 International Unit(s) IV Push once    [x] VTE Prophylaxis                        11.3   17.7  )-----------( 180      ( 09 Aug 2017 02:26 )             32.7     PT/INR - ( 09 Aug 2017 02:26 )   PT: 30.1 sec;   INR: 2.71 ratio         PTT - ( 09 Aug 2017 02:26 )  PTT:34.6 sec  Transfusion     [ ] PRBC   [ ] Platelets   [ ] FFP   [ ] Cryoprecipitate      INFECTIOUS DISEASES  T(C): 36.5 (08-09-17 @ 03:00), Max: 36.5 (08-09-17 @ 03:00)  Wt(kg): --  WBC Count: 17.7 K/uL (08-09 @ 02:26)  WBC Count: 17.0 K/uL (08-08 @ 22:03)  WBC Count: 15.1 K/uL (08-08 @ 18:14)  WBC Count: 15.4 K/uL (08-08 @ 14:41)  WBC Count: 13.1 K/uL (08-08 @ 10:42)  WBC Count: 13.3 K/uL (08-08 @ 06:16)      ENDOCRINE  Capillary Blood Glucose  116 (09 Aug 2017 00:00)  145 (08 Aug 2017 17:00)    Meds: insulin lispro (HumaLOG) corrective regimen sliding scale   SubCutaneous every 6 hours    ACCESS DEVICES:  [ x] Peripheral IV  [ ] Central Venous Line	[ x] R	[ ] L	[ ] IJ	[ x] Fem	[ ] SC	Placed:   [x ] Arterial Line		[ ] R	[x] L	[ x] Fem	[ ] Rad	[ ] Ax	Placed:   [ ] PICC:					[ ] Mediport  [x ] Urinary Catheter, Date Placed: 8/8  [ ] Necessity of urinary, arterial, and venous catheters discussed    OTHER MEDICATIONS:      CODE STATUS: Full    IMAGING: CXR pending

## 2017-08-09 NOTE — PROGRESS NOTE ADULT - SUBJECTIVE AND OBJECTIVE BOX
Interventional Radiology    S/P Celiac angiogram and embolization of right hepatic mass, POD # 1. Last blood transfusions were on 8/8/17 in AM.      Vital Signs Last 24 Hrs  T(C): 36.6 (09 Aug 2017 07:00), Max: 36.6 (09 Aug 2017 07:00)  T(F): 97.9 (09 Aug 2017 07:00), Max: 97.9 (09 Aug 2017 07:00)  HR: 121 (09 Aug 2017 10:00) (93 - 121)  BP: 139/81 (09 Aug 2017 07:00) (139/81 - 158/109)  BP(mean): 101 (09 Aug 2017 07:00) (101 - 128)  RR: 18 (09 Aug 2017 10:00) (15 - 24)  SpO2: 100% (09 Aug 2017 10:00) (95% - 100%)    General Appearance: Intubated, awake, in NAD    Procedure Site (RIGHT Groin): No bleeding or hematoma around sheath.  sheath connected to drip.    LABS                        11.2   18.2  )-----------( 188      ( 09 Aug 2017 06:11 )             33.7     08-09    142  |  104  |  23  ----------------------------<  125<H>  3.8   |  21<L>  |  1.16    Ca    9.0      09 Aug 2017 02:26  Phos  4.2     08-09  Mg     2.3     08-09    TPro  6.3  /  Alb  3.3  /  TBili  3.2<H>  /  DBili  1.5<H>  /  AST  41<H>  /  ALT  28  /  AlkPhos  68  08-09    PT/INR - ( 09 Aug 2017 06:11 )   PT: 33.3 sec;   INR: 2.99 ratio         PTT - ( 09 Aug 2017 06:11 )  PTT:34.4 sec    A/P   84y Female with PMH of Heart failure, Type 2 diabetes mellitus, Dyspnea, Hypertension, HLD (hyperlipidemia), PAF (paroxysmal atrial fibrillation), CAD (coronary artery disease), Cataract, Gout, GERD (gastroesophageal reflux disease) with Breast cancer with  Symptomatic liver mass hemorrhage s/p embolization of right hepatic mass    Continue care as per primary team  Continue serial monitor CBC.    RIGHT groin sheath can be removed when the INR is 1.5 or less  Contact IR as needed at ext [phone]    FAHEEM Tripp  [facility] [id number]  Ext [phone]

## 2017-08-10 DIAGNOSIS — R13.10 DYSPHAGIA, UNSPECIFIED: ICD-10-CM

## 2017-08-10 LAB
ALBUMIN SERPL ELPH-MCNC: 2.7 G/DL — LOW (ref 3.3–5)
ALP SERPL-CCNC: 71 U/L — SIGNIFICANT CHANGE UP (ref 40–120)
ALT FLD-CCNC: 41 U/L RC — SIGNIFICANT CHANGE UP (ref 10–45)
ANION GAP SERPL CALC-SCNC: 16 MMOL/L — SIGNIFICANT CHANGE UP (ref 5–17)
APTT BLD: 35.2 SEC — SIGNIFICANT CHANGE UP (ref 27.5–37.4)
APTT BLD: 36 SEC — SIGNIFICANT CHANGE UP (ref 27.5–37.4)
APTT BLD: 45.4 SEC — HIGH (ref 27.5–37.4)
AST SERPL-CCNC: 77 U/L — HIGH (ref 10–40)
BILIRUB DIRECT SERPL-MCNC: 1.4 MG/DL — HIGH (ref 0–0.2)
BILIRUB INDIRECT FLD-MCNC: 0.9 MG/DL — SIGNIFICANT CHANGE UP (ref 0.2–1)
BILIRUB SERPL-MCNC: 2.3 MG/DL — HIGH (ref 0.2–1.2)
BUN SERPL-MCNC: 32 MG/DL — HIGH (ref 7–23)
CALCIUM SERPL-MCNC: 8.5 MG/DL — SIGNIFICANT CHANGE UP (ref 8.4–10.5)
CHLORIDE SERPL-SCNC: 103 MMOL/L — SIGNIFICANT CHANGE UP (ref 96–108)
CO2 SERPL-SCNC: 19 MMOL/L — LOW (ref 22–31)
CREAT ?TM UR-MCNC: 110 MG/DL — SIGNIFICANT CHANGE UP
CREAT SERPL-MCNC: 1.54 MG/DL — HIGH (ref 0.5–1.3)
GAS PNL BLDA: SIGNIFICANT CHANGE UP
GAS PNL BLDA: SIGNIFICANT CHANGE UP
GLUCOSE SERPL-MCNC: 151 MG/DL — HIGH (ref 70–99)
HCT VFR BLD CALC: 33.1 % — LOW (ref 34.5–45)
HCT VFR BLD CALC: 33.6 % — LOW (ref 34.5–45)
HCT VFR BLD CALC: 34.2 % — LOW (ref 34.5–45)
HGB BLD-MCNC: 10.9 G/DL — LOW (ref 11.5–15.5)
HGB BLD-MCNC: 11.1 G/DL — LOW (ref 11.5–15.5)
HGB BLD-MCNC: 11.5 G/DL — SIGNIFICANT CHANGE UP (ref 11.5–15.5)
INR BLD: 1.89 RATIO — HIGH (ref 0.88–1.16)
INR BLD: 2.03 RATIO — HIGH (ref 0.88–1.16)
INR BLD: 2.45 RATIO — HIGH (ref 0.88–1.16)
MAGNESIUM SERPL-MCNC: 2.2 MG/DL — SIGNIFICANT CHANGE UP (ref 1.6–2.6)
MCHC RBC-ENTMCNC: 29.8 PG — SIGNIFICANT CHANGE UP (ref 27–34)
MCHC RBC-ENTMCNC: 29.9 PG — SIGNIFICANT CHANGE UP (ref 27–34)
MCHC RBC-ENTMCNC: 29.9 PG — SIGNIFICANT CHANGE UP (ref 27–34)
MCHC RBC-ENTMCNC: 32.6 GM/DL — SIGNIFICANT CHANGE UP (ref 32–36)
MCHC RBC-ENTMCNC: 33.5 GM/DL — SIGNIFICANT CHANGE UP (ref 32–36)
MCHC RBC-ENTMCNC: 33.5 GM/DL — SIGNIFICANT CHANGE UP (ref 32–36)
MCV RBC AUTO: 89.3 FL — SIGNIFICANT CHANGE UP (ref 80–100)
MCV RBC AUTO: 89.4 FL — SIGNIFICANT CHANGE UP (ref 80–100)
MCV RBC AUTO: 91.4 FL — SIGNIFICANT CHANGE UP (ref 80–100)
NT-PROBNP SERPL-SCNC: HIGH PG/ML (ref 0–300)
OSMOLALITY UR: 406 MOS/KG — SIGNIFICANT CHANGE UP (ref 300–900)
PHOSPHATE SERPL-MCNC: 4.1 MG/DL — SIGNIFICANT CHANGE UP (ref 2.5–4.5)
PLATELET # BLD AUTO: 211 K/UL — SIGNIFICANT CHANGE UP (ref 150–400)
PLATELET # BLD AUTO: 221 K/UL — SIGNIFICANT CHANGE UP (ref 150–400)
PLATELET # BLD AUTO: 237 K/UL — SIGNIFICANT CHANGE UP (ref 150–400)
POTASSIUM SERPL-MCNC: 4.7 MMOL/L — SIGNIFICANT CHANGE UP (ref 3.5–5.3)
POTASSIUM SERPL-SCNC: 4.7 MMOL/L — SIGNIFICANT CHANGE UP (ref 3.5–5.3)
PROT SERPL-MCNC: 6 G/DL — SIGNIFICANT CHANGE UP (ref 6–8.3)
PROTHROM AB SERPL-ACNC: 20.7 SEC — HIGH (ref 9.8–12.7)
PROTHROM AB SERPL-ACNC: 22.5 SEC — HIGH (ref 9.8–12.7)
PROTHROM AB SERPL-ACNC: 27.2 SEC — HIGH (ref 9.8–12.7)
RBC # BLD: 3.68 M/UL — LOW (ref 3.8–5.2)
RBC # BLD: 3.71 M/UL — LOW (ref 3.8–5.2)
RBC # BLD: 3.83 M/UL — SIGNIFICANT CHANGE UP (ref 3.8–5.2)
RBC # FLD: 15.1 % — HIGH (ref 10.3–14.5)
RBC # FLD: 15.2 % — HIGH (ref 10.3–14.5)
RBC # FLD: 15.3 % — HIGH (ref 10.3–14.5)
SODIUM SERPL-SCNC: 138 MMOL/L — SIGNIFICANT CHANGE UP (ref 135–145)
SODIUM UR-SCNC: 28 MMOL/L — SIGNIFICANT CHANGE UP
UUN UR-MCNC: 342 MG/DL — SIGNIFICANT CHANGE UP
WBC # BLD: 28.5 K/UL — HIGH (ref 3.8–10.5)
WBC # BLD: 29.6 K/UL — HIGH (ref 3.8–10.5)
WBC # BLD: 30.6 K/UL — HIGH (ref 3.8–10.5)
WBC # FLD AUTO: 28.5 K/UL — HIGH (ref 3.8–10.5)
WBC # FLD AUTO: 29.6 K/UL — HIGH (ref 3.8–10.5)
WBC # FLD AUTO: 30.6 K/UL — HIGH (ref 3.8–10.5)

## 2017-08-10 PROCEDURE — 71010: CPT | Mod: 26

## 2017-08-10 PROCEDURE — 31575 DIAGNOSTIC LARYNGOSCOPY: CPT

## 2017-08-10 PROCEDURE — 99222 1ST HOSP IP/OBS MODERATE 55: CPT | Mod: 25

## 2017-08-10 PROCEDURE — 99291 CRITICAL CARE FIRST HOUR: CPT

## 2017-08-10 PROCEDURE — 93306 TTE W/DOPPLER COMPLETE: CPT | Mod: 26

## 2017-08-10 RX ORDER — ACETAMINOPHEN 500 MG
1000 TABLET ORAL ONCE
Qty: 0 | Refills: 0 | Status: COMPLETED | OUTPATIENT
Start: 2017-08-10 | End: 2017-08-10

## 2017-08-10 RX ORDER — ENOXAPARIN SODIUM 100 MG/ML
30 INJECTION SUBCUTANEOUS DAILY
Qty: 0 | Refills: 0 | Status: DISCONTINUED | OUTPATIENT
Start: 2017-08-10 | End: 2017-08-14

## 2017-08-10 RX ORDER — DEXMEDETOMIDINE HYDROCHLORIDE IN 0.9% SODIUM CHLORIDE 4 UG/ML
0.2 INJECTION INTRAVENOUS
Qty: 200 | Refills: 0 | Status: DISCONTINUED | OUTPATIENT
Start: 2017-08-10 | End: 2017-08-10

## 2017-08-10 RX ORDER — SODIUM CHLORIDE 9 MG/ML
1000 INJECTION, SOLUTION INTRAVENOUS ONCE
Qty: 0 | Refills: 0 | Status: COMPLETED | OUTPATIENT
Start: 2017-08-10 | End: 2017-08-10

## 2017-08-10 RX ORDER — PHENYLEPHRINE HYDROCHLORIDE 10 MG/ML
0.2 INJECTION INTRAVENOUS
Qty: 80 | Refills: 0 | Status: DISCONTINUED | OUTPATIENT
Start: 2017-08-10 | End: 2017-08-10

## 2017-08-10 RX ADMIN — PHENYLEPHRINE HYDROCHLORIDE 5.66 MICROGRAM(S)/KG/MIN: 10 INJECTION INTRAVENOUS at 00:55

## 2017-08-10 RX ADMIN — DEXTROSE MONOHYDRATE, SODIUM CHLORIDE, AND POTASSIUM CHLORIDE 30 MILLILITER(S): 50; .745; 4.5 INJECTION, SOLUTION INTRAVENOUS at 00:56

## 2017-08-10 RX ADMIN — Medication 50 MILLIGRAM(S): at 18:24

## 2017-08-10 RX ADMIN — Medication 2: at 05:59

## 2017-08-10 RX ADMIN — Medication 1000 MILLIGRAM(S): at 18:24

## 2017-08-10 RX ADMIN — Medication 50 MILLIGRAM(S): at 05:38

## 2017-08-10 RX ADMIN — Medication 400 MILLIGRAM(S): at 17:53

## 2017-08-10 RX ADMIN — SODIUM CHLORIDE 4000 MILLILITER(S): 9 INJECTION, SOLUTION INTRAVENOUS at 04:21

## 2017-08-10 RX ADMIN — DEXMEDETOMIDINE HYDROCHLORIDE IN 0.9% SODIUM CHLORIDE 3.77 MICROGRAM(S)/KG/HR: 4 INJECTION INTRAVENOUS at 04:21

## 2017-08-10 RX ADMIN — Medication 81 MILLIGRAM(S): at 12:43

## 2017-08-10 RX ADMIN — FENTANYL CITRATE 3.77 MICROGRAM(S)/KG/HR: 50 INJECTION INTRAVENOUS at 07:17

## 2017-08-10 RX ADMIN — DEXTROSE MONOHYDRATE, SODIUM CHLORIDE, AND POTASSIUM CHLORIDE 30 MILLILITER(S): 50; .745; 4.5 INJECTION, SOLUTION INTRAVENOUS at 07:17

## 2017-08-10 RX ADMIN — Medication 80 MILLIGRAM(S): at 00:49

## 2017-08-10 RX ADMIN — ENOXAPARIN SODIUM 30 MILLIGRAM(S): 100 INJECTION SUBCUTANEOUS at 12:43

## 2017-08-10 RX ADMIN — PHENYLEPHRINE HYDROCHLORIDE 5.66 MICROGRAM(S)/KG/MIN: 10 INJECTION INTRAVENOUS at 07:17

## 2017-08-10 RX ADMIN — PANTOPRAZOLE SODIUM 40 MILLIGRAM(S): 20 TABLET, DELAYED RELEASE ORAL at 12:42

## 2017-08-10 RX ADMIN — FENTANYL CITRATE 3.77 MICROGRAM(S)/KG/HR: 50 INJECTION INTRAVENOUS at 00:55

## 2017-08-10 NOTE — PROGRESS NOTE ADULT - SUBJECTIVE AND OBJECTIVE BOX
Interventional Radiology Follow- Up Note      Patient is a 84y old  Female with h/o afib, CAD s/p CAGB (on xarelto), DMII, HTN, HLD and known liver lesion who presented to ED with SOB and abdominal pain. Pt reported to have 2 episodes of syncope. CTA from today demonstrated "10.2 cm, complex cyst with internal high attenuation concerning for bleeding. An arterial vessel is noted inside this cystic structure concerning for active bleeding." as well as mod-lrg hemoperitoneum. Pt is currently s/p celiac angiogram and embolization of right hepatic mass in IR by Dr. Brasher on 8/8/17. Currently on 0.2mcg of neva and was extubated today. Pt seen and examined at bedside. Pt is awake and responds to commands.       PAST MEDICAL & SURGICAL HISTORY:  Heart failure  Type 2 diabetes mellitus: Diet controlled, Hg A1C 6.1 ( 4/22/17)  Dyspnea: at rest  Non-rheumatic mitral regurgitation: Severe  Breast cancer: s/p resection, chemo x 3 treatments only, completed radiation  Hypertension  HLD (hyperlipidemia)  PAF (paroxysmal atrial fibrillation): diagnosed 7/2013  CAD (coronary artery disease)  Cataract: b/l  Gout  GERD (gastroesophageal reflux disease)  Mitral regurgitation: s/p Mitraclip 5/2017  Cardiac pacemaker: AICD  S/P breast lumpectomy: left 7/20/2016  S/P cataract extraction: bilaterally-2014 withy artificial lenses  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  S/P hysterectomy      Allergies: No Known Allergies      LABS:                        11.1   30.6  )-----------( 221      ( 10 Aug 2017 09:37 )             33.1     08-10    138  |  103  |  32<H>  ----------------------------<  151<H>  4.7   |  19<L>  |  1.54<H>    Ca    8.5      10 Aug 2017 02:42  Phos  4.1     08-10  Mg     2.2     08-10    TPro  6.0  /  Alb  2.7<L>  /  TBili  2.3<H>  /  DBili  1.4<H>  /  AST  77<H>  /  ALT  41  /  AlkPhos  71  08-10    PT/INR - ( 10 Aug 2017 09:37 )   PT: 20.7 sec;   INR: 1.89 ratio         PTT - ( 10 Aug 2017 09:37 )  PTT:35.2 sec  I&O's Detail    09 Aug 2017 07:01  -  10 Aug 2017 07:00  --------------------------------------------------------  IN:    dexmedetomidine Infusion: 25.7 mL    dextrose 5% + sodium chloride 0.45% with potassium chloride 20 mEq/L: 630 mL    Enteral Tube Flush: 60 mL    fentaNYL  Infusion: 11.4 mL    fentaNYL  Infusion: 20.7 mL    IV PiggyBack: 200 mL    Lactated Ringers IV Bolus: 1000 mL    lactated ringers.: 225 mL    phenylephrine   Infusion: 78.3 mL  Total IN: 2251.1 mL    OUT:    Indwelling Catheter - Urethral: 580 mL    Nasoenteral Tube: 150 mL  Total OUT: 730 mL    Total NET: 1521.1 mL      10 Aug 2017 07:01  -  10 Aug 2017 15:33  --------------------------------------------------------  IN:    dextrose 5% + sodium chloride 0.45% with potassium chloride 20 mEq/L: 210 mL    Enteral Tube Flush: 60 mL    phenylephrine   Infusion: 37.1 mL  Total IN: 307.1 mL    OUT:    Indwelling Catheter - Urethral: 85 mL  Total OUT: 85 mL    Total NET: 222.1 mL      Vitals: T(F): 98.9 (08-10-17 @ 07:00), Max: 99.1 (08-10-17 @ 03:00)  HR: 112 (08-10-17 @ 14:45) (90 - 123)  BP: 157/114 (08-10-17 @ 07:15) (126/84 - 157/114)  RR: 19 (08-10-17 @ 14:45) (12 - 27)  SpO2: 100% (08-10-17 @ 14:45) (95% - 100%)    PHYSICAL EXAM:  Gen: NAD, awake and follows commands      A/P: 84y Female s/p hepatic embolization in IR by Dr. Brasher on 8/8/17  -Continue global car per primary team  -trend vitals, labs  -Transfuse as needed  -will discuss pt status with IR attending     Please call IR at extension [phone] with any questions, concerns, or issues regarding above.

## 2017-08-10 NOTE — PROGRESS NOTE ADULT - SUBJECTIVE AND OBJECTIVE BOX
Patient seen for hypotension requiring phenylephrine infusion.    CC US performed.  Wall motion grossly normal, limited exam due to tachycardia  LV walls nearly touching during systole, appears underfilled  4 chamber view difficult to obtain  IVC <1 cm, completely collapsing

## 2017-08-10 NOTE — CONSULT NOTE ADULT - SUBJECTIVE AND OBJECTIVE BOX
CC: Upper airway eval    HPI:64 yo F with significant PMHx , including cervical spine stenosis with lower back pain S/P laminectomy and spinal fusion on 8/8/17. As per primary team, pt was found to be dysphonic post extubation. Pt is not a good historian. + dysphagia, no cough, fevers.    PAST MEDICAL & SURGICAL HISTORY:  Heart failure  Type 2 diabetes mellitus: Diet controlled, Hg A1C 6.1 ( 4/22/17)  Dyspnea: at rest  Non-rheumatic mitral regurgitation: Severe  Breast cancer: s/p resection, chemo x 3 treatments only, completed radiation  Hypertension  HLD (hyperlipidemia)  PAF (paroxysmal atrial fibrillation): diagnosed 7/2013  CAD (coronary artery disease)  Cataract: b/l  Gout  GERD (gastroesophageal reflux disease)  Mitral regurgitation: s/p Mitraclip 5/2017  Cardiac pacemaker: AICD  S/P breast lumpectomy: left 7/20/2016  S/P cataract extraction: bilaterally-2014 withy artificial lenses  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  S/P hysterectomy    Allergies    No Known Allergies    Intolerances      MEDICATIONS  (STANDING):  pantoprazole  Injectable 40 milliGRAM(s) IV Push daily  insulin lispro (HumaLOG) corrective regimen sliding scale   SubCutaneous every 6 hours  dextrose 5% + sodium chloride 0.45% with potassium chloride 20 mEq/L 1000 milliLiter(s) (30 mL/Hr) IV Continuous <Continuous>  aspirin  chewable 81 milliGRAM(s) Oral daily  fentaNYL   Infusion 1 MICROgram(s)/kG/Hr (3.775 mL/Hr) IV Continuous <Continuous>  metoprolol 50 milliGRAM(s) Oral every 12 hours  metoprolol 25 milliGRAM(s) Oral once  phenylephrine    Infusion 0.2 MICROgram(s)/kG/Min (5.663 mL/Hr) IV Continuous <Continuous>  dexmedetomidine Infusion 0.2 MICROgram(s)/kG/Hr (3.775 mL/Hr) IV Continuous <Continuous>  enoxaparin Injectable 30 milliGRAM(s) SubCutaneous daily    MEDICATIONS  (PRN):    Social History: former smoker    ROS: ENT, GI, , CV, Pulm, Neuro, Psych, MS, Heme, Endo, Constitutional; all negative except as noted in HPI    Vital Signs Last 24 Hrs  T(C): 37 (10 Aug 2017 15:00), Max: 37.3 (10 Aug 2017 03:00)  T(F): 98.6 (10 Aug 2017 15:00), Max: 99.1 (10 Aug 2017 03:00)  HR: 115 (10 Aug 2017 16:30) (90 - 120)  BP: 157/114 (10 Aug 2017 07:15) (126/84 - 157/114)  BP(mean): 128 (10 Aug 2017 07:15) (97 - 128)  RR: 24 (10 Aug 2017 16:30) (12 - 27)  SpO2: 100% (10 Aug 2017 16:30) (95% - 100%)                          11.1   30.6  )-----------( 221      ( 10 Aug 2017 09:37 )             33.1    08-10    138  |  103  |  32<H>  ----------------------------<  151<H>  4.7   |  19<L>  |  1.54<H>    Ca    8.5      10 Aug 2017 02:42  Phos  4.1     08-10  Mg     2.2     08-10    TPro  6.0  /  Alb  2.7<L>  /  TBili  2.3<H>  /  DBili  1.4<H>  /  AST  77<H>  /  ALT  41  /  AlkPhos  71  08-10   PT/INR - ( 10 Aug 2017 09:37 )   PT: 20.7 sec;   INR: 1.89 ratio         PTT - ( 10 Aug 2017 09:37 )  PTT:35.2 sec    PHYSICAL EXAM:  Gen: NAD, well-developed  Head: Normocephalic, Atraumatic  Face: no edema/erythema/fluctuance, parotid glands soft without mass  Eyes: PERRL, EOMI, no scleral injection  Ears:  cerumen in B/L ears , TMs not visualized, no pus or edema  Nose: Nares bilaterally patent, no discharge  Mouth: Mucosa moist, tongue/uvula midline, oropharynx clear  Neck: Flat, supple, no lymphadenopathy, trachea midline, no masses  Resp: no use of accessory muscles, no stridor  CV: no peripheral edema/cyanosis    FOE/Laryngoscopy- Scope #3 used, B/L VC granulomas due to intubation, decreased sensation, B/L VC mobile and intact, no pooling of secretions, no vocal fold or epiglottic edema or erythema, upper airway patent, no masses, lesions, no subglottic stenosis

## 2017-08-10 NOTE — PROGRESS NOTE ADULT - SUBJECTIVE AND OBJECTIVE BOX
HISTORY OF PRESENT ILLNESS:  ELY WOOTEN is a 84y Female with h/o CHF, CAD s/p CABG, A.fib on Xarelto, PPM, MVR s/p clipping, HTN, HLD, DM who presented to ED after fall from standing x 2 in past 24hrs.  Pt denies LOC, admits to SOB and abdominal pain.  After +FAST in ED, pt underwent CTA which showed a 10.2cm complex liver cyst with high attenuation, concerning for active bleed. Pt taken to IR and underwent embolization of 4 vessels leading to cyst.  While in ED pt was tachycardic to 120s and received 1L bolus, 2 units PRBC, 2 FFP, 1500 K-centra and 10mg Vit K.  Emergent L femoral Introducer and A-line were placed.  In IR patient was intubated, briefly on Vitaly and received 4L crystalloid, 2 units PRBC, 6 FFP, 1 pack plts, and additional 1000 K-centra.  She underwent embolization of a large hemorrhagic liver mass. She was transferred intubated and sedated to the SICU.     24 Hour Events; Failed SBT due to tachypnea, brief run of Vtach in the afternoon which was nonsustained. Got 1500 of Kcentra and her L femoral sheath was d/gloria. CBCs have been stable in the last 24 hours. Got 20, 40 of lasix during day and another 80 overnight which she didnt respond to, on ultrasound appeared to be hypovolemic. She was briefly hypotensive requiring phenylphrine. Given 1L of LR and became normotensive. Metoprolol 25 increased to 50 secondary to tachycardia.    SUBJECTIVE/ROS:  [ ] A ten-point review of systems was otherwise negative except as noted.  [x ] Due to altered mental status/intubation, subjective information were not able to be obtained from the patient. History was obtained, to the extent possible, from review of the chart and collateral sources of information.      NEURO  RASS:-1  Exam: sedated, but responsive to pain, noxious stimuli  Meds: fentaNYL   Infusion 1 MICROgram(s)/kG/Hr IV Continuous <Continuous>  dexmedetomidine Infusion 0.2 MICROgram(s)/kG/Hr IV Continuous <Continuous>  [x] Adequacy of sedation and pain control has been assessed and adjusted      RESPIRATORY  RR: 17 (08-10-17 @ 05:00) (16 - 27)  SpO2: 100% (08-10-17 @ 05:00) (95% - 100%)  Exam: unlabored, clear to auscultation bilaterally  Mechanical Ventilation: Mode: AC/ CMV (Assist Control/ Continuous Mandatory Ventilation), RR (machine): 14, RR (patient): 18, TV (machine): 450, FiO2: 40, PEEP: 5, ITime: 1, MAP: 9, PIP: 25  ABG - ( 10 Aug 2017 02:41 )  pH: 7.43  /  pCO2: 34    /  pO2: 137   / HCO3: 22    / Base Excess: -1.1  /  SaO2: 99          CARDIOVASCULAR  HR: 115 (08-10-17 @ 05:00) (108 - 123)  BP: 126/84 (08-09-17 @ 19:00) (126/84 - 139/81)  BP(mean): 97 (08-09-17 @ 19:00) (97 - 101)  ABP: 113/66 (08-10-17 @ 05:00) (84/50 - 179/66)  ABP(mean): 84 (08-10-17 @ 05:00) (63 - 101)  Exam:  Cardiac Rhythm:  Perfusion     [x ]Adequate   [ ]Inadequate  Mentation   [ ]Normal       [ x]Reduced  Extremities  x[ ]Warm         [ ]Cool  Volume Status [ ]Hypervolemic [ ]Euvolemic [x ]Hypovolemic  Meds: metoprolol 50 milliGRAM(s) Oral every 12 hours  metoprolol 25 milliGRAM(s) Oral once  phenylephrine    Infusion 0.2 MICROgram(s)/kG/Min IV Continuous <Continuous>        GI/NUTRITION  Exam: softly distended, nt.   Diet: NPO  Meds: pantoprazole  Injectable 40 milliGRAM(s) IV Push daily      GENITOURINARY  I&O's Detail    08-08 @ 07:01  -  08-09 @ 07:00  --------------------------------------------------------  IN:    fentaNYL  Infusion: 41.8 mL    IV PiggyBack: 50 mL    Lactated Ringers IV Bolus: 1000 mL    lactated ringers.: 1200 mL  Total IN: 2291.8 mL    OUT:    Indwelling Catheter - Urethral: 505 mL    Nasoenteral Tube: 100 mL  Total OUT: 605 mL    Total NET: 1686.8 mL      08-09 @ 07:01  -  08-10 @ 06:10  --------------------------------------------------------  IN:    dexmedetomidine Infusion: 15.8 mL    dextrose 5% + sodium chloride 0.45% with potassium chloride 20 mEq/L: 570 mL    Enteral Tube Flush: 60 mL    fentaNYL  Infusion: 11.4 mL    fentaNYL  Infusion: 18.9 mL    IV PiggyBack: 200 mL    Lactated Ringers IV Bolus: 1000 mL    lactated ringers.: 225 mL    phenylephrine   Infusion: 22 mL  Total IN: 2123.1 mL    OUT:    Indwelling Catheter - Urethral: 480 mL    Nasoenteral Tube: 150 mL  Total OUT: 630 mL    Total NET: 1493.1 mL    08-10    138  |  103  |  32<H>  ----------------------------<  151<H>  4.7   |  19<L>  |  1.54<H>    Ca    8.5      10 Aug 2017 02:42  Phos  4.1     08-10  Mg     2.2     08-10    TPro  6.0  /  Alb  2.7<L>  /  TBili  2.3<H>  /  DBili  1.4<H>  /  AST  77<H>  /  ALT  41  /  AlkPhos  71  08-10    [x] Reyez catheter, indication: critically ill  Meds: dextrose 5% + sodium chloride 0.45% with potassium chloride 20 mEq/L 1000 milliLiter(s) IV Continuous <Continuous>    HEMATOLOGIC  Meds: aspirin  chewable 81 milliGRAM(s) Oral daily    [x] VTE Prophylaxis                        11.5   29.6  )-----------( 237      ( 10 Aug 2017 02:42 )             34.2     PT/INR - ( 10 Aug 2017 02:41 )   PT: 22.5 sec;   INR: 2.03 ratio         PTT - ( 10 Aug 2017 02:41 )  PTT:36.0 sec  Transfusion     [ ] PRBC   [ ] Platelets   [ ] FFP   [ ] Cryoprecipitate      INFECTIOUS DISEASES  T(C): 37.3 (08-10-17 @ 03:00), Max: 37.3 (08-10-17 @ 03:00)  WBC Count: 29.6 K/uL (08-10 @ 02:42)  WBC Count: 23.5 K/uL (08-09 @ 18:29)  WBC Count: 20.3 K/uL (08-09 @ 10:37)  WBC Count: 18.2 K/uL (08-09 @ 06:11)      ENDOCRINE  Capillary Blood Glucose  130 (09 Aug 2017 18:00)  117 (09 Aug 2017 12:00)    Meds: insulin lispro (HumaLOG) corrective regimen sliding scale   SubCutaneous every 6 hours    ACCESS DEVICES:  [ x] Peripheral IV  [ ] Central Venous Line	[ x] R	[ ] L	[ ] IJ	[ x] Fem	[ ] SC	Placed:   [x ] Arterial Line		[ ] R	[x] L	[ x] Fem	[ ] Rad	[ ] Ax	Placed:   [ ] PICC:					[ ] Mediport  [x ] Urinary Catheter, Date Placed: 8/8  [ ] Necessity of urinary, arterial, and venous catheters discussed    OTHER MEDICATIONS:      CODE STATUS: Full    IMAGING: CxR pending HISTORY OF PRESENT ILLNESS:  ELY WOOTEN is a 84y Female with h/o CHF, CAD s/p CABG, A.fib on Xarelto, PPM, MVR s/p clipping, HTN, HLD, DM who presented to ED after fall from standing x 2 in past 24hrs.  Pt denies LOC, admits to SOB and abdominal pain.  After +FAST in ED, pt underwent CTA which showed a 10.2cm complex liver cyst with high attenuation, concerning for active bleed. Pt taken to IR and underwent embolization of 4 vessels leading to cyst.  While in ED pt was tachycardic to 120s and received 1L bolus, 2 units PRBC, 2 FFP, 1500 K-centra and 10mg Vit K.  Emergent L femoral Introducer and A-line were placed.  In IR patient was intubated, briefly on Vitaly and received 4L crystalloid, 2 units PRBC, 6 FFP, 1 pack plts, and additional 1000 K-centra.  She underwent embolization of a large hemorrhagic liver mass. She was transferred intubated and sedated to the SICU.     24 Hour Events; Failed SBT due to tachypnea. Brief run of Vtach in the afternoon which was nonsustained, home dose of metoprolol started. Got 1500 of Kcentra and her L femoral sheath was d/gloria. CBCs have been stable in the last 24 hours. Got 20, then 40 of lasix during day and another 80 overnight which she didnt respond to, on ultrasound appeared to be hypovolemic. She was briefly hypotensive after 80 of lasix given requiring phenylphrine. Given 1L of LR and became normotensive. Metoprolol 25 increased to 50 secondary to tachycardia.    SUBJECTIVE/ROS:  [ ] A ten-point review of systems was otherwise negative except as noted.  [x ] Due to altered mental status/intubation, subjective information were not able to be obtained from the patient. History was obtained, to the extent possible, from review of the chart and collateral sources of information.      NEURO  RASS:-1  Exam: sedated, but responsive to pain, noxious stimuli  Meds: fentaNYL   Infusion 1 MICROgram(s)/kG/Hr IV Continuous <Continuous>  dexmedetomidine Infusion 0.2 MICROgram(s)/kG/Hr IV Continuous <Continuous>  [x] Adequacy of sedation and pain control has been assessed and adjusted      RESPIRATORY  RR: 17 (08-10-17 @ 05:00) (16 - 27)  SpO2: 100% (08-10-17 @ 05:00) (95% - 100%)  Exam: unlabored, clear to auscultation bilaterally  Mechanical Ventilation: Mode: AC/ CMV (Assist Control/ Continuous Mandatory Ventilation), RR (machine): 14, RR (patient): 18, TV (machine): 450, FiO2: 40, PEEP: 5, ITime: 1, MAP: 9, PIP: 25  ABG - ( 10 Aug 2017 02:41 )  pH: 7.43  /  pCO2: 34    /  pO2: 137   / HCO3: 22    / Base Excess: -1.1  /  SaO2: 99          CARDIOVASCULAR  HR: 115 (08-10-17 @ 05:00) (108 - 123)  BP: 126/84 (08-09-17 @ 19:00) (126/84 - 139/81)  BP(mean): 97 (08-09-17 @ 19:00) (97 - 101)  ABP: 113/66 (08-10-17 @ 05:00) (84/50 - 179/66)  ABP(mean): 84 (08-10-17 @ 05:00) (63 - 101)  Exam:  Cardiac Rhythm:  Perfusion     [x ]Adequate   [ ]Inadequate  Mentation   [ ]Normal       [ x]Reduced  Extremities  x[ ]Warm         [ ]Cool  Volume Status [ ]Hypervolemic [ ]Euvolemic [x ]Hypovolemic  Meds: metoprolol 50 milliGRAM(s) Oral every 12 hours  metoprolol 25 milliGRAM(s) Oral once  phenylephrine    Infusion 0.2 MICROgram(s)/kG/Min IV Continuous <Continuous>        GI/NUTRITION  Exam: softly distended, nt.   Diet: NPO  Meds: pantoprazole  Injectable 40 milliGRAM(s) IV Push daily      GENITOURINARY  I&O's Detail    08-08 @ 07:01  -  08-09 @ 07:00  --------------------------------------------------------  IN:    fentaNYL  Infusion: 41.8 mL    IV PiggyBack: 50 mL    Lactated Ringers IV Bolus: 1000 mL    lactated ringers.: 1200 mL  Total IN: 2291.8 mL    OUT:    Indwelling Catheter - Urethral: 505 mL    Nasoenteral Tube: 100 mL  Total OUT: 605 mL    Total NET: 1686.8 mL      08-09 @ 07:01  -  08-10 @ 06:10  --------------------------------------------------------  IN:    dexmedetomidine Infusion: 15.8 mL    dextrose 5% + sodium chloride 0.45% with potassium chloride 20 mEq/L: 570 mL    Enteral Tube Flush: 60 mL    fentaNYL  Infusion: 11.4 mL    fentaNYL  Infusion: 18.9 mL    IV PiggyBack: 200 mL    Lactated Ringers IV Bolus: 1000 mL    lactated ringers.: 225 mL    phenylephrine   Infusion: 22 mL  Total IN: 2123.1 mL    OUT:    Indwelling Catheter - Urethral: 480 mL    Nasoenteral Tube: 150 mL  Total OUT: 630 mL    Total NET: 1493.1 mL    08-10    138  |  103  |  32<H>  ----------------------------<  151<H>  4.7   |  19<L>  |  1.54<H>    Ca    8.5      10 Aug 2017 02:42  Phos  4.1     08-10  Mg     2.2     08-10    TPro  6.0  /  Alb  2.7<L>  /  TBili  2.3<H>  /  DBili  1.4<H>  /  AST  77<H>  /  ALT  41  /  AlkPhos  71  08-10    [x] Reyez catheter, indication: critically ill  Meds: dextrose 5% + sodium chloride 0.45% with potassium chloride 20 mEq/L 1000 milliLiter(s) IV Continuous <Continuous>    HEMATOLOGIC  Meds: aspirin  chewable 81 milliGRAM(s) Oral daily    [x] VTE Prophylaxis                        11.5   29.6  )-----------( 237      ( 10 Aug 2017 02:42 )             34.2     PT/INR - ( 10 Aug 2017 02:41 )   PT: 22.5 sec;   INR: 2.03 ratio         PTT - ( 10 Aug 2017 02:41 )  PTT:36.0 sec  Transfusion     [ ] PRBC   [ ] Platelets   [ ] FFP   [ ] Cryoprecipitate      INFECTIOUS DISEASES  T(C): 37.3 (08-10-17 @ 03:00), Max: 37.3 (08-10-17 @ 03:00)  WBC Count: 29.6 K/uL (08-10 @ 02:42)  WBC Count: 23.5 K/uL (08-09 @ 18:29)  WBC Count: 20.3 K/uL (08-09 @ 10:37)  WBC Count: 18.2 K/uL (08-09 @ 06:11)      ENDOCRINE  Capillary Blood Glucose  130 (09 Aug 2017 18:00)  117 (09 Aug 2017 12:00)    Meds: insulin lispro (HumaLOG) corrective regimen sliding scale   SubCutaneous every 6 hours    ACCESS DEVICES:  [ x] Peripheral IV  [ ] Central Venous Line	[ x] R	[ ] L	[ ] IJ	[ x] Fem	[ ] SC	Placed:   [x ] Arterial Line		[ ] R	[x] L	[ x] Fem	[ ] Rad	[ ] Ax	Placed:   [ ] PICC:					[ ] Mediport  [x ] Urinary Catheter, Date Placed: 8/8  [ ] Necessity of urinary, arterial, and venous catheters discussed    OTHER MEDICATIONS:      CODE STATUS: Full    IMAGING: CxR pending HISTORY OF PRESENT ILLNESS:  ELY WOOTEN is a 84y Female with h/o CHF, CAD s/p CABG, A.fib on Xarelto, PPM, MVR s/p clipping, HTN, HLD, DM who presented to ED after fall from standing x 2 in past 24hrs.  Pt denies LOC, admits to SOB and abdominal pain.  After +FAST in ED, pt underwent CTA which showed a 10.2cm complex liver cyst with high attenuation, concerning for active bleed. Pt taken to IR and underwent embolization of 4 vessels leading to cyst.  While in ED pt was tachycardic to 120s and received 1L bolus, 2 units PRBC, 2 FFP, 1500 K-centra and 10mg Vit K.  Emergent L femoral Introducer and A-line were placed.  In IR patient was intubated, briefly on Vitaly and received 4L crystalloid, 2 units PRBC, 6 FFP, 1 pack plts, and additional 1000 K-centra.  She underwent embolization of a large hemorrhagic liver mass. She was transferred intubated and sedated to the SICU.     24 Hour Events; Failed SBT due to tachypnea. Brief run of Vtach in the afternoon which was nonsustained, home dose of metoprolol started. Got 1500 of Kcentra and her L femoral sheath was d/gloria. CBCs have been stable in the last 24 hours. Got 20, then 40 of lasix during day and another 80 overnight which she didnt respond to, on ultrasound appeared to be hypovolemic. She was briefly hypotensive after 80 of lasix given requiring phenylphrine. Given 1L of LR and became normotensive. Metoprolol 25 increased to 50 secondary to tachycardia.    SUBJECTIVE/ROS:  [ ] A ten-point review of systems was otherwise negative except as noted.  [x ] Due to altered mental status/intubation, subjective information were not able to be obtained from the patient. History was obtained, to the extent possible, from review of the chart and collateral sources of information.      NEURO  RASS:-1  Exam: sedated, but responsive to pain, noxious stimuli  Meds: fentaNYL   Infusion 1 MICROgram(s)/kG/Hr IV Continuous <Continuous>  dexmedetomidine Infusion 0.2 MICROgram(s)/kG/Hr IV Continuous <Continuous>  [x] Adequacy of sedation and pain control has been assessed and adjusted      RESPIRATORY  RR: 17 (08-10-17 @ 05:00) (16 - 27)  SpO2: 100% (08-10-17 @ 05:00) (95% - 100%)  Exam: unlabored, clear to auscultation bilaterally  Mechanical Ventilation: Mode: AC/ CMV (Assist Control/ Continuous Mandatory Ventilation), RR (machine): 14, RR (patient): 18, TV (machine): 450, FiO2: 40, PEEP: 5, ITime: 1, MAP: 9, PIP: 25  ABG - ( 10 Aug 2017 02:41 )  pH: 7.43  /  pCO2: 34    /  pO2: 137   / HCO3: 22    / Base Excess: -1.1  /  SaO2: 99          CARDIOVASCULAR  HR: 115 (08-10-17 @ 05:00) (108 - 123)  BP: 126/84 (08-09-17 @ 19:00) (126/84 - 139/81)  BP(mean): 97 (08-09-17 @ 19:00) (97 - 101)  ABP: 113/66 (08-10-17 @ 05:00) (84/50 - 179/66)  ABP(mean): 84 (08-10-17 @ 05:00) (63 - 101)  Exam:  Cardiac Rhythm:  Perfusion     [x ]Adequate   [ ]Inadequate  Mentation   [ ]Normal       [ x]Reduced  Extremities  x[ ]Warm         [ ]Cool  Volume Status [ ]Hypervolemic [ ]Euvolemic [x ]Hypovolemic  Meds: metoprolol 50 milliGRAM(s) Oral every 12 hours  metoprolol 25 milliGRAM(s) Oral once  phenylephrine    Infusion 0.2 MICROgram(s)/kG/Min IV Continuous <Continuous>        GI/NUTRITION  Exam: softly distended, nt.   Diet: NPO  Meds: pantoprazole  Injectable 40 milliGRAM(s) IV Push daily      GENITOURINARY    I&O's Detail    09 Aug 2017 07:01  -  10 Aug 2017 07:00  --------------------------------------------------------  IN:    dexmedetomidine Infusion: 25.7 mL    dextrose 5% + sodium chloride 0.45% with potassium chloride 20 mEq/L: 630 mL    Enteral Tube Flush: 60 mL    fentaNYL  Infusion: 11.4 mL    fentaNYL  Infusion: 20.7 mL    IV PiggyBack: 200 mL    Lactated Ringers IV Bolus: 1000 mL    lactated ringers.: 225 mL    phenylephrine   Infusion: 78.3 mL  Total IN: 2251.1 mL    OUT:    Indwelling Catheter - Urethral: 560 mL    Nasoenteral Tube: 150 mL  Total OUT: 710 mL    Total NET: 1541.1 mL    08-10    138  |  103  |  32<H>  ----------------------------<  151<H>  4.7   |  19<L>  |  1.54<H>    Ca    8.5      10 Aug 2017 02:42  Phos  4.1     08-10  Mg     2.2     08-10    TPro  6.0  /  Alb  2.7<L>  /  TBili  2.3<H>  /  DBili  1.4<H>  /  AST  77<H>  /  ALT  41  /  AlkPhos  71  08-10    [x] Reyez catheter, indication: critically ill  Meds: dextrose 5% + sodium chloride 0.45% with potassium chloride 20 mEq/L 1000 milliLiter(s) IV Continuous <Continuous>    HEMATOLOGIC  Meds: aspirin  chewable 81 milliGRAM(s) Oral daily    [x] VTE Prophylaxis                        11.5   29.6  )-----------( 237      ( 10 Aug 2017 02:42 )             34.2     PT/INR - ( 10 Aug 2017 02:41 )   PT: 22.5 sec;   INR: 2.03 ratio         PTT - ( 10 Aug 2017 02:41 )  PTT:36.0 sec  Transfusion     [ ] PRBC   [ ] Platelets   [ ] FFP   [ ] Cryoprecipitate      INFECTIOUS DISEASES  T(C): 37.3 (08-10-17 @ 03:00), Max: 37.3 (08-10-17 @ 03:00)  WBC Count: 29.6 K/uL (08-10 @ 02:42)  WBC Count: 23.5 K/uL (08-09 @ 18:29)  WBC Count: 20.3 K/uL (08-09 @ 10:37)  WBC Count: 18.2 K/uL (08-09 @ 06:11)      ENDOCRINE  Capillary Blood Glucose  130 (09 Aug 2017 18:00)  117 (09 Aug 2017 12:00)    Meds: insulin lispro (HumaLOG) corrective regimen sliding scale   SubCutaneous every 6 hours    ACCESS DEVICES:  [ x] Peripheral IV  [ ] Central Venous Line	[ x] R	[ ] L	[ ] IJ	[ x] Fem	[ ] SC	Placed:   [x ] Arterial Line		[ ] R	[x] L	[ x] Fem	[ ] Rad	[ ] Ax	Placed:   [ ] PICC:					[ ] Mediport  [x ] Urinary Catheter, Date Placed: 8/8  [ ] Necessity of urinary, arterial, and venous catheters discussed    OTHER MEDICATIONS:    CODE STATUS: Full    IMAGING: CxR pending

## 2017-08-10 NOTE — PROGRESS NOTE ADULT - SUBJECTIVE AND OBJECTIVE BOX
Surgery Blue Team Progress Note:  Hospital Day 3, Postprocedure day 2 s/p celiac angiogram and embolization of right hepatic mass hemorrhage.    Subjective:  Overnight, patient was found to have hypovolemic on U/S and had low urine output, non-responsive to diuresis (lasix administered 3x, patient briefly became hypotensive after last dose);Phenylephrine was administered and 1 Liter NS bolus was administered and patient was normotensive.  Metoprolol was increased from 25 to 50 for tachycardia.  Patient failed SBT yesterday, 08/09/17, and remains intubated.  Patient examined at bedside, intubated and sedated (precedex) opens her eyes to verbal stimulus, but currently not responding to team's commands - nurse however, states that the patient was responding to commands by squeezing her hands prior to sedation.     Objective:  Vitals:  ICU Vital Signs Last 24 Hrs  T(C): 37.2 (10 Aug 2017 07:00), Max: 37.3 (10 Aug 2017 03:00)  T(F): 98.9 (10 Aug 2017 07:00), Max: 99.1 (10 Aug 2017 03:00)  HR: 108 (10 Aug 2017 10:15) (90 - 123)  BP: 157/114 (10 Aug 2017 07:15) (126/84 - 157/114)  BP(mean): 128 (10 Aug 2017 07:15) (97 - 128)  ABP: 135/73 (10 Aug 2017 10:15) (84/50 - 162/104)  ABP(mean): 95 (10 Aug 2017 10:15) (63 - 126)  RR: 14 (10 Aug 2017 10:15) (12 - 27)  SpO2: 100% (10 Aug 2017 10:15) (95% - 100%)    Labs:                        11.1   30.6  )-----------( 221      ( 10 Aug 2017 09:37 )             33.1       08-10    138  |  103  |  32<H>  ----------------------------<  151<H>  4.7   |  19<L>  |  1.54<H>    Ca    8.5      10 Aug 2017 02:42  Phos  4.1     08-10  Mg     2.2     08-10    TPro  6.0  /  Alb  2.7<L>  /  TBili  2.3<H>  /  DBili  1.4<H>  /  AST  77<H>  /  ALT  41  /  AlkPhos  71  08-10      I&O's Detail    09 Aug 2017 07:01  -  10 Aug 2017 07:00  --------------------------------------------------------  IN:    dexmedetomidine Infusion: 25.7 mL    dextrose 5% + sodium chloride 0.45% with potassium chloride 20 mEq/L: 630 mL    Enteral Tube Flush: 60 mL    fentaNYL  Infusion: 11.4 mL    fentaNYL  Infusion: 20.7 mL    IV PiggyBack: 200 mL    Lactated Ringers IV Bolus: 1000 mL    lactated ringers.: 225 mL    phenylephrine   Infusion: 78.3 mL  Total IN: 2251.1 mL    OUT:    Indwelling Catheter - Urethral: 580 mL    Nasoenteral Tube: 150 mL  Total OUT: 730 mL    Total NET: 1521.1 mL      10 Aug 2017 07:01  -  10 Aug 2017 10:29  --------------------------------------------------------  IN:    dextrose 5% + sodium chloride 0.45% with potassium chloride 20 mEq/L: 90 mL    phenylephrine   Infusion: 21.4 mL  Total IN: 111.4 mL    OUT:    Indwelling Catheter - Urethral: 35 mL  Total OUT: 35 mL    Total NET: 76.4 mL    Physical Exam:  General: NAD, sedated, and intubated  Respiratory: nonlabored breathing, intubated;   Ventilator Settings:  Mode: CPAP with PS  FiO2: 40  PEEP: 5  PS: 5  MAP: 9  PIP: 17  Abdomen: soft, slightly distended asymmetrically towards right    Medications:  MEDICATIONS  (STANDING):  pantoprazole  Injectable 40 milliGRAM(s) IV Push daily  insulin lispro (HumaLOG) corrective regimen sliding scale   SubCutaneous every 6 hours  dextrose 5% + sodium chloride 0.45% with potassium chloride 20 mEq/L 1000 milliLiter(s) (30 mL/Hr) IV Continuous <Continuous>  aspirin  chewable 81 milliGRAM(s) Oral daily  fentaNYL   Infusion 1 MICROgram(s)/kG/Hr (3.775 mL/Hr) IV Continuous <Continuous>  metoprolol 50 milliGRAM(s) Oral every 12 hours  metoprolol 25 milliGRAM(s) Oral once  phenylephrine    Infusion 0.2 MICROgram(s)/kG/Min (5.663 mL/Hr) IV Continuous <Continuous>  dexmedetomidine Infusion 0.2 MICROgram(s)/kG/Hr (3.775 mL/Hr) IV Continuous <Continuous>    Imaging:  Chest Xray 08/10/17      Impression:    Poor inspiratory effort. The heart is enlarged. No acute consolidation   could be identified. Endotracheal tube and NG tube are in good position.   A pacer is in place. No pneumothorax. Status post sternotomy.

## 2017-08-10 NOTE — PROGRESS NOTE ADULT - ASSESSMENT
Patient had low UOP and did not respond to multiple attempts at diuresis today. Earlier, patient had dilated IVC and she takes lasix at home. Now, she appears intravascularly depleted on US. One possible explanation is ongoing blood loss, although her CBC has thus far been stable.    Hypovolemic shock:  - volume challenge given (1 L LR) with good response, weaned off of vasopressors  - will monitor serial H/H for signs of ongoing bleeding    This patient is critically ill. I provided 35 minutes of care.

## 2017-08-10 NOTE — PROGRESS NOTE ADULT - ASSESSMENT
84 year old female with bleeding 10.2cm liver cyst s/p fall now s/p IR embolization    Neuro: post-procedure pain control  -Fentanyl gtt    Resp: Acute respiratory failure Intubated for procedure  -CXR and ABG normal.  -SBT today    CV: Hemorrhagic shock class I s/p resuscitation; h/o PAF on Xarelto, CHF, CAD s/p CABG, MVR s/p clipping, PPM, HTN, HLD  - hemodynamic monitoring  - metoprolol increased secondary to tachycardia  - Monitor volume status carefully    GI: h/o GERD, enteritis on CT  -NPO/NGT  -Protonix     /Renal: CKD Stage 3 with MYA  - continue maintenance fluids  - f/u repeat labs  - strict I's/O's    Heme: Hemorrhagic shock s/p IR embolization liver cyst  -CBC, coags q8hrs  -SCDs      ID: Afebrile  -Trend WBC    Endo: h/o DM  -ISS    Dispo: Full code, appropriate for SICU care.     Melba Chong PGY 2 84 year old female with bleeding 10.2cm liver cyst s/p fall now s/p IR embolization    Neuro: post-procedure pain control  -Fentanyl gtt and precedex gtt    Resp: Acute respiratory failure Intubated for procedure  -CXR and ABG normal.  -SBT today    CV: Hemorrhagic shock class I s/p resuscitation; h/o PAF on Xarelto, CHF, CAD s/p CABG, MVR s/p clipping, PPM, HTN, HLD  - hemodynamic monitoring  - metoprolol increased secondary to tachycardia  - Monitor volume status carefully    GI: h/o GERD, enteritis on CT  -NPO/NGT  -Protonix     /Renal: CKD Stage 3 with MYA  - continue maintenance fluids  - f/u repeat labs  - strict I's/O's    Heme: Hemorrhagic shock s/p IR embolization liver cyst  -CBC, coags q8hrs  -SCDs      ID: Afebrile  -Trend WBC    Endo: h/o DM  -ISS    Dispo: Full code, appropriate for SICU care.     Melba Chong PGY 2 84 year old female with bleeding 10.2cm liver cyst s/p fall now s/p IR embolization    Neuro: post-procedure pain control  -Fentanyl gtt and precedex gtt    Resp: Acute respiratory failure Intubated for procedure  -CXR with pulm edema, ABG good on minimal vent settings  -SBT today    CV: Hemorrhagic shock class I s/p resuscitation; h/o PAF on Xarelto, CHF, CAD s/p CABG, MVR s/p clipping, PPM, HTN, HLD  - hemodynamic monitoring  - metoprolol increased secondary to tachycardia  - Monitor volume status carefully    GI: h/o GERD, enteritis on CT  -NPO/NGT  -Protonix     /Renal: CKD Stage 3 with MYA  - continue maintenance fluids  - f/u repeat labs  - strict I's/O's    Heme: Hemorrhagic shock s/p IR embolization liver cyst  -CBC, coags q8hrs  -SCDs      ID: Afebrile  -Trend WBC    Endo: h/o DM  -ISS    Dispo: Full code, appropriate for SICU care.     Melba Chong PGY 2

## 2017-08-10 NOTE — CONSULT NOTE ADULT - ASSESSMENT
62 yo F with dysphagia, pt is able to phonate. Pt is not able to talk due to poor effort. There is no VC paralysis on exam. Upper airway is patent

## 2017-08-11 LAB
ALBUMIN SERPL ELPH-MCNC: 2.6 G/DL — LOW (ref 3.3–5)
ALP SERPL-CCNC: 75 U/L — SIGNIFICANT CHANGE UP (ref 40–120)
ALT FLD-CCNC: 40 U/L RC — SIGNIFICANT CHANGE UP (ref 10–45)
ANION GAP SERPL CALC-SCNC: 14 MMOL/L — SIGNIFICANT CHANGE UP (ref 5–17)
APTT BLD: 35.5 SEC — SIGNIFICANT CHANGE UP (ref 27.5–37.4)
APTT BLD: 36.1 SEC — SIGNIFICANT CHANGE UP (ref 27.5–37.4)
AST SERPL-CCNC: 51 U/L — HIGH (ref 10–40)
BILIRUB DIRECT SERPL-MCNC: 1.4 MG/DL — HIGH (ref 0–0.2)
BILIRUB INDIRECT FLD-MCNC: 0.7 MG/DL — SIGNIFICANT CHANGE UP (ref 0.2–1)
BILIRUB SERPL-MCNC: 2.1 MG/DL — HIGH (ref 0.2–1.2)
BUN SERPL-MCNC: 42 MG/DL — HIGH (ref 7–23)
CALCIUM SERPL-MCNC: 8.4 MG/DL — SIGNIFICANT CHANGE UP (ref 8.4–10.5)
CHLORIDE SERPL-SCNC: 103 MMOL/L — SIGNIFICANT CHANGE UP (ref 96–108)
CO2 SERPL-SCNC: 20 MMOL/L — LOW (ref 22–31)
CREAT SERPL-MCNC: 1.64 MG/DL — HIGH (ref 0.5–1.3)
EOSINOPHIL NFR URNS MANUAL: NEGATIVE — SIGNIFICANT CHANGE UP
GAS PNL BLDA: SIGNIFICANT CHANGE UP
GLUCOSE SERPL-MCNC: 167 MG/DL — HIGH (ref 70–99)
HCT VFR BLD CALC: 34.6 % — SIGNIFICANT CHANGE UP (ref 34.5–45)
HCT VFR BLD CALC: 35.3 % — SIGNIFICANT CHANGE UP (ref 34.5–45)
HGB BLD-MCNC: 11.1 G/DL — LOW (ref 11.5–15.5)
HGB BLD-MCNC: 11.1 G/DL — LOW (ref 11.5–15.5)
INR BLD: 1.89 RATIO — HIGH (ref 0.88–1.16)
INR BLD: 1.94 RATIO — HIGH (ref 0.88–1.16)
MAGNESIUM SERPL-MCNC: 2.2 MG/DL — SIGNIFICANT CHANGE UP (ref 1.6–2.6)
MCHC RBC-ENTMCNC: 28.6 PG — SIGNIFICANT CHANGE UP (ref 27–34)
MCHC RBC-ENTMCNC: 29 PG — SIGNIFICANT CHANGE UP (ref 27–34)
MCHC RBC-ENTMCNC: 31.5 GM/DL — LOW (ref 32–36)
MCHC RBC-ENTMCNC: 32.1 GM/DL — SIGNIFICANT CHANGE UP (ref 32–36)
MCV RBC AUTO: 90.5 FL — SIGNIFICANT CHANGE UP (ref 80–100)
MCV RBC AUTO: 91 FL — SIGNIFICANT CHANGE UP (ref 80–100)
PHOSPHATE SERPL-MCNC: 4.4 MG/DL — SIGNIFICANT CHANGE UP (ref 2.5–4.5)
PLATELET # BLD AUTO: 201 K/UL — SIGNIFICANT CHANGE UP (ref 150–400)
PLATELET # BLD AUTO: 208 K/UL — SIGNIFICANT CHANGE UP (ref 150–400)
POTASSIUM SERPL-MCNC: 4.7 MMOL/L — SIGNIFICANT CHANGE UP (ref 3.5–5.3)
POTASSIUM SERPL-SCNC: 4.7 MMOL/L — SIGNIFICANT CHANGE UP (ref 3.5–5.3)
PROT SERPL-MCNC: 6 G/DL — SIGNIFICANT CHANGE UP (ref 6–8.3)
PROTHROM AB SERPL-ACNC: 20.9 SEC — HIGH (ref 9.8–12.7)
PROTHROM AB SERPL-ACNC: 21.4 SEC — HIGH (ref 9.8–12.7)
RBC # BLD: 3.83 M/UL — SIGNIFICANT CHANGE UP (ref 3.8–5.2)
RBC # BLD: 3.88 M/UL — SIGNIFICANT CHANGE UP (ref 3.8–5.2)
RBC # FLD: 15.1 % — HIGH (ref 10.3–14.5)
RBC # FLD: 15.1 % — HIGH (ref 10.3–14.5)
SODIUM SERPL-SCNC: 137 MMOL/L — SIGNIFICANT CHANGE UP (ref 135–145)
WBC # BLD: 27.7 K/UL — HIGH (ref 3.8–10.5)
WBC # BLD: 28.4 K/UL — HIGH (ref 3.8–10.5)
WBC # FLD AUTO: 27.7 K/UL — HIGH (ref 3.8–10.5)
WBC # FLD AUTO: 28.4 K/UL — HIGH (ref 3.8–10.5)

## 2017-08-11 PROCEDURE — 71010: CPT | Mod: 26

## 2017-08-11 PROCEDURE — 99233 SBSQ HOSP IP/OBS HIGH 50: CPT | Mod: GC

## 2017-08-11 RX ORDER — ACETAMINOPHEN 500 MG
1000 TABLET ORAL ONCE
Qty: 0 | Refills: 0 | Status: COMPLETED | OUTPATIENT
Start: 2017-08-11 | End: 2017-08-11

## 2017-08-11 RX ORDER — FUROSEMIDE 40 MG
80 TABLET ORAL ONCE
Qty: 0 | Refills: 0 | Status: COMPLETED | OUTPATIENT
Start: 2017-08-11 | End: 2017-08-11

## 2017-08-11 RX ORDER — SODIUM CHLORIDE 9 MG/ML
1000 INJECTION, SOLUTION INTRAVENOUS ONCE
Qty: 0 | Refills: 0 | Status: COMPLETED | OUTPATIENT
Start: 2017-08-11 | End: 2017-08-11

## 2017-08-11 RX ORDER — ACETAMINOPHEN 500 MG
1000 TABLET ORAL ONCE
Qty: 0 | Refills: 0 | Status: COMPLETED | OUTPATIENT
Start: 2017-08-11 | End: 2017-08-12

## 2017-08-11 RX ORDER — FUROSEMIDE 40 MG
40 TABLET ORAL EVERY 12 HOURS
Qty: 0 | Refills: 0 | Status: DISCONTINUED | OUTPATIENT
Start: 2017-08-11 | End: 2017-08-11

## 2017-08-11 RX ORDER — PANTOPRAZOLE SODIUM 20 MG/1
40 TABLET, DELAYED RELEASE ORAL DAILY
Qty: 0 | Refills: 0 | Status: DISCONTINUED | OUTPATIENT
Start: 2017-08-11 | End: 2017-08-21

## 2017-08-11 RX ADMIN — Medication 50 MILLIGRAM(S): at 05:00

## 2017-08-11 RX ADMIN — Medication 400 MILLIGRAM(S): at 02:18

## 2017-08-11 RX ADMIN — SODIUM CHLORIDE 4000 MILLILITER(S): 9 INJECTION, SOLUTION INTRAVENOUS at 06:50

## 2017-08-11 RX ADMIN — Medication 1000 MILLIGRAM(S): at 17:40

## 2017-08-11 RX ADMIN — PANTOPRAZOLE SODIUM 40 MILLIGRAM(S): 20 TABLET, DELAYED RELEASE ORAL at 11:23

## 2017-08-11 RX ADMIN — Medication 80 MILLIGRAM(S): at 21:34

## 2017-08-11 RX ADMIN — DEXTROSE MONOHYDRATE, SODIUM CHLORIDE, AND POTASSIUM CHLORIDE 125 MILLILITER(S): 50; .745; 4.5 INJECTION, SOLUTION INTRAVENOUS at 05:00

## 2017-08-11 RX ADMIN — Medication 2: at 05:00

## 2017-08-11 RX ADMIN — Medication 400 MILLIGRAM(S): at 17:16

## 2017-08-11 RX ADMIN — Medication 40 MILLIGRAM(S): at 12:54

## 2017-08-11 RX ADMIN — Medication 1000 MILLIGRAM(S): at 02:30

## 2017-08-11 RX ADMIN — ENOXAPARIN SODIUM 30 MILLIGRAM(S): 100 INJECTION SUBCUTANEOUS at 11:23

## 2017-08-11 RX ADMIN — DEXTROSE MONOHYDRATE, SODIUM CHLORIDE, AND POTASSIUM CHLORIDE 125 MILLILITER(S): 50; .745; 4.5 INJECTION, SOLUTION INTRAVENOUS at 07:51

## 2017-08-11 RX ADMIN — Medication 81 MILLIGRAM(S): at 11:23

## 2017-08-11 RX ADMIN — DEXTROSE MONOHYDRATE, SODIUM CHLORIDE, AND POTASSIUM CHLORIDE 50 MILLILITER(S): 50; .745; 4.5 INJECTION, SOLUTION INTRAVENOUS at 12:37

## 2017-08-11 RX ADMIN — Medication 50 MILLIGRAM(S): at 17:15

## 2017-08-11 NOTE — PROVIDER CONTACT NOTE (CHANGE IN STATUS NOTIFICATION) - ACTION/TREATMENT ORDERED:
will come assess pt and discuss treatment with Dr. Pérez will come assess pt and discuss treatment with Dr. Pérez. pt will be diuresed with lasix and metolazone (see orders/emar)

## 2017-08-11 NOTE — PROGRESS NOTE ADULT - SUBJECTIVE AND OBJECTIVE BOX
Blue Team Surgery    Pt seen and examined. Extubated yesterday. No respiratory issues, off pressors. Pain is well controlled. No BMs.    Objective:   Vitals:   Vital Signs Last 24 Hrs  T(C): 36.6 (11 Aug 2017 07:00), Max: 37.3 (10 Aug 2017 23:00)  T(F): 97.9 (11 Aug 2017 07:00), Max: 99.1 (10 Aug 2017 23:00)  HR: 105 (11 Aug 2017 08:00) (91 - 117)  BP: 143/97 (11 Aug 2017 07:00) (132/60 - 143/97)  BP(mean): 115 (11 Aug 2017 07:00) (6 - 115)  RR: 17 (11 Aug 2017 08:00) (12 - 24)  SpO2: 98% (11 Aug 2017 08:00) (93% - 100%)  In/Outs:    08-10 @ 07:01 - 08-11 @ 07:00  --------------------------------------------------------  IN:    dextrose 5% + sodium chloride 0.45% with potassium chloride 20 mEq/L: 1955 mL    Enteral Tube Flush: 120 mL    Lactated Ringers IV Bolus: 1000 mL    phenylephrine   Infusion: 37.1 mL  Total IN: 3112.1 mL    OUT:    Indwelling Catheter - Urethral: 425 mL  Total OUT: 425 mL    Total NET: 2687.1 mL      08-11 @ 07:01 - 08-11 @ 09:34  --------------------------------------------------------  IN:    dextrose 5% + sodium chloride 0.45% with potassium chloride 20 mEq/L: 125 mL  Total IN: 125 mL    OUT:    Indwelling Catheter - Urethral: 20 mL  Total OUT: 20 mL    Total NET: 105 mL          Physical Exam  General: NAD   Abdomen: soft, NT, moderately distended, RUQ asymmetric fullness-nontender    MEDICATIONS  (STANDING):  pantoprazole  Injectable 40 milliGRAM(s) IV Push daily  insulin lispro (HumaLOG) corrective regimen sliding scale   SubCutaneous every 6 hours  dextrose 5% + sodium chloride 0.45% with potassium chloride 20 mEq/L 1000 milliLiter(s) (125 mL/Hr) IV Continuous <Continuous>  aspirin  chewable 81 milliGRAM(s) Oral daily  metoprolol 50 milliGRAM(s) Oral every 12 hours  enoxaparin Injectable 30 milliGRAM(s) SubCutaneous daily    MEDICATIONS  (PRN):      LABS:                        11.1   28.4  )-----------( 208      ( 11 Aug 2017 02:36 )             34.6     08-11    137  |  103  |  42<H>  ----------------------------<  167<H>  4.7   |  20<L>  |  1.64<H>    Ca    8.4      11 Aug 2017 02:36  Phos  4.4     08-11  Mg     2.2     08-11    TPro  6.0  /  Alb  2.6<L>  /  TBili  2.1<H>  /  DBili  1.4<H>  /  AST  51<H>  /  ALT  40  /  AlkPhos  75  08-11    PT/INR - ( 11 Aug 2017 02:36 )   PT: 21.4 sec;   INR: 1.94 ratio         PTT - ( 11 Aug 2017 02:36 )  PTT:36.1 sec        RADIOLOGY & ADDITIONAL STUDIES:      84F Afib (taken off Xeralto) s/p IR embolization of a bleeding R hepatic cyst    Plan:  Pt has had a difficult to assess volume status. Would consider repeating echocardiogram- given poor quality of previous echo.  Continue to monitor UOP  trend h/h  OK with Lovenox for DVT PPx  Pain control  f/u swallow evaluation today    [phone]          Eder Wood MD, PhD  General Surgery Chief Resident   [phone]

## 2017-08-11 NOTE — PROGRESS NOTE ADULT - ATTENDING COMMENTS
Dr. Burger (Attending Physician)  Large liver lesion h/h stable with persistent coagulopathy. Dr. Burger (Attending Physician)  Large liver lesion h/h stable with persistent coagulopathy likely secondary to liver dysfunction.  MYA on CKD with low urine output.  Volume overloaded on CXR, BNP elevated and IVC dilated on Echo yesterday will start lasix 40 IV BID.

## 2017-08-11 NOTE — PROGRESS NOTE ADULT - SUBJECTIVE AND OBJECTIVE BOX
HISTORY OF PRESENT ILLNESS:  ELY WOOTEN is a 84y Female with h/o CHF, CAD s/p CABG, A.fib on Xarelto, PPM, MVR s/p clipping, HTN, HLD, DM who presented to ED after fall from standing x 2 in past 24hrs.  Pt denies LOC, admits to SOB and abdominal pain.  After +FAST in ED, pt underwent CTA which showed a 10.2cm complex liver cyst with high attenuation, concerning for active bleed. Pt taken to IR and underwent embolization of 4 vessels leading to cyst.  While in ED pt was tachycardic to 120s and received 1L bolus, 2 units PRBC, 2 FFP, 1500 K-centra and 10mg Vit K.  Emergent L femoral Introducer and A-line were placed.  In IR patient was intubated, briefly on Vitaly and received 4L crystalloid, 2 units PRBC, 6 FFP, 1 pack plts, and additional 1000 K-centra.  She underwent embolization of a large hemorrhagic liver mass. She was transferred intubated and sedated to the SICU.     24 HOUR EVENTS: Patient was successfully extubated this AM but is having a lot of secretions. Patient did have trouble phonating following extubated. ENT saw patient and scoped her but saw no vocal cord paralysis. They did note markedly decreased sensation of glottis that is likely to impair her ability to protect her airway but did not see any pooling of secretions and noted that her voice will likely improve once her pulmonary status improves. AICD was interrogated and no changes were recommended. HISTORY OF PRESENT ILLNESS:  ELY WOOTEN is a 84y Female with h/o CHF, CAD s/p CABG, A.fib on Xarelto, PPM, MVR s/p clipping, HTN, HLD, DM who presented to ED after fall from standing x 2 in past 24hrs.  Pt denies LOC, admits to SOB and abdominal pain.  After +FAST in ED, pt underwent CTA which showed a 10.2cm complex liver cyst with high attenuation, concerning for active bleed. Pt taken to IR and underwent embolization of 4 vessels leading to cyst.  While in ED pt was tachycardic to 120s and received 1L bolus, 2 units PRBC, 2 FFP, 1500 K-centra and 10mg Vit K.  Emergent L femoral Introducer and A-line were placed.  In IR patient was intubated, briefly on Vitayl and received 4L crystalloid, 2 units PRBC, 6 FFP, 1 pack plts, and additional 1000 K-centra.  She underwent embolization of a large hemorrhagic liver mass. She was transferred intubated and sedated to the SICU.     24 HOUR EVENTS: Patient was successfully extubated this AM but is having a lot of secretions. Patient did have trouble phonating following extubated. ENT saw patient and scoped her but saw no vocal cord paralysis. They did note markedly decreased sensation of glottis that is likely to impair her ability to protect her airway but did not see any pooling of secretions and noted that her voice will likely improve once her pulmonary status improves. AICD was interrogated and no changes were recommended. Official echo was obtained that showed moderately reduced LV function, severely dilated LA, and grossly decreased RV function. WBC continues to be elevated at 28.4. Culture results pending.    SUBJECTIVE/ROS:  [x] A ten-point review of systems was otherwise negative except as noted.  [ ] Due to altered mental status/intubation, subjective information were not able to be obtained from the patient. History was obtained, to the extent possible, from review of the chart and collateral sources of information.    NEURO  CAM ICU: negative  Exam: awake, alert, oriented x3  Meds: IV acetaminophen  [x] Adequacy of sedation and pain control has been assessed and adjusted    RESPIRATORY  RR: 13 (08-11-17 @ 05:00) (12 - 24)  SpO2: 100% (08-11-17 @ 05:00) (93% - 100%)  Exam: clear to auscultation bilaterally  Mechanical Ventilation: no  [N/A] Extubation Readiness Assessed  ABG - ( 11 Aug 2017 02:32 )  pH: 7.37  /  pCO2: 39    /  pO2: 91    / HCO3: 22    / Base Excess: -2.6  /  SaO2: 97      Lactate: 1.5  Meds: none     CARDIOVASCULAR  HR: 98 (08-11-17 @ 05:00) (90 - 117)  BP: 132/60 (08-10-17 @ 19:15) (132/60 - 157/114)  BP(mean): 86 (08-10-17 @ 19:15) (6 - 128)  ABP: 124/68 (08-11-17 @ 05:00) (85/51 - 177/98)  ABP(mean): 89 (08-11-17 @ 05:00) (63 - 128)  Exam: irregularly irregular  Cardiac Rhythm: a fib  Perfusion     [x]Adequate   [ ]Inadequate  Mentation    [x]Normal       [ ]Reduced  Extremities  [x]Warm         [ ]Cool  Volume Status [ ]Hypervolemic [ ]Euvolemic [x]Hypovolemic  Meds: metoprolol 50 milliGRAM(s) Oral every 12 hours    GI/NUTRITION  Exam: soft, mildly distended, RUQ tenderness to deep palpation  Diet: NPO with NGT to LCWS  Meds: pantoprazole  Injectable 40 milliGRAM(s) IV Push daily    GENITOURINARY    137  |  103  |  42<H>  ----------------------------<  167<H>  4.7   |  20<L>  |  1.64<H>    Ca    8.4      11 Aug 2017 02:36  Phos  4.4     08-11  Mg     2.2     08-11    TPro  6.0  /  Alb  2.6<L>  /  TBili  2.1<H>  /  DBili  1.4<H>  /  AST  51<H>  /  ALT  40  /  AlkPhos  75  08-11    [x] Reyez catheter, indication: urine output monitoring in the critically ill  Meds: dextrose 5% + sodium chloride 0.45% with potassium chloride 20 mEq/L at 125 cc/hr    HEMATOLOGIC  Meds: aspirin  chewable 81 milliGRAM(s) Oral daily  enoxaparin Injectable 30 milliGRAM(s) SubCutaneous daily  [x] VTE Prophylaxis                        11.1   28.4  )-----------( 208      ( 11 Aug 2017 02:36 )             34.6     PT/INR - ( 11 Aug 2017 02:36 )   PT: 21.4 sec;   INR: 1.94 ratio    PTT - ( 11 Aug 2017 02:36 )  PTT:36.1 sec    INFECTIOUS DISEASES  T(C): 37.2 (08-11-17 @ 03:00), Max: 37.3 (08-10-17 @ 23:00)  WBC Count: 28.4 K/uL (08-11 @ 02:36)  WBC Count: 28.5 K/uL (08-10 @ 18:51)  WBC Count: 30.6 K/uL (08-10 @ 09:37)  Recent Cultures: pending  Meds: none    ENDOCRINE  Capillary Blood Glucose  147 (10 Aug 2017 23:00)  102 (10 Aug 2017 18:00)  119 (10 Aug 2017 12:00)  Meds: insulin lispro (HumaLOG) corrective regimen sliding scale   SubCutaneous every 6 hours    ACCESS DEVICES:  [x] Peripheral IV  [x] Introducer	            [x] R	[ ] L	[ ] IJ	[x] Fem	[ ] SC	Placed: 8/8/2017  [x] Arterial Line		[ ] R	[x] L	[x] Fem	[ ] Rad	[ ] Ax	Placed: 8/8/2017  [ ] PICC:					[ ] Mediport  [x] Urinary Catheter, Date Placed: 8/8/2017  [x] Necessity of urinary, arterial, and venous catheters discussed    OTHER MEDICATIONS: none    CODE STATUS: full code    IMAGING: HISTORY OF PRESENT ILLNESS:  ELY WOOTEN is a 84y Female with h/o CHF, CAD s/p CABG, A.fib on Xarelto, PPM, MVR s/p clipping, HTN, HLD, DM who presented to ED after fall from standing x 2 in past 24hrs.  Pt denies LOC, admits to SOB and abdominal pain.  After +FAST in ED, pt underwent CTA which showed a 10.2cm complex liver cyst with high attenuation, concerning for active bleed. Pt taken to IR and underwent embolization of 4 vessels leading to cyst.  While in ED pt was tachycardic to 120s and received 1L bolus, 2 units PRBC, 2 FFP, 1500 K-centra and 10mg Vit K.  Emergent L femoral Introducer and A-line were placed.  In IR patient was intubated, briefly on Vitaly and received 4L crystalloid, 2 units PRBC, 6 FFP, 1 pack plts, and additional 1000 K-centra.  She underwent embolization of a large hemorrhagic liver mass. She was transferred intubated and sedated to the SICU.     24 HOUR EVENTS: Patient was successfully extubated this AM but is having a lot of secretions. Patient did have trouble phonating following extubated. ENT saw patient and scoped her but saw no vocal cord paralysis. They did note markedly decreased sensation of glottis that is likely to impair her ability to protect her airway but did not see any pooling of secretions and noted that her voice will likely improve once her pulmonary status improves. AICD was interrogated and no changes were recommended. Official echo was obtained that showed moderately reduced LV function, severely dilated LA, and grossly decreased RV function. WBC continues to be elevated at 28.4. Culture results pending.    SUBJECTIVE/ROS:  [x] A ten-point review of systems was otherwise negative except as noted.  [ ] Due to altered mental status/intubation, subjective information were not able to be obtained from the patient. History was obtained, to the extent possible, from review of the chart and collateral sources of information.    NEURO  CAM ICU: negative  Exam: awake, alert, oriented x3  Meds: IV acetaminophen  [x] Adequacy of sedation and pain control has been assessed and adjusted    RESPIRATORY  RR: 13 (08-11-17 @ 05:00) (12 - 24)  SpO2: 100% (08-11-17 @ 05:00) (93% - 100%)  Exam: clear to auscultation bilaterally  Mechanical Ventilation: no  [N/A] Extubation Readiness Assessed  ABG - ( 11 Aug 2017 02:32 )  pH: 7.37  /  pCO2: 39    /  pO2: 91    / HCO3: 22    / Base Excess: -2.6  /  SaO2: 97      Lactate: 1.5  Meds: none     CARDIOVASCULAR  HR: 98 (08-11-17 @ 05:00) (90 - 117)  BP: 132/60 (08-10-17 @ 19:15) (132/60 - 157/114)  BP(mean): 86 (08-10-17 @ 19:15) (6 - 128)  ABP: 124/68 (08-11-17 @ 05:00) (85/51 - 177/98)  ABP(mean): 89 (08-11-17 @ 05:00) (63 - 128)  Exam: irregularly irregular  Cardiac Rhythm: a fib  Perfusion     [x]Adequate   [ ]Inadequate  Mentation    [x]Normal       [ ]Reduced  Extremities  [x]Warm         [ ]Cool  Volume Status [ ]Hypervolemic [ ]Euvolemic [x]Hypovolemic  Meds: metoprolol 50 milliGRAM(s) Oral every 12 hours    GI/NUTRITION  Exam: soft, mildly distended, RUQ tenderness to deep palpation  Diet: NPO with NGT to LCWS  Meds: pantoprazole  Injectable 40 milliGRAM(s) IV Push daily    GENITOURINARY    I&O's Detail    10 Aug 2017 07:01  -  11 Aug 2017 07:00  --------------------------------------------------------  IN:    dextrose 5% + sodium chloride 0.45% with potassium chloride 20 mEq/L: 1955 mL    Enteral Tube Flush: 120 mL    Lactated Ringers IV Bolus: 1000 mL    phenylephrine   Infusion: 37.1 mL  Total IN: 3112.1 mL    OUT:    Indwelling Catheter - Urethral: 425 mL  Total OUT: 425 mL    Total NET: 2687.1 mL      11 Aug 2017 07:01  -  11 Aug 2017 07:58  --------------------------------------------------------  IN:    dextrose 5% + sodium chloride 0.45% with potassium chloride 20 mEq/L: 125 mL  Total IN: 125 mL    OUT:    Indwelling Catheter - Urethral: 20 mL  Total OUT: 20 mL    Total NET: 105 mL        137  |  103  |  42<H>  ----------------------------<  167<H>  4.7   |  20<L>  |  1.64<H>    Ca    8.4      11 Aug 2017 02:36  Phos  4.4     08-11  Mg     2.2     08-11    TPro  6.0  /  Alb  2.6<L>  /  TBili  2.1<H>  /  DBili  1.4<H>  /  AST  51<H>  /  ALT  40  /  AlkPhos  75  08-11    [x] Reyez catheter, indication: urine output monitoring in the critically ill  Meds: dextrose 5% + sodium chloride 0.45% with potassium chloride 20 mEq/L at 125 cc/hr    HEMATOLOGIC  Meds: aspirin  chewable 81 milliGRAM(s) Oral daily  enoxaparin Injectable 30 milliGRAM(s) SubCutaneous daily  [x] VTE Prophylaxis                        11.1   28.4  )-----------( 208      ( 11 Aug 2017 02:36 )             34.6     PT/INR - ( 11 Aug 2017 02:36 )   PT: 21.4 sec;   INR: 1.94 ratio    PTT - ( 11 Aug 2017 02:36 )  PTT:36.1 sec    INFECTIOUS DISEASES  T(C): 37.2 (08-11-17 @ 03:00), Max: 37.3 (08-10-17 @ 23:00)  WBC Count: 28.4 K/uL (08-11 @ 02:36)  WBC Count: 28.5 K/uL (08-10 @ 18:51)  WBC Count: 30.6 K/uL (08-10 @ 09:37)  Recent Cultures: pending  Meds: none    ENDOCRINE  Capillary Blood Glucose  147 (10 Aug 2017 23:00)  102 (10 Aug 2017 18:00)  119 (10 Aug 2017 12:00)  Meds: insulin lispro (HumaLOG) corrective regimen sliding scale   SubCutaneous every 6 hours    ACCESS DEVICES:  [x] Peripheral IV  [x] Introducer	            [x] R	[ ] L	[ ] IJ	[x] Fem	[ ] SC	Placed: 8/8/2017  [x] Arterial Line		[ ] R	[x] L	[x] Fem	[ ] Rad	[ ] Ax	Placed: 8/8/2017  [ ] PICC:					[ ] Mediport  [x] Urinary Catheter, Date Placed: 8/8/2017  [x] Necessity of urinary, arterial, and venous catheters discussed    OTHER MEDICATIONS: none    CODE STATUS: full code    IMAGING:

## 2017-08-11 NOTE — PROGRESS NOTE ADULT - ASSESSMENT
84 year old female s/p fall with bleeding 10.2 cm liver cyst s/p IR embolization    Neuro: post-procedure pain control  - IV acetaminophen and Dilaudid PRN    Resp: acute respiratory failure, now extubated  - Monitor pulse oximeter.  - Monitor CXR, assess for pulmonary edema.  - Pulmonary toileting, incentive spirometry, and out of bed to chair to prevent atelectasis    CV: hemorrhagic shock class I s/p resuscitation; h/o PAF on Xarelto, CHF, CAD s/p CABG, MVR s/p clipping, PPM, HTN, HLD  - Monitor vital signs.  - Metoprolol increased secondary to tachycardia  - Monitor volume status carefully    GI: h/o GERD, enteritis on CT  - NPO/NGT to LCWS. Bedside swallow today.  - Protonix for stress ulcer prophylaxis in the setting of coagulopathy    /Renal: CKD stage 3 with MYA  - Continue maintenance fluids at 125 cc/hr  - Strict I's/O's  - Monitor volume status, FeNA 0.3% and FeUrea 15%  - Monitor electrolytes and replete as necessary    Heme: hemorrhagic shock s/p IR embolization liver cyst  - Lovenox for VTE prophylaxis  - Monitor CBC and coags q8hrs    ID: afebrile  - Monitor for clinical evidence of active infection    Endo: h/o DM  - Monitor fingersticks and ISS for glycemic control    Disposition: full code, will remain in SICU for monitoring    Eva Toussaint PA-C   [id number] 84 year old female s/p fall with bleeding 10.2 cm liver cyst s/p IR embolization    Neuro: post-procedure pain control  - IV acetaminophen and Dilaudid PRN    Resp: acute respiratory failure, now extubated  - Monitor pulse oximeter.  - Monitor CXR, assess for pulmonary edema.  - Pulmonary toileting, incentive spirometry, and out of bed to chair to prevent atelectasis    CV: Afib with RVR on Xarelto, CHF, CAD s/p CABG, MVR s/p clipping, PPM, HTN, HLD  - Monitor vital signs.  - Metoprolol increased secondary to tachycardia  - Monitor volume status carefully    GI: h/o GERD, enteritis on CT  - NPO/NGT to LCWS. Bedside swallow today.  - Protonix for stress ulcer prophylaxis in the setting of coagulopathy    /Renal: CKD stage 3 with MYA  - Continue maintenance fluids at 125 cc/hr  - Strict I's/O's  - Monitor volume status, FeNA 0.3% and FeUrea 15%  - Monitor electrolytes and replete as necessary    Heme: hemorrhagic shock s/p IR embolization liver cyst  - Lovenox for VTE prophylaxis  - Monitor CBC and coags q8hrs    ID: afebrile  - Monitor for clinical evidence of active infection    Endo: h/o DM  - Monitor fingersticks and ISS for glycemic control    Disposition: full code, will remain in SICU for monitoring    Eva Toussaint PA-C   [id number]

## 2017-08-12 LAB
AMMONIA BLD-MCNC: 24 UMOL/L — SIGNIFICANT CHANGE UP (ref 11–55)
ANION GAP SERPL CALC-SCNC: 14 MMOL/L — SIGNIFICANT CHANGE UP (ref 5–17)
APPEARANCE UR: CLEAR — SIGNIFICANT CHANGE UP
BILIRUB UR-MCNC: NEGATIVE — SIGNIFICANT CHANGE UP
BUN SERPL-MCNC: 49 MG/DL — HIGH (ref 7–23)
CALCIUM SERPL-MCNC: 8.7 MG/DL — SIGNIFICANT CHANGE UP (ref 8.4–10.5)
CHLORIDE SERPL-SCNC: 103 MMOL/L — SIGNIFICANT CHANGE UP (ref 96–108)
CO2 SERPL-SCNC: 20 MMOL/L — LOW (ref 22–31)
COLOR SPEC: YELLOW — SIGNIFICANT CHANGE UP
CREAT SERPL-MCNC: 1.65 MG/DL — HIGH (ref 0.5–1.3)
DIFF PNL FLD: NEGATIVE — SIGNIFICANT CHANGE UP
GAS PNL BLDA: SIGNIFICANT CHANGE UP
GLUCOSE SERPL-MCNC: 132 MG/DL — HIGH (ref 70–99)
GLUCOSE UR QL: NEGATIVE — SIGNIFICANT CHANGE UP
HCT VFR BLD CALC: 32.2 % — LOW (ref 34.5–45)
HGB BLD-MCNC: 10.3 G/DL — LOW (ref 11.5–15.5)
KETONES UR-MCNC: NEGATIVE — SIGNIFICANT CHANGE UP
LEUKOCYTE ESTERASE UR-ACNC: ABNORMAL
MCHC RBC-ENTMCNC: 28.9 PG — SIGNIFICANT CHANGE UP (ref 27–34)
MCHC RBC-ENTMCNC: 32.1 GM/DL — SIGNIFICANT CHANGE UP (ref 32–36)
MCV RBC AUTO: 90.3 FL — SIGNIFICANT CHANGE UP (ref 80–100)
NITRITE UR-MCNC: NEGATIVE — SIGNIFICANT CHANGE UP
PH UR: 5 — SIGNIFICANT CHANGE UP (ref 5–8)
PLATELET # BLD AUTO: 148 K/UL — LOW (ref 150–400)
POTASSIUM SERPL-MCNC: 4.6 MMOL/L — SIGNIFICANT CHANGE UP (ref 3.5–5.3)
POTASSIUM SERPL-SCNC: 4.6 MMOL/L — SIGNIFICANT CHANGE UP (ref 3.5–5.3)
PROT UR-MCNC: NEGATIVE — SIGNIFICANT CHANGE UP
RBC # BLD: 3.57 M/UL — LOW (ref 3.8–5.2)
RBC # FLD: 15.2 % — HIGH (ref 10.3–14.5)
SODIUM SERPL-SCNC: 137 MMOL/L — SIGNIFICANT CHANGE UP (ref 135–145)
SP GR SPEC: 1.01 — LOW (ref 1.01–1.02)
UROBILINOGEN FLD QL: NEGATIVE — SIGNIFICANT CHANGE UP
WBC # BLD: 25.7 K/UL — HIGH (ref 3.8–10.5)
WBC # FLD AUTO: 25.7 K/UL — HIGH (ref 3.8–10.5)

## 2017-08-12 PROCEDURE — 71010: CPT | Mod: 26

## 2017-08-12 PROCEDURE — 99291 CRITICAL CARE FIRST HOUR: CPT

## 2017-08-12 PROCEDURE — 70450 CT HEAD/BRAIN W/O DYE: CPT | Mod: 26

## 2017-08-12 RX ORDER — FUROSEMIDE 40 MG
40 TABLET ORAL
Qty: 0 | Refills: 0 | Status: DISCONTINUED | OUTPATIENT
Start: 2017-08-12 | End: 2017-08-14

## 2017-08-12 RX ORDER — LIDOCAINE HCL 20 MG/ML
10 VIAL (ML) INJECTION ONCE
Qty: 0 | Refills: 0 | Status: DISCONTINUED | OUTPATIENT
Start: 2017-08-12 | End: 2017-08-12

## 2017-08-12 RX ADMIN — Medication 81 MILLIGRAM(S): at 11:02

## 2017-08-12 RX ADMIN — PANTOPRAZOLE SODIUM 40 MILLIGRAM(S): 20 TABLET, DELAYED RELEASE ORAL at 11:01

## 2017-08-12 RX ADMIN — Medication 1000 MILLIGRAM(S): at 00:21

## 2017-08-12 RX ADMIN — ENOXAPARIN SODIUM 30 MILLIGRAM(S): 100 INJECTION SUBCUTANEOUS at 11:02

## 2017-08-12 RX ADMIN — Medication 40 MILLIGRAM(S): at 21:21

## 2017-08-12 RX ADMIN — Medication 40 MILLIGRAM(S): at 12:57

## 2017-08-12 RX ADMIN — DEXTROSE MONOHYDRATE, SODIUM CHLORIDE, AND POTASSIUM CHLORIDE 50 MILLILITER(S): 50; .745; 4.5 INJECTION, SOLUTION INTRAVENOUS at 06:00

## 2017-08-12 RX ADMIN — Medication 50 MILLIGRAM(S): at 06:00

## 2017-08-12 RX ADMIN — Medication 400 MILLIGRAM(S): at 00:00

## 2017-08-12 RX ADMIN — Medication 50 MILLIGRAM(S): at 17:10

## 2017-08-12 NOTE — PROGRESS NOTE ADULT - ASSESSMENT
84 year old female s/p fall with bleeding 10.2 cm liver cyst s/p IR embolization    Neuro: post-procedure pain control  -IV tylenol for pain control  -Currently no PRN pain meds    Resp: acute respiratory failure, now extubated  - On BIPAP for increased work of breathing  - Monitor pulse oximeter.  - Monitor CXR, assess for pulmonary edema.  - Pulmonary toileting, incentive spirometry, and out of bed to chair to prevent atelectasis    CV: Afib with RVR on Xarelto, CHF, CAD s/p CABG, MVR s/p clipping, PPM, HTN, HLD  - Monitor vital signs.  - Metoprolol increased secondary to tachycardia  - Monitor volume status carefully    GI: h/o GERD, enteritis on CT  - Currently made NPO except medications  - Consider reinstating tube feeds at 20 once current respiratory issue is resolved  - Protonix for stress ulcer prophylaxis in the setting of coagulopathy    /Renal: CKD stage 3 with MYA  - Maintenance fluids decreased to 50 cc/hr due to volume overloaded status  - Lasix for diuresis when deemed appropriate  - Strict I's/O's  - Monitor volume status, FeNA 0.3% and FeUrea 15%  - Urine eosinophil study was negative  - Monitor electrolytes and replete as necessary    Heme: hemorrhagic shock s/p IR embolization liver cyst  - Lovenox for VTE prophylaxis  - Monitor CBC and coags q8hrs    ID: afebrile  - Monitor for clinical evidence of active infection    Endo: h/o DM  - Monitor fingersticks and ISS for glycemic control    Disposition: full code, will remain in SICU for monitoring    Eva Toussaint PA-C   [id number]

## 2017-08-12 NOTE — PROGRESS NOTE ADULT - SUBJECTIVE AND OBJECTIVE BOX
STATUS POST:  n/a      POST OPERATIVE DAY #: n/a     Vital Signs Last 24 Hrs  T(C): 36.8 (12 Aug 2017 07:00), Max: 36.8 (12 Aug 2017 07:00)  T(F): 98.2 (12 Aug 2017 07:00), Max: 98.2 (12 Aug 2017 07:00)  HR: 95 (12 Aug 2017 10:32) (73 - 114)  BP: 122/74 (12 Aug 2017 10:00) (101/55 - 164/97)  BP(mean): 90 (12 Aug 2017 10:00) (68 - 125)  RR: 22 (12 Aug 2017 10:00) (14 - 23)  SpO2: 100% (12 Aug 2017 10:32) (88% - 100%)    I&O's Summary    11 Aug 2017 07:01  -  12 Aug 2017 07:00  --------------------------------------------------------  IN: 1785 mL / OUT: 1325 mL / NET: 460 mL    12 Aug 2017 07:01  -  12 Aug 2017 10:58  --------------------------------------------------------  IN: 200 mL / OUT: 360 mL / NET: -160 mL        SUBJECTIVE: Pt seen at bedside. Patient resting on BiPAP. Afebrile overnight with tachycardia to 113    Pain: [ ] YES [ ] NO  Pain (0-10):              Pain Control Adequate: [ ] YES [ ] NO  SOB: [ ]YES [ ] NO  Chest Discomfort: [ ] YES [ ] NO    Nausea: [ ] YES [ ] NO           Vomiting: [ ] YES [ ] NO  Flatus: [ ] YES [ ] NO             Bowel Movement: [ ] YES [ ] NO     Void: [ ]YES [ ]No    Physical Exam:  General Appearance: Appears well, NAD  Neck: Supple  CV: Pulse regular presently  Abdomen: Soft, nontense, NTTP  Extremities: Grossly symmetric, SCD's in place     LABS:                        10.3   25.7  )-----------( 148      ( 12 Aug 2017 03:12 )             32.2     08-12    137  |  103  |  49<H>  ----------------------------<  132<H>  4.6   |  20<L>  |  1.65<H>    Ca    8.7      12 Aug 2017 03:12  Phos  4.4     08-11  Mg     2.2     08-11    TPro  6.0  /  Alb  2.6<L>  /  TBili  2.1<H>  /  DBili  1.4<H>  /  AST  51<H>  /  ALT  40  /  AlkPhos  75  08-11    PT/INR - ( 11 Aug 2017 10:57 )   PT: 20.9 sec;   INR: 1.89 ratio         PTT - ( 11 Aug 2017 10:57 )  PTT:35.5 sec      RADIOLOGY & ADDITIONAL STUDIES:  No new studies

## 2017-08-12 NOTE — PHYSICAL THERAPY INITIAL EVALUATION ADULT - PERTINENT HX OF CURRENT PROBLEM, REHAB EVAL
85yo F who presented to ED after fall from standing x 2 in past 24hrs. After +FAST in ED, pt underwent CTA which showed a 10.2cm complex liver cyst with high attenuation, concerning for active bleed. IR and underwent embolization of 4 vessels leading to cyst.

## 2017-08-12 NOTE — PROGRESS NOTE ADULT - SUBJECTIVE AND OBJECTIVE BOX
HISTORY  84y Female with h/o CHF, CAD s/p CABG, A.fib on Xarelto, PPM, MVR s/p clipping, HTN, HLD, DM who presented to ED after fall from standing x 2 in past 24hrs.  Pt denies LOC, admits to SOB and abdominal pain.  After +FAST in ED, pt underwent CTA which showed a 10.2cm complex liver cyst with high attenuation, concerning for active bleed. Pt taken to IR and underwent embolization of 4 vessels leading to cyst.  While in ED pt was tachycardic to 120s and received 1L bolus, 2 units PRBC, 2 FFP, 1500 K-centra and 10mg Vit K.  Emergent L femoral Introducer and A-line were placed.  In IR patient was intubated, briefly on Vitaly and received 4L crystalloid, 2 units PRBC, 6 FFP, 1 pack plts, and additional 1000 K-centra.  She underwent embolization of a large hemorrhagic liver mass. She was transferred intubated and sedated to the SICU.    24 HOUR EVENTS:     SUBJECTIVE/ROS:  [ ] A ten-point review of systems was otherwise negative except as noted.  [x ] Due to altered mental status/intubation, subjective information were not able to be obtained from the patient. History was obtained, to the extent possible, from review of the chart and collateral sources of information.      NEURO  Exam: Awake, minimally alert, minimally oriented  Meds:   [x] Adequacy of sedation and pain control has been assessed and adjusted      RESPIRATORY  RR: 16 (08-12-17 @ 04:00) (14 - 23)  SpO2: 99% (08-12-17 @ 04:19) (88% - 100%)  Wt(kg): --  Exam: unlabored, clear to auscultation bilaterally  Mechanical Ventilation:   ABG - ( 12 Aug 2017 02:47 )  pH: 7.39  /  pCO2: 37    /  pO2: 50    / HCO3: 22    / Base Excess: -1.6  /  SaO2: 83      Lactate: x                [ ] Extubation Readiness Assessed  Meds:       CARDIOVASCULAR  HR: 113 (08-12-17 @ 04:19) (84 - 114)  BP: 127/63 (08-12-17 @ 04:00) (101/55 - 164/97)  BP(mean): 88 (08-12-17 @ 04:00) (68 - 125)  ABP: 119/65 (08-11-17 @ 15:00) (111/66 - 159/94)  ABP(mean): 84 (08-11-17 @ 15:00) (78 - 118)  Wt(kg): --  CVP(cm H2O): --      Exam: RRR, no murmurs, no rubs, no gallops  Cardiac Rhythm: sinus  Perfusion     [ x]Adequate   [ ]Inadequate  Mentation   [x ]Normal       [ ]Reduced  Extremities  [x ]Warm         [ ]Cool  Volume Status [ ]Hypervolemic [ x]Euvolemic [ ]Hypovolemic  Meds: metoprolol 50 milliGRAM(s) Oral every 12 hours        GI/NUTRITION  Exam: firm abdomen, slightly distended, nontender  Diet: NPO except medications  Meds: pantoprazole   Suspension 40 milliGRAM(s) Oral daily      GENITOURINARY  I&O's Detail            08-12    137  |  103  |  49<H>  ----------------------------<  132<H>  4.6   |  20<L>  |  1.65<H>    Ca    8.7      12 Aug 2017 03:12  Phos  4.4     08-11  Mg     2.2     08-11    TPro  6.0  /  Alb  2.6<L>  /  TBili  2.1<H>  /  DBili  1.4<H>  /  AST  51<H>  /  ALT  40  /  AlkPhos  75  08-11    [x ] Reyez catheter, indication: N/A  Meds: dextrose 5% + sodium chloride 0.45% with potassium chloride 20 mEq/L 1000 milliLiter(s) IV Continuous <Continuous>        HEMATOLOGIC  Meds: aspirin  chewable 81 milliGRAM(s) Oral daily  enoxaparin Injectable 30 milliGRAM(s) SubCutaneous daily    [x] VTE Prophylaxis                        10.3   25.7  )-----------( 148      ( 12 Aug 2017 03:12 )             32.2     PT/INR - ( 11 Aug 2017 10:57 )   PT: 20.9 sec;   INR: 1.89 ratio         PTT - ( 11 Aug 2017 10:57 )  PTT:35.5 sec  Transfusion     [ ] PRBC   [ ] Platelets   [ ] FFP   [ ] Cryoprecipitate      INFECTIOUS DISEASES  T(C): 36.6 (08-12-17 @ 03:00), Max: 36.6 (08-11-17 @ 07:00)  Wt(kg): --  WBC Count: 25.7 K/uL (08-12 @ 03:12)  WBC Count: 27.7 K/uL (08-11 @ 10:57)    Recent Cultures:    Meds:       ENDOCRINE  Capillary Blood Glucose  105 (11 Aug 2017 17:00)  116 (11 Aug 2017 11:40)    Meds: insulin lispro (HumaLOG) corrective regimen sliding scale   SubCutaneous every 6 hours        ACCESS DEVICES:  [x ] Peripheral IV  [ ] Central Venous Line	[ ] R	[ ] L	[ ] IJ	[ ] Fem	[ ] SC	Placed:   [ ] Arterial Line		[ ] R	[ ] L	[ ] Fem	[ ] Rad	[ ] Ax	Placed:   [ ] PICC:					[ ] Mediport  [ x] Urinary Catheter, Date Placed:   [ ] Necessity of urinary, arterial, and venous catheters discussed    OTHER MEDICATIONS:      CODE STATUS: Full code    IMAGING: HISTORY  84y Female with h/o CHF, CAD s/p CABG, A.fib on Xarelto, PPM, MVR s/p clipping, HTN, HLD, DM who presented to ED after fall from standing x 2 in past 24hrs.  Pt denies LOC, admits to SOB and abdominal pain.  After +FAST in ED, pt underwent CTA which showed a 10.2cm complex liver cyst with high attenuation, concerning for active bleed. Pt taken to IR and underwent embolization of 4 vessels leading to cyst.  While in ED pt was tachycardic to 120s and received 1L bolus, 2 units PRBC, 2 FFP, 1500 K-centra and 10mg Vit K.  Emergent L femoral Introducer and A-line were placed.  In IR patient was intubated, briefly on Vitaly and received 4L crystalloid, 2 units PRBC, 6 FFP, 1 pack plts, and additional 1000 K-centra.  She underwent embolization of a large hemorrhagic liver mass. She was transferred intubated and sedated to the SICU.    24 HOUR EVENTS: Yesterday the patient appeared fluid overloaded with a slightly worsening pulmonary congestion on CXR, but seemed to be doing quite well in terms of mentation, saturation, and work of breathing. As a result, her femoral and a-line were d/gloria, and her trophic feeds were increased to 20. She also received 40 mg of Lasix to help diurese, with the plan being to give another 40 mg 12 hrs later. However, this evening the patient appeared to be breathing with more difficulty and complaining of SOB. This on top of her very edematous and fluid overloaded state prompted her to receive 5 mg of Metolazone followed by 80 mg of Lasix 30 min later    SUBJECTIVE/ROS:  [ ] A ten-point review of systems was otherwise negative except as noted.  [x ] Due to altered mental status/intubation, subjective information were not able to be obtained from the patient. History was obtained, to the extent possible, from review of the chart and collateral sources of information.      NEURO  Exam: Awake, minimally alert, minimally oriented  Meds:   [x] Adequacy of sedation and pain control has been assessed and adjusted      RESPIRATORY  RR: 16 (08-12-17 @ 04:00) (14 - 23)  SpO2: 99% (08-12-17 @ 04:19) (88% - 100%)  Wt(kg): --  Exam: unlabored, clear to auscultation bilaterally  Mechanical Ventilation:   ABG - ( 12 Aug 2017 02:47 )  pH: 7.39  /  pCO2: 37    /  pO2: 50    / HCO3: 22    / Base Excess: -1.6  /  SaO2: 83      Lactate: x                [ ] Extubation Readiness Assessed  Meds:       CARDIOVASCULAR  HR: 113 (08-12-17 @ 04:19) (84 - 114)  BP: 127/63 (08-12-17 @ 04:00) (101/55 - 164/97)  BP(mean): 88 (08-12-17 @ 04:00) (68 - 125)  ABP: 119/65 (08-11-17 @ 15:00) (111/66 - 159/94)  ABP(mean): 84 (08-11-17 @ 15:00) (78 - 118)  Wt(kg): --  CVP(cm H2O): --      Exam: RRR, no murmurs, no rubs, no gallops  Cardiac Rhythm: sinus  Perfusion     [ x]Adequate   [ ]Inadequate  Mentation   [x ]Normal       [ ]Reduced  Extremities  [x ]Warm         [ ]Cool  Volume Status [ ]Hypervolemic [ x]Euvolemic [ ]Hypovolemic  Meds: metoprolol 50 milliGRAM(s) Oral every 12 hours        GI/NUTRITION  Exam: firm abdomen, slightly distended, nontender  Diet: NPO except medications  Meds: pantoprazole   Suspension 40 milliGRAM(s) Oral daily      GENITOURINARY  I&O's Detail            08-12    137  |  103  |  49<H>  ----------------------------<  132<H>  4.6   |  20<L>  |  1.65<H>    Ca    8.7      12 Aug 2017 03:12  Phos  4.4     08-11  Mg     2.2     08-11    TPro  6.0  /  Alb  2.6<L>  /  TBili  2.1<H>  /  DBili  1.4<H>  /  AST  51<H>  /  ALT  40  /  AlkPhos  75  08-11    [x ] Reyez catheter, indication: N/A  Meds: dextrose 5% + sodium chloride 0.45% with potassium chloride 20 mEq/L 1000 milliLiter(s) IV Continuous <Continuous>        HEMATOLOGIC  Meds: aspirin  chewable 81 milliGRAM(s) Oral daily  enoxaparin Injectable 30 milliGRAM(s) SubCutaneous daily    [x] VTE Prophylaxis                        10.3   25.7  )-----------( 148      ( 12 Aug 2017 03:12 )             32.2     PT/INR - ( 11 Aug 2017 10:57 )   PT: 20.9 sec;   INR: 1.89 ratio         PTT - ( 11 Aug 2017 10:57 )  PTT:35.5 sec  Transfusion     [ ] PRBC   [ ] Platelets   [ ] FFP   [ ] Cryoprecipitate      INFECTIOUS DISEASES  T(C): 36.6 (08-12-17 @ 03:00), Max: 36.6 (08-11-17 @ 07:00)  Wt(kg): --  WBC Count: 25.7 K/uL (08-12 @ 03:12)  WBC Count: 27.7 K/uL (08-11 @ 10:57)    Recent Cultures:    Meds:       ENDOCRINE  Capillary Blood Glucose  105 (11 Aug 2017 17:00)  116 (11 Aug 2017 11:40)    Meds: insulin lispro (HumaLOG) corrective regimen sliding scale   SubCutaneous every 6 hours        ACCESS DEVICES:  [x ] Peripheral IV  [ ] Central Venous Line	[ ] R	[ ] L	[ ] IJ	[ ] Fem	[ ] SC	Placed:   [ ] Arterial Line		[ ] R	[ ] L	[ ] Fem	[ ] Rad	[ ] Ax	Placed:   [ ] PICC:					[ ] Mediport  [ x] Urinary Catheter, Date Placed:   [ ] Necessity of urinary, arterial, and venous catheters discussed    OTHER MEDICATIONS:      CODE STATUS: Full code    IMAGING: HISTORY  84y Female with h/o CHF, CAD s/p CABG, A.fib on Xarelto, PPM, MVR s/p clipping, HTN, HLD, DM who presented to ED after fall from standing x 2 in past 24hrs.  Pt denies LOC, admits to SOB and abdominal pain.  After +FAST in ED, pt underwent CTA which showed a 10.2cm complex liver cyst with high attenuation, concerning for active bleed. Pt taken to IR and underwent embolization of 4 vessels leading to cyst.  While in ED pt was tachycardic to 120s and received 1L bolus, 2 units PRBC, 2 FFP, 1500 K-centra and 10mg Vit K.  Emergent L femoral Introducer and A-line were placed.  In IR patient was intubated, briefly on Vitaly and received 4L crystalloid, 2 units PRBC, 6 FFP, 1 pack plts, and additional 1000 K-centra.  She underwent embolization of a large hemorrhagic liver mass. She was transferred intubated and sedated to the SICU.    24 HOUR EVENTS: Yesterday the patient appeared fluid overloaded with a slightly worsening pulmonary congestion on CXR, but seemed to be doing quite well in terms of mentation, saturation, and work of breathing. As a result, her femoral and a-line were d/gloria, and her trophic feeds were increased to 20. She also received 40 mg of Lasix to help diurese, with the plan being to give another 40 mg 12 hrs later. However, this evening the patient appeared to be breathing with more difficulty and complaining of SOB. This on top of her very edematous and fluid overloaded state prompted her to receive 5 mg of Metolazone followed by 80 mg of Lasix 30 min later. Patient also has struggled to maintain good IV access; her edematous state has made placing more IVs very difficult, even under ultrasound guidance. Currently she has only one IV placed.    SUBJECTIVE/ROS:  [ ] A ten-point review of systems was otherwise negative except as noted.  [x ] Due to altered mental status/intubation, subjective information were not able to be obtained from the patient. History was obtained, to the extent possible, from review of the chart and collateral sources of information.      NEURO  Exam: Awake, minimally alert, minimally oriented  Meds:   [x] Adequacy of sedation and pain control has been assessed and adjusted      RESPIRATORY  RR: 16 (08-12-17 @ 04:00) (14 - 23)  SpO2: 99% (08-12-17 @ 04:19) (88% - 100%)  Wt(kg): --  Exam: unlabored, clear to auscultation bilaterally  Mechanical Ventilation:   ABG - ( 12 Aug 2017 02:47 )  pH: 7.39  /  pCO2: 37    /  pO2: 50    / HCO3: 22    / Base Excess: -1.6  /  SaO2: 83      Lactate: x                [ ] Extubation Readiness Assessed  Meds:       CARDIOVASCULAR  HR: 113 (08-12-17 @ 04:19) (84 - 114)  BP: 127/63 (08-12-17 @ 04:00) (101/55 - 164/97)  BP(mean): 88 (08-12-17 @ 04:00) (68 - 125)  ABP: 119/65 (08-11-17 @ 15:00) (111/66 - 159/94)  ABP(mean): 84 (08-11-17 @ 15:00) (78 - 118)  Wt(kg): --  CVP(cm H2O): --      Exam: RRR, no murmurs, no rubs, no gallops  Cardiac Rhythm: sinus  Perfusion     [ x]Adequate   [ ]Inadequate  Mentation   [x ]Normal       [ ]Reduced  Extremities  [x ]Warm         [ ]Cool  Volume Status [ ]Hypervolemic [ x]Euvolemic [ ]Hypovolemic  Meds: metoprolol 50 milliGRAM(s) Oral every 12 hours        GI/NUTRITION  Exam: firm abdomen, slightly distended, nontender  Diet: NPO except medications  Meds: pantoprazole   Suspension 40 milliGRAM(s) Oral daily      GENITOURINARY  I&O's Detail            08-12    137  |  103  |  49<H>  ----------------------------<  132<H>  4.6   |  20<L>  |  1.65<H>    Ca    8.7      12 Aug 2017 03:12  Phos  4.4     08-11  Mg     2.2     08-11    TPro  6.0  /  Alb  2.6<L>  /  TBili  2.1<H>  /  DBili  1.4<H>  /  AST  51<H>  /  ALT  40  /  AlkPhos  75  08-11    [x ] Reyez catheter, indication: N/A  Meds: dextrose 5% + sodium chloride 0.45% with potassium chloride 20 mEq/L 1000 milliLiter(s) IV Continuous <Continuous>        HEMATOLOGIC  Meds: aspirin  chewable 81 milliGRAM(s) Oral daily  enoxaparin Injectable 30 milliGRAM(s) SubCutaneous daily    [x] VTE Prophylaxis                        10.3   25.7  )-----------( 148      ( 12 Aug 2017 03:12 )             32.2     PT/INR - ( 11 Aug 2017 10:57 )   PT: 20.9 sec;   INR: 1.89 ratio         PTT - ( 11 Aug 2017 10:57 )  PTT:35.5 sec  Transfusion     [ ] PRBC   [ ] Platelets   [ ] FFP   [ ] Cryoprecipitate      INFECTIOUS DISEASES  T(C): 36.6 (08-12-17 @ 03:00), Max: 36.6 (08-11-17 @ 07:00)  Wt(kg): --  WBC Count: 25.7 K/uL (08-12 @ 03:12)  WBC Count: 27.7 K/uL (08-11 @ 10:57)    Recent Cultures:    Meds:       ENDOCRINE  Capillary Blood Glucose  105 (11 Aug 2017 17:00)  116 (11 Aug 2017 11:40)    Meds: insulin lispro (HumaLOG) corrective regimen sliding scale   SubCutaneous every 6 hours        ACCESS DEVICES:  [x ] Peripheral IV  [ ] Central Venous Line	[ ] R	[ ] L	[ ] IJ	[ ] Fem	[ ] SC	Placed:   [ ] Arterial Line		[ ] R	[ ] L	[ ] Fem	[ ] Rad	[ ] Ax	Placed:   [ ] PICC:					[ ] Mediport  [ x] Urinary Catheter, Date Placed:   [ ] Necessity of urinary, arterial, and venous catheters discussed    OTHER MEDICATIONS:      CODE STATUS: Full code    IMAGING:

## 2017-08-12 NOTE — PHYSICAL THERAPY INITIAL EVALUATION ADULT - ADDITIONAL COMMENTS
as per social work note, pt lives in a house alone with 5 steps to enter. PTA she was amb with a RW independently. she has a HHA that assists with ADLs

## 2017-08-12 NOTE — PROGRESS NOTE ADULT - SUBJECTIVE AND OBJECTIVE BOX
HISTORY OF PRESENT ILLNESS:  ELY WOOTEN is a 84y Female with h/o CHF, CAD s/p CABG, A.fib on Xarelto, PPM, MVR s/p clipping, HTN, HLD, DM who presented to ED after fall from standing x 2 in past 24hrs.  Pt denies LOC, admits to SOB and abdominal pain.  After +FAST in ED, pt underwent CTA which showed a 10.2cm complex liver cyst with high attenuation, concerning for active bleed. Pt taken to IR and underwent embolization of 4 vessels leading to cyst.  While in ED pt was tachycardic to 120s and received 1L bolus, 2 units PRBC, 2 FFP, 1500 K-centra and 10mg Vit K.  Emergent L femoral Introducer and A-line were placed.  In IR patient was intubated, briefly on Vitaly and received 4L crystalloid, 2 units PRBC, 6 FFP, 1 pack plts, and additional 1000 K-centra.  She underwent embolization of a large hemorrhagic liver mass. She was transferred intubated and sedated to the SICU. She is now extubated.    24 HOUR EVENTS: Patient was given 40 lasix IV yesterday and made 590 of urine during day. Appeared to have slight increase in work of breathing overnight, prompting 5 metolazone and and 80 of IV lasix to which she responded to but not as well. TF were continued at 20, but held overnight. HISTORY OF PRESENT ILLNESS:  ELY WOOTEN is a 84y Female with h/o CHF, CAD s/p CABG, A.fib on Xarelto, PPM, MVR s/p clipping, HTN, HLD, DM who presented to ED after fall from standing x 2 in past 24hrs.  Pt denies LOC, admits to SOB and abdominal pain.  After +FAST in ED, pt underwent CTA which showed a 10.2cm complex liver cyst with high attenuation, concerning for active bleed. Pt taken to IR and underwent embolization of 4 vessels leading to cyst.  While in ED pt was tachycardic to 120s and received 1L bolus, 2 units PRBC, 2 FFP, 1500 K-centra and 10mg Vit K.  Emergent L femoral Introducer and A-line were placed.  In IR patient was intubated, briefly on Vitaly and received 4L crystalloid, 2 units PRBC, 6 FFP, 1 pack plts, and additional 1000 K-centra.  She underwent embolization of a large hemorrhagic liver mass. She was transferred intubated and sedated to the SICU. She is now extubated.    24 HOUR EVENTS: Patient was given 40 lasix IV yesterday and made 590 of urine during day. Appeared to have slight increase in work of breathing overnight, prompting 5 metolazone and and 80 of IV lasix to which she responded to moderately. TF were continued at 20, but held overnight. As the night continued she was placed on Bipap to aid her work of breathing.    SUBJECTIVE/ROS:  [ ] A ten-point review of systems was otherwise negative except as noted.  [ x] Due to altered mental status/intubation, subjective information were not able to be obtained from the patient. History was obtained, to the extent possible, from review of the chart and collateral sources of information.      NEURO  Exam: Awake, responsive, AOx1 at best  Meds: IV tylenol PRN for pain control  [x] Adequacy of sedation and pain control has been assessed and adjusted      RESPIRATORY  RR: 17 (08-12-17 @ 08:00) (14 - 23)  SpO2: 100% (08-12-17 @ 08:19) (88% - 100%)  Exam: unlabored, clear to auscultation bilaterally  Mechanical Ventilation:   ABG - ( 12 Aug 2017 02:47 )  pH: 7.39  /  pCO2: 37    /  pO2: 50    / HCO3: 22    / Base Excess: -1.6  /  SaO2: 83          CARDIOVASCULAR  HR: 110 (08-12-17 @ 08:19) (86 - 114)  BP: 129/61 (08-12-17 @ 08:00) (101/55 - 164/97)  BP(mean): 85 (08-12-17 @ 08:00) (68 - 125)  ABP: 119/65 (08-11-17 @ 15:00) (111/66 - 143/82)  ABP(mean): 84 (08-11-17 @ 15:00) (78 - 105)  Exam:  Cardiac Rhythm:  Perfusion     [ ]Adequate   [x ]Inadequate  Mentation   [ ]Normal       [ x]Reduced  Extremities  [ ]Warm         [ x]Cool  Volume Status [x ]Hypervolemic [ ]Euvolemic [ ]Hypovolemic  Meds: metoprolol 50 milliGRAM(s) Oral every 12 hours        GI/NUTRITION  Exam: softly distended, nontender  Diet: TF   Meds: pantoprazole   Suspension 40 milliGRAM(s) Oral daily      GENITOURINARY  I&O's Detail    08-11 @ 07:01  -  08-12 @ 07:00  --------------------------------------------------------  IN:    dextrose 5% + sodium chloride 0.45% with potassium chloride 20 mEq/L: 1575 mL    Enteral Tube Flush: 30 mL    Glucerna: 80 mL    IV PiggyBack: 100 mL  Total IN: 1785 mL    OUT:    Indwelling Catheter - Urethral: 1325 mL  Total OUT: 1325 mL    Total NET: 460 mL      08-12 @ 07:01  -  08-12 @ 09:28  --------------------------------------------------------  IN:    dextrose 5% + sodium chloride 0.45% with potassium chloride 20 mEq/L: 50 mL  Total IN: 50 mL    OUT:    Indwelling Catheter - Urethral: 75 mL  Total OUT: 75 mL    Total NET: -25 mL    08-12    137  |  103  |  49<H>  ----------------------------<  132<H>  4.6   |  20<L>  |  1.65<H>    Ca    8.7      12 Aug 2017 03:12  Phos  4.4     08-11  Mg     2.2     08-11    TPro  6.0  /  Alb  2.6<L>  /  TBili  2.1<H>  /  DBili  1.4<H>  /  AST  51<H>  /  ALT  40  /  AlkPhos  75  08-11    [ ] Reyez catheter, indication: N/A  Meds: dextrose 5% + sodium chloride 0.45% with potassium chloride 20 mEq/L 1000 milliLiter(s) IV Continuous <Continuous>      HEMATOLOGIC  Meds: aspirin  chewable 81 milliGRAM(s) Oral daily  enoxaparin Injectable 30 milliGRAM(s) SubCutaneous daily    [x] VTE Prophylaxis                        10.3   25.7  )-----------( 148      ( 12 Aug 2017 03:12 )             32.2     PT/INR - ( 11 Aug 2017 10:57 )   PT: 20.9 sec;   INR: 1.89 ratio         PTT - ( 11 Aug 2017 10:57 )  PTT:35.5 sec  Transfusion     [ ] PRBC   [ ] Platelets   [ ] FFP   [ ] Cryoprecipitate      INFECTIOUS DISEASES  T(C): 36.8 (08-12-17 @ 07:00), Max: 36.8 (08-12-17 @ 07:00)  Wt(kg): --  WBC Count: 25.7 K/uL (08-12 @ 03:12)  WBC Count: 27.7 K/uL (08-11 @ 10:57)      ENDOCRINE  Capillary Blood Glucose  105 (11 Aug 2017 17:00)  116 (11 Aug 2017 11:40)    Meds: insulin lispro (HumaLOG) corrective regimen sliding scale   SubCutaneous every 6 hours        ACCESS DEVICES:  [ x] Peripheral IV  [ ] Central Venous Line	[ ] R	[ ] L	[ ] IJ	[ ] Fem	[ ] SC	Placed:   [ ] Arterial Line		[ ] R	[ ] L	[ ] Fem	[ ] Rad	[ ] Ax	Placed:   [ ] PICC:					[ ] Mediport  [x ] Urinary Catheter, Date Placed:   [ ] Necessity of urinary, arterial, and venous catheters discussed    OTHER MEDICATIONS:      CODE STATUS: FULL    IMAGING: CXR pending

## 2017-08-12 NOTE — PROGRESS NOTE ADULT - ASSESSMENT
84 year old female s/p fall with bleeding 10.2 cm liver cyst s/p IR embolization    Neuro: post-procedure pain control  - IV acetaminophen and Dilaudid PRN    Resp: acute respiratory failure, now extubated  - Monitor pulse oximeter.  - Monitor CXR, assess for pulmonary edema.  - Pulmonary toileting, incentive spirometry, and out of bed to chair to prevent atelectasis    CV: Afib with RVR on Xarelto, CHF, CAD s/p CABG, MVR s/p clipping, PPM, HTN, HLD  - Monitor vital signs.  - Metoprolol increased secondary to tachycardia  - Monitor volume status carefully    GI: h/o GERD, enteritis on CT  - NPO/NGT to LCWS. Bedside swallow today.  - Protonix for stress ulcer prophylaxis in the setting of coagulopathy    /Renal: CKD stage 3 with MYA  - Continue maintenance fluids at 125 cc/hr  - Strict I's/O's  - Monitor volume status, FeNA 0.3% and FeUrea 15%  - Monitor electrolytes and replete as necessary    Heme: hemorrhagic shock s/p IR embolization liver cyst  - Lovenox for VTE prophylaxis  - Monitor CBC and coags q8hrs    ID: afebrile  - Monitor for clinical evidence of active infection    Endo: h/o DM  - Monitor fingersticks and ISS for glycemic control    Disposition: full code, will remain in SICU for monitoring    Eva Toussaint PA-C   [id number] 84 year old female s/p fall with bleeding 10.2 cm liver cyst s/p IR embolization    Neuro: post-procedure pain control  - IV acetaminophen prn    Resp: acute respiratory failure, now extubated  - Monitor pulse oximeter.  - Monitor CXR, assess for pulmonary edema.  - Pulmonary toileting, incentive spirometry, and out of bed to chair to prevent atelectasis    CV: Afib with RVR on Xarelto, CHF, CAD s/p CABG, MVR s/p clipping, PPM, HTN, HLD  - Monitor vital signs.  - Metoprolol increased secondary to tachycardia  - Monitor volume status carefully    GI: h/o GERD, enteritis on CT  - NPO / NGT to LCWS.   - Protonix for stress ulcer prophylaxis     /Renal: CKD stage 3 with MYA  - Strict I's/O's  - Monitor volume status previously managed FeNA 0.3% and FeUrea 15%  - Monitor electrolytes and replete as necessary    Heme: hemorrhagic shock s/p IR embolization liver cyst  - Lovenox for VTE prophylaxis  - Monitor CBC and coags     ID: afebrile  - Monitor for clinical evidence of active infection    Endo: h/o DM  - Monitor fingersticks and ISS for glycemic control    Disposition: full code, will remain in SICU for monitoring    Melba Chong PGY 2 [id number] 84 year old female s/p fall with bleeding 10.2 cm liver cyst s/p IR embolization    Neuro: post-procedure pain control  - IV acetaminophen prn    Resp: acute respiratory failure, now extubated  - Monitor pulse oximeter.  - Monitor CXR, assess for pulmonary edema.  - Pulmonary toileting, incentive spirometry, and out of bed to chair to prevent atelectasis    CV: Afib with RVR on Xarelto, CHF, CAD s/p CABG, MVR s/p clipping, PPM, HTN, HLD  - Monitor vital signs.  - Metoprolol increased secondary to tachycardia  - Monitor volume status carefully    GI: h/o GERD, enteritis on CT  - NPO / NGT to LCWS.   - Protonix for stress ulcer prophylaxis     /Renal: CKD stage 3 with MYA  - Strict I's/O's  - Monitor volume status previously managed FeNA 0.3% and FeUrea 15% pre-renal but volume overloaded on exam.  Will start lasix 40 IV BID.  - Monitor electrolytes and replete as necessary    Heme: hemorrhagic shock s/p IR embolization liver cyst  - Lovenox for VTE prophylaxis  - Monitor CBC and coags     ID: afebrile  - Monitor for clinical evidence of active infection    Endo: h/o DM  - Monitor fingersticks and ISS for glycemic control    Disposition: full code, will remain in SICU for monitoring    Melba Chong PGY 2 [id number]

## 2017-08-12 NOTE — PROGRESS NOTE ADULT - ASSESSMENT
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  -Leukocytosis worsening, WBC 25 (08/10217) down from yesterday, 27  Patient is afebrile, continue to trend WBCs per SICU recommendations  - continue to monitor volume status/UOP

## 2017-08-13 LAB
ALBUMIN SERPL ELPH-MCNC: 2.4 G/DL — LOW (ref 3.3–5)
ALP SERPL-CCNC: 80 U/L — SIGNIFICANT CHANGE UP (ref 40–120)
ALT FLD-CCNC: 24 U/L RC — SIGNIFICANT CHANGE UP (ref 10–45)
ANION GAP SERPL CALC-SCNC: 16 MMOL/L — SIGNIFICANT CHANGE UP (ref 5–17)
APTT BLD: 34.5 SEC — SIGNIFICANT CHANGE UP (ref 27.5–37.4)
AST SERPL-CCNC: 21 U/L — SIGNIFICANT CHANGE UP (ref 10–40)
BILIRUB DIRECT SERPL-MCNC: 1.4 MG/DL — HIGH (ref 0–0.2)
BILIRUB INDIRECT FLD-MCNC: 0.8 MG/DL — SIGNIFICANT CHANGE UP (ref 0.2–1)
BILIRUB SERPL-MCNC: 2.2 MG/DL — HIGH (ref 0.2–1.2)
BUN SERPL-MCNC: 54 MG/DL — HIGH (ref 7–23)
CA-I BLD-SCNC: 1.19 MMOL/L — SIGNIFICANT CHANGE UP (ref 1.12–1.3)
CALCIUM SERPL-MCNC: 8.6 MG/DL — SIGNIFICANT CHANGE UP (ref 8.4–10.5)
CHLORIDE SERPL-SCNC: 101 MMOL/L — SIGNIFICANT CHANGE UP (ref 96–108)
CO2 SERPL-SCNC: 23 MMOL/L — SIGNIFICANT CHANGE UP (ref 22–31)
CREAT SERPL-MCNC: 1.61 MG/DL — HIGH (ref 0.5–1.3)
GAS PNL BLDA: SIGNIFICANT CHANGE UP
GLUCOSE SERPL-MCNC: 117 MG/DL — HIGH (ref 70–99)
HCT VFR BLD CALC: 31.4 % — LOW (ref 34.5–45)
HGB BLD-MCNC: 10.4 G/DL — LOW (ref 11.5–15.5)
INR BLD: 1.65 RATIO — HIGH (ref 0.88–1.16)
MAGNESIUM SERPL-MCNC: 2 MG/DL — SIGNIFICANT CHANGE UP (ref 1.6–2.6)
MCHC RBC-ENTMCNC: 29.9 PG — SIGNIFICANT CHANGE UP (ref 27–34)
MCHC RBC-ENTMCNC: 33.2 GM/DL — SIGNIFICANT CHANGE UP (ref 32–36)
MCV RBC AUTO: 89.9 FL — SIGNIFICANT CHANGE UP (ref 80–100)
PHOSPHATE SERPL-MCNC: 4.6 MG/DL — HIGH (ref 2.5–4.5)
PLATELET # BLD AUTO: 174 K/UL — SIGNIFICANT CHANGE UP (ref 150–400)
POTASSIUM SERPL-MCNC: 3.5 MMOL/L — SIGNIFICANT CHANGE UP (ref 3.5–5.3)
POTASSIUM SERPL-SCNC: 3.5 MMOL/L — SIGNIFICANT CHANGE UP (ref 3.5–5.3)
PROT SERPL-MCNC: 5.8 G/DL — LOW (ref 6–8.3)
PROTHROM AB SERPL-ACNC: 18.2 SEC — HIGH (ref 9.8–12.7)
RBC # BLD: 3.49 M/UL — LOW (ref 3.8–5.2)
RBC # FLD: 15.1 % — HIGH (ref 10.3–14.5)
SODIUM SERPL-SCNC: 140 MMOL/L — SIGNIFICANT CHANGE UP (ref 135–145)
WBC # BLD: 18 K/UL — HIGH (ref 3.8–10.5)
WBC # FLD AUTO: 18 K/UL — HIGH (ref 3.8–10.5)

## 2017-08-13 PROCEDURE — 71010: CPT | Mod: 26

## 2017-08-13 PROCEDURE — 99233 SBSQ HOSP IP/OBS HIGH 50: CPT | Mod: GC

## 2017-08-13 PROCEDURE — 71010: CPT | Mod: 26,77

## 2017-08-13 PROCEDURE — 74000: CPT | Mod: 26

## 2017-08-13 RX ORDER — ACETAMINOPHEN 500 MG
650 TABLET ORAL ONCE
Qty: 0 | Refills: 0 | Status: COMPLETED | OUTPATIENT
Start: 2017-08-13 | End: 2017-08-13

## 2017-08-13 RX ORDER — POTASSIUM CHLORIDE 20 MEQ
10 PACKET (EA) ORAL
Qty: 0 | Refills: 0 | Status: COMPLETED | OUTPATIENT
Start: 2017-08-13 | End: 2017-08-13

## 2017-08-13 RX ORDER — POLYETHYLENE GLYCOL 3350 17 G/17G
17 POWDER, FOR SOLUTION ORAL DAILY
Qty: 0 | Refills: 0 | Status: DISCONTINUED | OUTPATIENT
Start: 2017-08-13 | End: 2017-08-15

## 2017-08-13 RX ADMIN — Medication 81 MILLIGRAM(S): at 11:16

## 2017-08-13 RX ADMIN — Medication 100 MILLIEQUIVALENT(S): at 06:09

## 2017-08-13 RX ADMIN — PANTOPRAZOLE SODIUM 40 MILLIGRAM(S): 20 TABLET, DELAYED RELEASE ORAL at 11:16

## 2017-08-13 RX ADMIN — Medication 40 MILLIGRAM(S): at 05:22

## 2017-08-13 RX ADMIN — Medication 100 MILLIEQUIVALENT(S): at 06:13

## 2017-08-13 RX ADMIN — Medication 650 MILLIGRAM(S): at 16:38

## 2017-08-13 RX ADMIN — ENOXAPARIN SODIUM 30 MILLIGRAM(S): 100 INJECTION SUBCUTANEOUS at 11:16

## 2017-08-13 RX ADMIN — POLYETHYLENE GLYCOL 3350 17 GRAM(S): 17 POWDER, FOR SOLUTION ORAL at 05:58

## 2017-08-13 RX ADMIN — Medication 50 MILLIGRAM(S): at 05:23

## 2017-08-13 RX ADMIN — Medication 40 MILLIGRAM(S): at 17:09

## 2017-08-13 RX ADMIN — Medication 100 MILLIEQUIVALENT(S): at 05:23

## 2017-08-13 RX ADMIN — Medication 50 MILLIGRAM(S): at 17:39

## 2017-08-13 RX ADMIN — Medication 650 MILLIGRAM(S): at 17:08

## 2017-08-13 NOTE — PROGRESS NOTE ADULT - SUBJECTIVE AND OBJECTIVE BOX
HISTORY   84y Female with h/o CHF, CAD s/p CABG, A.fib on Xarelto, PPM, MVR s/p clipping, HTN, HLD, DM who presented to ED after fall from standing x 2 in past 24hrs.  Pt denies LOC, admits to SOB and abdominal pain.  After +FAST in ED, pt underwent CTA which showed a 10.2cm complex liver cyst with high attenuation, concerning for active bleed. Pt taken to IR and underwent embolization of 4 vessels leading to cyst.  While in ED pt was tachycardic to 120s and received 1L bolus, 2 units PRBC, 2 FFP, 1500 K-centra and 10mg Vit K.  Emergent L femoral Introducer and A-line were placed.  In IR patient was intubated, briefly on Vitaly and received 4L crystalloid, 2 units PRBC, 6 FFP, 1 pack plts, and additional 1000 K-centra.  She underwent embolization of a large hemorrhagic liver mass. She was transferred intubated and sedated to the SICU. She is now extubated.    24 HOUR EVENTS: Patient continues to appear edematous and volume overloaded. A diuretic regimen of Metolazone 5 mg qd and Lasix 40 mg BID was started, to help alleviate full body edema and pulmonary congestion. The patient received a CT head yesterday to evaluate for altered mental status, which is still awaiting an official read. Her ammonia level was also checked yesterday and found to be normal. In lieu of low arterial po2 yesterday afternoon and evening, her tube feeds were held and she was reinstated on BIPAP at night. Her pO2 subsequently elevated. No acute pain events overnight.    SUBJECTIVE/ROS:  [ ] A ten-point review of systems was otherwise negative except as noted.  [x ] Due to altered mental status/intubation, subjective information were not able to be obtained from the patient. History was obtained, to the extent possible, from review of the chart and collateral sources of information.      NEURO  Exam: Awake, responsive, A&O x1   Meds:   [x] Adequacy of sedation and pain control has been assessed and adjusted      RESPIRATORY  RR: 25 (08-13-17 @ 05:00) (15 - 35)  SpO2: 97% (08-13-17 @ 05:00) (90% - 100%)  Wt(kg): --  Exam: unlabored, clear to auscultation bilaterally  Mechanical Ventilation:   ABG - ( 13 Aug 2017 02:40 )  pH: 7.46  /  pCO2: 34    /  pO2: 110   / HCO3: 24    / Base Excess: .8    /  SaO2: 99      Lactate: x                [ ] Extubation Readiness Assessed  Meds:       CARDIOVASCULAR  HR: 108 (08-13-17 @ 05:00) (73 - 117)  BP: 111/65 (08-13-17 @ 05:00) (103/67 - 166/74)  BP(mean): 81 (08-13-17 @ 05:00) (76 - 111)  ABP: --  ABP(mean): --  Wt(kg): --  CVP(cm H2O): --      Exam: RRR, no murmurs, no rubs, no gallops  Cardiac Rhythm: normal sinus  Perfusion     [ x]Adequate   [ ]Inadequate  Mentation   [ x]Normal       [ ]Reduced  Extremities  [x ]Warm         [ ]Cool  Volume Status [ ]Hypervolemic [x ]Euvolemic [ ]Hypovolemic  Meds: metoprolol 50 milliGRAM(s) Oral every 12 hours  metolazone 5 milliGRAM(s) Oral daily  furosemide   Injectable 40 milliGRAM(s) IV Push two times a day        GI/NUTRITION  Exam: soft, nontender, nondistended   Diet: NPO with tube feeds  Meds: pantoprazole   Suspension 40 milliGRAM(s) Oral daily      GENITOURINARY  I&O's Detail    08-11 @ 07:01  -  08-12 @ 07:00  --------------------------------------------------------  IN:    dextrose 5% + sodium chloride 0.45% with potassium chloride 20 mEq/L: 1575 mL    Enteral Tube Flush: 30 mL    Glucerna: 80 mL    IV PiggyBack: 100 mL  Total IN: 1785 mL    OUT:    Indwelling Catheter - Urethral: 1325 mL  Total OUT: 1325 mL    Total NET: 460 mL      08-12 @ 07:01  -  08-13 @ 05:19  --------------------------------------------------------  IN:    dextrose 5% + sodium chloride 0.45% with potassium chloride 20 mEq/L: 250 mL    Enteral Tube Flush: 30 mL    Glucerna: 80 mL  Total IN: 360 mL    OUT:    Indwelling Catheter - Urethral: 3030 mL  Total OUT: 3030 mL    Total NET: -2670 mL          08-13    140  |  101  |  54<H>  ----------------------------<  117<H>  3.5   |  23  |  1.61<H>    Ca    8.6      13 Aug 2017 02:46  Phos  4.6     08-13  Mg     2.0     08-13    TPro  5.8<L>  /  Alb  2.4<L>  /  TBili  2.2<H>  /  DBili  1.4<H>  /  AST  21  /  ALT  24  /  AlkPhos  80  08-13    [ x] Reyez catheter, indication: N/A  Meds: potassium chloride  10 mEq/100 mL IVPB 10 milliEquivalent(s) IV Intermittent every 1 hour        HEMATOLOGIC  Meds: aspirin  chewable 81 milliGRAM(s) Oral daily  enoxaparin Injectable 30 milliGRAM(s) SubCutaneous daily    [x] VTE Prophylaxis                        10.4   18.0  )-----------( 174      ( 13 Aug 2017 02:45 )             31.4     PT/INR - ( 13 Aug 2017 03:35 )   PT: 18.2 sec;   INR: 1.65 ratio         PTT - ( 13 Aug 2017 03:35 )  PTT:34.5 sec  Transfusion     [ ] PRBC   [ ] Platelets   [ ] FFP   [ ] Cryoprecipitate      INFECTIOUS DISEASES  T(C): 36.8 (08-13-17 @ 03:00), Max: 36.8 (08-12-17 @ 07:00)  Wt(kg): --  WBC Count: 18.0 K/uL (08-13 @ 02:45)    Recent Cultures:    Meds:       ENDOCRINE  Capillary Blood Glucose  100 (13 Aug 2017 00:00)  128 (12 Aug 2017 17:00)  142 (12 Aug 2017 11:00)  149 (12 Aug 2017 06:00)    Meds: insulin lispro (HumaLOG) corrective regimen sliding scale   SubCutaneous every 6 hours        ACCESS DEVICES:  [ x] Peripheral IV  [ ] Central Venous Line	[ ] R	[ ] L	[ ] IJ	[ ] Fem	[ ] SC	Placed:   [ ] Arterial Line		[ ] R	[ ] L	[ ] Fem	[ ] Rad	[ ] Ax	Placed:   [ ] PICC:					[ ] Mediport  [x ] Urinary Catheter, Date Placed:   [ x] Necessity of urinary, arterial, and venous catheters discussed    OTHER MEDICATIONS:      CODE STATUS: Full code    IMAGING: HISTORY   84y Female with h/o CHF, CAD s/p CABG, A.fib on Xarelto, PPM, MVR s/p clipping, HTN, HLD, DM who presented to ED after fall from standing x 2 in past 24hrs.  Pt denies LOC, admits to SOB and abdominal pain.  After +FAST in ED, pt underwent CTA which showed a 10.2cm complex liver cyst with high attenuation, concerning for active bleed. Pt taken to IR and underwent embolization of 4 vessels leading to cyst.  While in ED pt was tachycardic to 120s and received 1L bolus, 2 units PRBC, 2 FFP, 1500 K-centra and 10mg Vit K.  Emergent L femoral Introducer and A-line were placed.  In IR patient was intubated, briefly on Vitaly and received 4L crystalloid, 2 units PRBC, 6 FFP, 1 pack plts, and additional 1000 K-centra.  She underwent embolization of a large hemorrhagic liver mass. She was transferred intubated and sedated to the SICU. She is now extubated.    24 HOUR EVENTS: Patient continues to appear edematous and volume overloaded. A diuretic regimen of Metolazone 5 mg qd and Lasix 40 mg BID was started, to help alleviate full body edema and pulmonary congestion. The patient received a CT head yesterday to evaluate for altered mental status, which is still awaiting an official read. Her ammonia level was also checked yesterday and found to be normal. In lieu of low arterial po2 yesterday afternoon and evening, her tube feeds were held and she was reinstated on BIPAP at night. Her pO2 subsequently elevated. No acute pain events overnight.    SUBJECTIVE/ROS:  [ ] A ten-point review of systems was otherwise negative except as noted.  [x ] Due to altered mental status/intubation, subjective information were not able to be obtained from the patient. History was obtained, to the extent possible, from review of the chart and collateral sources of information.      NEURO  Exam: Awake, responsive, A&O x1   Meds:   [x] Adequacy of sedation and pain control has been assessed and adjusted      RESPIRATORY  RR: 25 (08-13-17 @ 05:00) (15 - 35)  SpO2: 97% (08-13-17 @ 05:00) (90% - 100%)  Wt(kg): --  Exam: unlabored, clear to auscultation bilaterally  Mechanical Ventilation:   ABG - ( 13 Aug 2017 02:40 )  pH: 7.46  /  pCO2: 34    /  pO2: 110   / HCO3: 24    / Base Excess: .8    /  SaO2: 99      Lactate: x                [ ] Extubation Readiness Assessed  Meds:       CARDIOVASCULAR  HR: 108 (08-13-17 @ 05:00) (73 - 117)  BP: 111/65 (08-13-17 @ 05:00) (103/67 - 166/74)  BP(mean): 81 (08-13-17 @ 05:00) (76 - 111)  ABP: --  ABP(mean): --  Wt(kg): --  CVP(cm H2O): --      Exam: RRR, no murmurs, no rubs, no gallops  Cardiac Rhythm: normal sinus  Perfusion     [ x]Adequate   [ ]Inadequate  Mentation   [ x]Normal       [ ]Reduced  Extremities  [x ]Warm         [ ]Cool  Volume Status [ ]Hypervolemic [x ]Euvolemic [ ]Hypovolemic  Meds: metoprolol 50 milliGRAM(s) Oral every 12 hours  metolazone 5 milliGRAM(s) Oral daily  furosemide   Injectable 40 milliGRAM(s) IV Push two times a day        GI/NUTRITION  Exam: firm, moderately distended, nontender   Diet: NPO with tube feeds  Meds: pantoprazole   Suspension 40 milliGRAM(s) Oral daily      GENITOURINARY  I&O's Detail    08-11 @ 07:01  -  08-12 @ 07:00  --------------------------------------------------------  IN:    dextrose 5% + sodium chloride 0.45% with potassium chloride 20 mEq/L: 1575 mL    Enteral Tube Flush: 30 mL    Glucerna: 80 mL    IV PiggyBack: 100 mL  Total IN: 1785 mL    OUT:    Indwelling Catheter - Urethral: 1325 mL  Total OUT: 1325 mL    Total NET: 460 mL      08-12 @ 07:01  -  08-13 @ 05:19  --------------------------------------------------------  IN:    dextrose 5% + sodium chloride 0.45% with potassium chloride 20 mEq/L: 250 mL    Enteral Tube Flush: 30 mL    Glucerna: 80 mL  Total IN: 360 mL    OUT:    Indwelling Catheter - Urethral: 3030 mL  Total OUT: 3030 mL    Total NET: -2670 mL          08-13    140  |  101  |  54<H>  ----------------------------<  117<H>  3.5   |  23  |  1.61<H>    Ca    8.6      13 Aug 2017 02:46  Phos  4.6     08-13  Mg     2.0     08-13    TPro  5.8<L>  /  Alb  2.4<L>  /  TBili  2.2<H>  /  DBili  1.4<H>  /  AST  21  /  ALT  24  /  AlkPhos  80  08-13    [ x] Reyez catheter, indication: N/A  Meds: potassium chloride  10 mEq/100 mL IVPB 10 milliEquivalent(s) IV Intermittent every 1 hour        HEMATOLOGIC  Meds: aspirin  chewable 81 milliGRAM(s) Oral daily  enoxaparin Injectable 30 milliGRAM(s) SubCutaneous daily    [x] VTE Prophylaxis                        10.4   18.0  )-----------( 174      ( 13 Aug 2017 02:45 )             31.4     PT/INR - ( 13 Aug 2017 03:35 )   PT: 18.2 sec;   INR: 1.65 ratio         PTT - ( 13 Aug 2017 03:35 )  PTT:34.5 sec  Transfusion     [ ] PRBC   [ ] Platelets   [ ] FFP   [ ] Cryoprecipitate      INFECTIOUS DISEASES  T(C): 36.8 (08-13-17 @ 03:00), Max: 36.8 (08-12-17 @ 07:00)  Wt(kg): --  WBC Count: 18.0 K/uL (08-13 @ 02:45)    Recent Cultures:    Meds:       ENDOCRINE  Capillary Blood Glucose  100 (13 Aug 2017 00:00)  128 (12 Aug 2017 17:00)  142 (12 Aug 2017 11:00)  149 (12 Aug 2017 06:00)    Meds: insulin lispro (HumaLOG) corrective regimen sliding scale   SubCutaneous every 6 hours        ACCESS DEVICES:  [ x] Peripheral IV  [ ] Central Venous Line	[ ] R	[ ] L	[ ] IJ	[ ] Fem	[ ] SC	Placed:   [ ] Arterial Line		[ ] R	[ ] L	[ ] Fem	[ ] Rad	[ ] Ax	Placed:   [ ] PICC:					[ ] Mediport  [x ] Urinary Catheter, Date Placed:   [ x] Necessity of urinary, arterial, and venous catheters discussed    OTHER MEDICATIONS:      CODE STATUS: Full code    IMAGING: HISTORY   84y Female with h/o CHF, CAD s/p CABG, A.fib on Xarelto, PPM, MVR s/p clipping, HTN, HLD, DM who presented to ED after fall from standing x 2 in past 24hrs.  Pt denies LOC, admits to SOB and abdominal pain.  After +FAST in ED, pt underwent CTA which showed a 10.2cm complex liver cyst with high attenuation, concerning for active bleed. Pt taken to IR and underwent embolization of 4 vessels leading to cyst.  While in ED pt was tachycardic to 120s and received 1L bolus, 2 units PRBC, 2 FFP, 1500 K-centra and 10mg Vit K.  Emergent L femoral Introducer and A-line were placed.  In IR patient was intubated, briefly on Vitaly and received 4L crystalloid, 2 units PRBC, 6 FFP, 1 pack plts, and additional 1000 K-centra.  She underwent embolization of a large hemorrhagic liver mass. She was transferred intubated and sedated to the SICU. She is now extubated.    24 HOUR EVENTS: Patient continues to appear edematous and volume overloaded. A diuretic regimen of Metolazone 5 mg qd and Lasix 40 mg BID was started, to help alleviate full body edema and pulmonary congestion. The patient received a CT head yesterday to evaluate for altered mental status, which is still awaiting an official read. Her ammonia level was also checked yesterday and found to be normal. In lieu of low arterial po2 yesterday afternoon and evening, her tube feeds were held and she was reinstated on BIPAP at night. Her pO2 subsequently elevated. This AM her belly seemed firm and moderately distended consistent with former exams but also demonstrated more tenderness than before, so abdominal xrays were ordered, and she was written for Miralax.     SUBJECTIVE/ROS:  [ ] A ten-point review of systems was otherwise negative except as noted.  [x ] Due to altered mental status/intubation, subjective information were not able to be obtained from the patient. History was obtained, to the extent possible, from review of the chart and collateral sources of information.      NEURO  Exam: Awake, responsive, A&O x1   Meds:   [x] Adequacy of sedation and pain control has been assessed and adjusted      RESPIRATORY  RR: 25 (08-13-17 @ 05:00) (15 - 35)  SpO2: 97% (08-13-17 @ 05:00) (90% - 100%)  Wt(kg): --  Exam: unlabored, clear to auscultation bilaterally  Mechanical Ventilation:   ABG - ( 13 Aug 2017 02:40 )  pH: 7.46  /  pCO2: 34    /  pO2: 110   / HCO3: 24    / Base Excess: .8    /  SaO2: 99      Lactate: x                [ ] Extubation Readiness Assessed  Meds:       CARDIOVASCULAR  HR: 108 (08-13-17 @ 05:00) (73 - 117)  BP: 111/65 (08-13-17 @ 05:00) (103/67 - 166/74)  BP(mean): 81 (08-13-17 @ 05:00) (76 - 111)  ABP: --  ABP(mean): --  Wt(kg): --  CVP(cm H2O): --      Exam: RRR, no murmurs, no rubs, no gallops  Cardiac Rhythm: normal sinus  Perfusion     [ x]Adequate   [ ]Inadequate  Mentation   [ x]Normal       [ ]Reduced  Extremities  [x ]Warm         [ ]Cool  Volume Status [ ]Hypervolemic [x ]Euvolemic [ ]Hypovolemic  Meds: metoprolol 50 milliGRAM(s) Oral every 12 hours  metolazone 5 milliGRAM(s) Oral daily  furosemide   Injectable 40 milliGRAM(s) IV Push two times a day        GI/NUTRITION  Exam: firm, moderately distended, moderately tender   Diet: NPO with tube feeds  Meds: pantoprazole   Suspension 40 milliGRAM(s) Oral daily      GENITOURINARY  I&O's Detail    08-11 @ 07:01  -  08-12 @ 07:00  --------------------------------------------------------  IN:    dextrose 5% + sodium chloride 0.45% with potassium chloride 20 mEq/L: 1575 mL    Enteral Tube Flush: 30 mL    Glucerna: 80 mL    IV PiggyBack: 100 mL  Total IN: 1785 mL    OUT:    Indwelling Catheter - Urethral: 1325 mL  Total OUT: 1325 mL    Total NET: 460 mL      08-12 @ 07:01  -  08-13 @ 05:19  --------------------------------------------------------  IN:    dextrose 5% + sodium chloride 0.45% with potassium chloride 20 mEq/L: 250 mL    Enteral Tube Flush: 30 mL    Glucerna: 80 mL  Total IN: 360 mL    OUT:    Indwelling Catheter - Urethral: 3030 mL  Total OUT: 3030 mL    Total NET: -2670 mL          08-13    140  |  101  |  54<H>  ----------------------------<  117<H>  3.5   |  23  |  1.61<H>    Ca    8.6      13 Aug 2017 02:46  Phos  4.6     08-13  Mg     2.0     08-13    TPro  5.8<L>  /  Alb  2.4<L>  /  TBili  2.2<H>  /  DBili  1.4<H>  /  AST  21  /  ALT  24  /  AlkPhos  80  08-13    [ x] Reyez catheter, indication: N/A  Meds: potassium chloride  10 mEq/100 mL IVPB 10 milliEquivalent(s) IV Intermittent every 1 hour        HEMATOLOGIC  Meds: aspirin  chewable 81 milliGRAM(s) Oral daily  enoxaparin Injectable 30 milliGRAM(s) SubCutaneous daily    [x] VTE Prophylaxis                        10.4   18.0  )-----------( 174      ( 13 Aug 2017 02:45 )             31.4     PT/INR - ( 13 Aug 2017 03:35 )   PT: 18.2 sec;   INR: 1.65 ratio         PTT - ( 13 Aug 2017 03:35 )  PTT:34.5 sec  Transfusion     [ ] PRBC   [ ] Platelets   [ ] FFP   [ ] Cryoprecipitate      INFECTIOUS DISEASES  T(C): 36.8 (08-13-17 @ 03:00), Max: 36.8 (08-12-17 @ 07:00)  Wt(kg): --  WBC Count: 18.0 K/uL (08-13 @ 02:45)    Recent Cultures:    Meds:       ENDOCRINE  Capillary Blood Glucose  100 (13 Aug 2017 00:00)  128 (12 Aug 2017 17:00)  142 (12 Aug 2017 11:00)  149 (12 Aug 2017 06:00)    Meds: insulin lispro (HumaLOG) corrective regimen sliding scale   SubCutaneous every 6 hours        ACCESS DEVICES:  [ x] Peripheral IV  [ ] Central Venous Line	[ ] R	[ ] L	[ ] IJ	[ ] Fem	[ ] SC	Placed:   [ ] Arterial Line		[ ] R	[ ] L	[ ] Fem	[ ] Rad	[ ] Ax	Placed:   [ ] PICC:					[ ] Mediport  [x ] Urinary Catheter, Date Placed:   [ x] Necessity of urinary, arterial, and venous catheters discussed    OTHER MEDICATIONS:      CODE STATUS: Full code    IMAGING: HISTORY   84y Female with h/o CHF, CAD s/p CABG, A.fib on Xarelto, PPM, MVR s/p clipping, HTN, HLD, DM who presented to ED after fall from standing x 2 in past 24hrs.  Pt denies LOC, admits to SOB and abdominal pain.  After +FAST in ED, pt underwent CTA which showed a 10.2cm complex liver cyst with high attenuation, concerning for active bleed. Pt taken to IR and underwent embolization of 4 vessels leading to cyst.  While in ED pt was tachycardic to 120s and received 1L bolus, 2 units PRBC, 2 FFP, 1500 K-centra and 10mg Vit K.  Emergent L femoral Introducer and A-line were placed.  In IR patient was intubated, briefly on Vitaly and received 4L crystalloid, 2 units PRBC, 6 FFP, 1 pack plts, and additional 1000 K-centra.  She underwent embolization of a large hemorrhagic liver mass. She was transferred intubated and sedated to the SICU. She is now extubated.    24 HOUR EVENTS: Patient continues to appear edematous and volume overloaded. A diuretic regimen of Metolazone 5 mg qd and Lasix 40 mg BID was started, to help alleviate full body edema and pulmonary congestion. The patient received a CT head yesterday to evaluate for altered mental status, which is still awaiting an official read. Her ammonia level was also checked yesterday and found to be normal. In lieu of low arterial po2 yesterday afternoon and evening, her tube feeds were held and she was reinstated on BIPAP at night. Her pO2 subsequently elevated. This AM her belly seemed firm and moderately distended consistent with former exams but also demonstrated more tenderness than before, so abdominal xrays were ordered, and she was written for Miralax.     SUBJECTIVE/ROS:  [ ] A ten-point review of systems was otherwise negative except as noted.  [x ] Due to altered mental status/intubation, subjective information were not able to be obtained from the patient. History was obtained, to the extent possible, from review of the chart and collateral sources of information.      NEURO  Exam: Awake, responsive, A&O x1   Meds:   [x] Adequacy of sedation and pain control has been assessed and adjusted      RESPIRATORY  RR: 25 (08-13-17 @ 05:00) (15 - 35)  SpO2: 97% (08-13-17 @ 05:00) (90% - 100%)  Wt(kg): --  Exam: unlabored, clear to auscultation bilaterally  Mechanical Ventilation:   ABG - ( 13 Aug 2017 02:40 )  pH: 7.46  /  pCO2: 34    /  pO2: 110   / HCO3: 24    / Base Excess: .8    /  SaO2: 99      Lactate: x                [ ] Extubation Readiness Assessed  Meds:       CARDIOVASCULAR  HR: 108 (08-13-17 @ 05:00) (73 - 117)  BP: 111/65 (08-13-17 @ 05:00) (103/67 - 166/74)  BP(mean): 81 (08-13-17 @ 05:00) (76 - 111)  ABP: --  ABP(mean): --  Wt(kg): --  CVP(cm H2O): --      Exam: RRR, no murmurs, no rubs, no gallops  Cardiac Rhythm: normal sinus  Perfusion     [ x]Adequate   [ ]Inadequate  Mentation   [ x]Normal       [ ]Reduced  Extremities  [x ]Warm         [ ]Cool  Volume Status [ ]Hypervolemic [x ]Euvolemic [ ]Hypovolemic  Meds: metoprolol 50 milliGRAM(s) Oral every 12 hours  metolazone 5 milliGRAM(s) Oral daily  furosemide   Injectable 40 milliGRAM(s) IV Push two times a day        GI/NUTRITION  Exam: firm, moderately distended, moderately tender   Diet: NPO with tube feeds  Meds: pantoprazole   Suspension 40 milliGRAM(s) Oral daily      GENITOURINARY  I&O's Detail    I&O's Detail    12 Aug 2017 07:01  -  13 Aug 2017 07:00  --------------------------------------------------------  IN:    dextrose 5% + sodium chloride 0.45% with potassium chloride 20 mEq/L: 250 mL    Enteral Tube Flush: 30 mL    Glucerna: 100 mL    IV PiggyBack: 200 mL  Total IN: 580 mL    OUT:    Indwelling Catheter - Urethral: 3230 mL  Total OUT: 3230 mL    Total NET: -2650 mL      08-11 @ 07:01  -  08-12 @ 07:00      08-12 @ 07:01  -  08-13 @ 05:19  --------------------------------------------------------  IN:    dextrose 5% + sodium chloride 0.45% with potassium chloride 20 mEq/L: 250 mL    Enteral Tube Flush: 30 mL    Glucerna: 80 mL  Total IN: 360 mL    OUT:    Indwelling Catheter - Urethral: 3030 mL  Total OUT: 3030 mL    Total NET: -2670 mL          08-13    140  |  101  |  54<H>  ----------------------------<  117<H>  3.5   |  23  |  1.61<H>    Ca    8.6      13 Aug 2017 02:46  Phos  4.6     08-13  Mg     2.0     08-13    TPro  5.8<L>  /  Alb  2.4<L>  /  TBili  2.2<H>  /  DBili  1.4<H>  /  AST  21  /  ALT  24  /  AlkPhos  80  08-13    [ x] Reyez catheter, indication: N/A  Meds: potassium chloride  10 mEq/100 mL IVPB 10 milliEquivalent(s) IV Intermittent every 1 hour        HEMATOLOGIC  Meds: aspirin  chewable 81 milliGRAM(s) Oral daily  enoxaparin Injectable 30 milliGRAM(s) SubCutaneous daily    [x] VTE Prophylaxis                        10.4   18.0  )-----------( 174      ( 13 Aug 2017 02:45 )             31.4     PT/INR - ( 13 Aug 2017 03:35 )   PT: 18.2 sec;   INR: 1.65 ratio         PTT - ( 13 Aug 2017 03:35 )  PTT:34.5 sec  Transfusion     [ ] PRBC   [ ] Platelets   [ ] FFP   [ ] Cryoprecipitate      INFECTIOUS DISEASES  T(C): 36.8 (08-13-17 @ 03:00), Max: 36.8 (08-12-17 @ 07:00)  Wt(kg): --  WBC Count: 18.0 K/uL (08-13 @ 02:45)    Recent Cultures:    Meds:       ENDOCRINE  Capillary Blood Glucose  100 (13 Aug 2017 00:00)  128 (12 Aug 2017 17:00)  142 (12 Aug 2017 11:00)  149 (12 Aug 2017 06:00)    Meds: insulin lispro (HumaLOG) corrective regimen sliding scale   SubCutaneous every 6 hours        ACCESS DEVICES:  [ x] Peripheral IV  [ ] Central Venous Line	[ ] R	[ ] L	[ ] IJ	[ ] Fem	[ ] SC	Placed:   [ ] Arterial Line		[ ] R	[ ] L	[ ] Fem	[ ] Rad	[ ] Ax	Placed:   [ ] PICC:					[ ] Mediport  [x ] Urinary Catheter, Date Placed:   [ x] Necessity of urinary, arterial, and venous catheters discussed    OTHER MEDICATIONS:      CODE STATUS: Full code    IMAGING: HISTORY   84y Female with h/o CHF, CAD s/p CABG, A.fib on Xarelto, PPM, MVR s/p clipping, HTN, HLD, DM who presented to ED after fall from standing x 2 in past 24hrs.  Pt denies LOC, admits to SOB and abdominal pain.  After +FAST in ED, pt underwent CTA which showed a 10.2cm complex liver cyst with high attenuation, concerning for active bleed. Pt taken to IR and underwent embolization of 4 vessels leading to cyst.  While in ED pt was tachycardic to 120s and received 1L bolus, 2 units PRBC, 2 FFP, 1500 K-centra and 10mg Vit K.  Emergent L femoral Introducer and A-line were placed.  In IR patient was intubated, briefly on Vitaly and received 4L crystalloid, 2 units PRBC, 6 FFP, 1 pack plts, and additional 1000 K-centra.  She underwent embolization of a large hemorrhagic liver mass. She was transferred intubated and sedated to the SICU. She is now extubated.    24 HOUR EVENTS: Patient continues to appear edematous and volume overloaded. A diuretic regimen of Metolazone 5 mg qd and Lasix 40 mg BID was started, to help alleviate full body edema and pulmonary congestion. The patient received a CT head yesterday to evaluate for altered mental status, which is still awaiting an official read. Her ammonia level was also checked yesterday and found to be normal. In lieu of low arterial po2 yesterday afternoon and evening, her tube feeds were held and she was reinstated on BIPAP at night. Her pO2 subsequently elevated. This AM her belly seemed firm and moderately distended consistent with former exams but also demonstrated more tenderness than before, so abdominal xrays were ordered, and she was written for Miralax.     SUBJECTIVE/ROS:  [ ] A ten-point review of systems was otherwise negative except as noted.  [x ] Due to altered mental status/intubation, subjective information were not able to be obtained from the patient. History was obtained, to the extent possible, from review of the chart and collateral sources of information.      NEURO  Exam: Awake, responsive, A&O x1   Meds:   [x] Adequacy of sedation and pain control has been assessed and adjusted      RESPIRATORY  RR: 25 (08-13-17 @ 05:00) (15 - 35)  SpO2: 97% (08-13-17 @ 05:00) (90% - 100%)  Wt(kg): --  Exam: unlabored, clear to auscultation bilaterally  Mechanical Ventilation:   ABG - ( 13 Aug 2017 02:40 )  pH: 7.46  /  pCO2: 34    /  pO2: 110   / HCO3: 24    / Base Excess: .8    /  SaO2: 99      Lactate: x                [ ] Extubation Readiness Assessed  Meds:       CARDIOVASCULAR  HR: 108 (08-13-17 @ 05:00) (73 - 117)  BP: 111/65 (08-13-17 @ 05:00) (103/67 - 166/74)  BP(mean): 81 (08-13-17 @ 05:00) (76 - 111)  ABP: --  ABP(mean): --  Wt(kg): --  CVP(cm H2O): --      Exam: RRR, no murmurs, no rubs, no gallops  Cardiac Rhythm: normal sinus  Perfusion     [ x]Adequate   [ ]Inadequate  Mentation   [ x]Normal       [ ]Reduced  Extremities  [x ]Warm         [ ]Cool  Volume Status [ ]Hypervolemic [x ]Euvolemic [ ]Hypovolemic  Meds: metoprolol 50 milliGRAM(s) Oral every 12 hours  metolazone 5 milliGRAM(s) Oral daily  furosemide   Injectable 40 milliGRAM(s) IV Push two times a day    GI/NUTRITION  Exam: firm, moderately distended, moderately tender   Diet: NPO with tube feeds  Meds: pantoprazole   Suspension 40 milliGRAM(s) Oral daily      GENITOURINARY  I&O's Detail    I&O's Detail    12 Aug 2017 07:01  -  13 Aug 2017 07:00  --------------------------------------------------------  IN:    dextrose 5% + sodium chloride 0.45% with potassium chloride 20 mEq/L: 250 mL    Enteral Tube Flush: 30 mL    Glucerna: 100 mL    IV PiggyBack: 200 mL  Total IN: 580 mL    OUT:    Indwelling Catheter - Urethral: 3230 mL  Total OUT: 3230 mL    Total NET: -2650 mL      08-11 @ 07:01  -  08-12 @ 07:00      08-12 @ 07:01  -  08-13 @ 05:19  --------------------------------------------------------  IN:    dextrose 5% + sodium chloride 0.45% with potassium chloride 20 mEq/L: 250 mL    Enteral Tube Flush: 30 mL    Glucerna: 80 mL  Total IN: 360 mL    OUT:    Indwelling Catheter - Urethral: 3030 mL  Total OUT: 3030 mL    Total NET: -2670 mL          08-13    140  |  101  |  54<H>  ----------------------------<  117<H>  3.5   |  23  |  1.61<H>    Ca    8.6      13 Aug 2017 02:46  Phos  4.6     08-13  Mg     2.0     08-13    TPro  5.8<L>  /  Alb  2.4<L>  /  TBili  2.2<H>  /  DBili  1.4<H>  /  AST  21  /  ALT  24  /  AlkPhos  80  08-13    [ x] Reyez catheter, indication: N/A  Meds: potassium chloride  10 mEq/100 mL IVPB 10 milliEquivalent(s) IV Intermittent every 1 hour        HEMATOLOGIC  Meds: aspirin  chewable 81 milliGRAM(s) Oral daily  enoxaparin Injectable 30 milliGRAM(s) SubCutaneous daily    [x] VTE Prophylaxis                        10.4   18.0  )-----------( 174      ( 13 Aug 2017 02:45 )             31.4     PT/INR - ( 13 Aug 2017 03:35 )   PT: 18.2 sec;   INR: 1.65 ratio         PTT - ( 13 Aug 2017 03:35 )  PTT:34.5 sec  Transfusion     [ ] PRBC   [ ] Platelets   [ ] FFP   [ ] Cryoprecipitate      INFECTIOUS DISEASES  T(C): 36.8 (08-13-17 @ 03:00), Max: 36.8 (08-12-17 @ 07:00)  Wt(kg): --  WBC Count: 18.0 K/uL (08-13 @ 02:45)    Recent Cultures:    Meds:       ENDOCRINE  Capillary Blood Glucose  100 (13 Aug 2017 00:00)  128 (12 Aug 2017 17:00)  142 (12 Aug 2017 11:00)  149 (12 Aug 2017 06:00)    Meds: insulin lispro (HumaLOG) corrective regimen sliding scale   SubCutaneous every 6 hours        ACCESS DEVICES:  [ x] Peripheral IV  [ ] Central Venous Line	[ ] R	[ ] L	[ ] IJ	[ ] Fem	[ ] SC	Placed:   [ ] Arterial Line		[ ] R	[ ] L	[ ] Fem	[ ] Rad	[ ] Ax	Placed:   [ ] PICC:					[ ] Mediport  [x ] Urinary Catheter, Date Placed:   [ x] Necessity of urinary, arterial, and venous catheters discussed    OTHER MEDICATIONS:      CODE STATUS: Full code    IMAGING:

## 2017-08-13 NOTE — PROGRESS NOTE ADULT - ASSESSMENT
84 year old female s/p fall with bleeding 10.2 cm liver cyst s/p IR embolization    Neuro: post-procedure pain control  - IV acetaminophen prn    Resp: acute respiratory failure, now extubated  - BIPAP at night to ease work of breathing and help O2 saturation  - Monitor pulse oximeter.  - Monitor CXR, assess for pulmonary edema.  - Pulmonary toileting, incentive spirometry, and out of bed to chair to prevent atelectasis    CV: Afib with RVR on Xarelto, CHF, CAD s/p CABG, MVR s/p clipping, PPM, HTN, HLD  - Monitor vital signs.  - Metoprolol increased secondary to tachycardia  - Monitor volume status carefully    GI: h/o GERD, enteritis on CT  - NPO with tube feeds. Feeds held at night when patient receives BIPAP  - Protonix for stress ulcer prophylaxis     /Renal: CKD stage 3 with MYA  - Strict I's/O's  - Monitor volume status previously managed FeNA 0.3% and FeUrea 15% pre-renal but volume overloaded on exam.  - Lasix 40 mg IV BID, Metolazone 5 mg qd.  - Monitor electrolytes and replete as necessary    Heme: hemorrhagic shock s/p IR embolization liver cyst  - Lovenox for VTE prophylaxis, ASA 81 mg  - Monitor CBC and coags     ID: afebrile  - Monitor for clinical evidence of active infection    Endo: h/o DM  - Monitor fingersticks and ISS for glycemic control    Disposition: full code, will remain in SICU for monitoring    Vega Galvan PGY 1 84 year old female s/p fall with bleeding 10.2 cm liver cyst s/p IR embolization    Neuro: post-procedure pain control  - IV acetaminophen prn  - Got CT of head today, official read pending     Resp: acute respiratory failure, now extubated  - BIPAP at night to ease work of breathing and help O2 saturation  - Monitor pulse oximeter.  - Monitor CXR, assess for pulmonary edema.  - Pulmonary toileting, incentive spirometry, and out of bed to chair to prevent atelectasis    CV: Afib with RVR on Xarelto, CHF, CAD s/p CABG, MVR s/p clipping, PPM, HTN, HLD  - Monitor vital signs.  - Metoprolol increased secondary to tachycardia  - Monitor volume status carefully    GI: h/o GERD, enteritis on CT  - NPO with tube feeds. Feeds held at night when patient receives BIPAP  - Protonix for stress ulcer prophylaxis     /Renal: CKD stage 3 with MYA  - Strict I's/O's  - Monitor volume status previously managed FeNA 0.3% and FeUrea 15% pre-renal but volume overloaded on exam.  - Lasix 40 mg IV BID, Metolazone 5 mg qd.  - Ammonia level normal today (8/12)  - Monitor electrolytes and replete as necessary    Heme: hemorrhagic shock s/p IR embolization liver cyst  - Lovenox for VTE prophylaxis, ASA 81 mg  - Monitor CBC and coags     ID: afebrile  - Monitor for clinical evidence of active infection    Endo: h/o DM  - Monitor fingersticks and ISS for glycemic control    Disposition: full code, will remain in SICU for monitoring    Vega Galvan PGY 1

## 2017-08-13 NOTE — PROGRESS NOTE ADULT - ATTENDING COMMENTS
Dr. Burger (Attending Physician)  Depressed Mental Status able to follow, but unable to speak.  Ammonia normal yesterday.  CT scan from 8/8 with chronic infarcts, unchanged yesterday.  Will consult neurology today.  Spoke with son who says patient had no difficulties with speech prior.  Poor cough. Aggressive chest PT.  monitoring respiratory status on BiPAP at night. volume overloaded. continue diuresis with lasix/ for goal -500 to 1 liter for MYA on CKD.  C/w trickle tube feeds Will dc NGT and place minesh feeding tube.  INR remains elevated likely hepatic dyfunction from large liver cyst. Palliative Care consulted will have goals of care meeting when son arrives from out of town tomorrow.

## 2017-08-13 NOTE — PROGRESS NOTE ADULT - SUBJECTIVE AND OBJECTIVE BOX
Pt seen and examined. Continuing to have decreased mental status- unchanged. CT head doen overnight was unremarkable. No obvious metabolic source identified.               Objective:   Vitals:   Vital Signs Last 24 Hrs  T(C): 36.7 (13 Aug 2017 07:00), Max: 36.8 (12 Aug 2017 23:00)  T(F): 98.1 (13 Aug 2017 07:00), Max: 98.2 (12 Aug 2017 23:00)  HR: 100 (13 Aug 2017 09:00) (76 - 117)  BP: 118/59 (13 Aug 2017 09:00) (108/55 - 166/74)  BP(mean): 82 (13 Aug 2017 09:00) (72 - 111)  RR: 21 (13 Aug 2017 09:00) (15 - 35)  SpO2: 98% (13 Aug 2017 09:) (90% - 100%)  In/Outs:     @ 07:  -   @ 07:00  --------------------------------------------------------  IN:    dextrose 5% + sodium chloride 0.45% with potassium chloride 20 mEq/L: 250 mL    Enteral Tube Flush: 30 mL    Glucerna: 120 mL    IV PiggyBack: 200 mL  Total IN: 600 mL    OUT:    Indwelling Catheter - Urethral: 3305 mL  Total OUT: 3305 mL    Total NET: -2705 mL       @ 07:  -   @ 10:16  --------------------------------------------------------  IN:    Glucerna: 60 mL  Total IN: 60 mL    OUT:    Indwelling Catheter - Urethral: 425 mL  Total OUT: 425 mL    Total NET: -365 mL          Physical Exam  General: NAD   Abdomen: soft, NT, RUQ mass    MEDICATIONS  (STANDING):  insulin lispro (HumaLOG) corrective regimen sliding scale   SubCutaneous every 6 hours  aspirin  chewable 81 milliGRAM(s) Oral daily  metoprolol 50 milliGRAM(s) Oral every 12 hours  enoxaparin Injectable 30 milliGRAM(s) SubCutaneous daily  pantoprazole   Suspension 40 milliGRAM(s) Oral daily  metolazone 5 milliGRAM(s) Oral daily  furosemide   Injectable 40 milliGRAM(s) IV Push two times a day  acetaminophen    Suspension. 650 milliGRAM(s) Oral once    MEDICATIONS  (PRN):  polyethylene glycol 3350 17 Gram(s) Oral daily PRN Constipation      LABS:                        10.4   18.0  )-----------( 174      ( 13 Aug 2017 02:45 )             31.4     08-13    140  |  101  |  54<H>  ----------------------------<  117<H>  3.5   |  23  |  1.61<H>    Ca    8.6      13 Aug 2017 02:46  Phos  4.6     08  Mg     2.0         TPro  5.8<L>  /  Alb  2.4<L>  /  TBili  2.2<H>  /  DBili  1.4<H>  /  AST  21  /  ALT  24  /  AlkPhos  80  08-13    PT/INR - ( 13 Aug 2017 03:35 )   PT: 18.2 sec;   INR: 1.65 ratio         PTT - ( 13 Aug 2017 03:35 )  PTT:34.5 sec  Urinalysis Basic - ( 12 Aug 2017 13:33 )    Color: Yellow / Appearance: Clear / S.007 / pH: x  Gluc: x / Ketone: Negative  / Bili: Negative / Urobili: Negative   Blood: x / Protein: Negative / Nitrite: Negative   Leuk Esterase: Moderate / RBC: 0-2 /HPF / WBC 5-10 /HPF   Sq Epi: x / Non Sq Epi: x / Bacteria: Few /HPF      < from: CT Head No Cont (17 @ 16:29) >  No acute intracranial hemorrhage or mass effect.   Redemonstrated chronic infarcts as described.  When compared with prior CT brain 2017, no significant interval   change.      < end of copied text >      84 year old female s/p fall with bleeding 10.2 cm liver cyst s/p IR embolization  - avoid narcotics, agree w tylenol  -  respiratory failure- BIPAP PRN  - continue tube feeds, check residuals.  - Leukocytosis improving off abx- continue expectant management.  - Cont DVT PPx, daily h/h  - Will need to discuss goals of care with the family, given poor mental status.  - PT/ Rehab planning.

## 2017-08-14 DIAGNOSIS — R41.82 ALTERED MENTAL STATUS, UNSPECIFIED: ICD-10-CM

## 2017-08-14 DIAGNOSIS — I47.2 VENTRICULAR TACHYCARDIA: ICD-10-CM

## 2017-08-14 DIAGNOSIS — R41.0 DISORIENTATION, UNSPECIFIED: ICD-10-CM

## 2017-08-14 DIAGNOSIS — K66.1 HEMOPERITONEUM: ICD-10-CM

## 2017-08-14 DIAGNOSIS — N17.9 ACUTE KIDNEY FAILURE, UNSPECIFIED: ICD-10-CM

## 2017-08-14 DIAGNOSIS — Z51.5 ENCOUNTER FOR PALLIATIVE CARE: ICD-10-CM

## 2017-08-14 DIAGNOSIS — D49.0 NEOPLASM OF UNSPECIFIED BEHAVIOR OF DIGESTIVE SYSTEM: ICD-10-CM

## 2017-08-14 DIAGNOSIS — I48.91 UNSPECIFIED ATRIAL FIBRILLATION: ICD-10-CM

## 2017-08-14 LAB
AFP-TM SERPL-MCNC: 1.9 NG/ML — SIGNIFICANT CHANGE UP
ALBUMIN SERPL ELPH-MCNC: 2.5 G/DL — LOW (ref 3.3–5)
ALP SERPL-CCNC: 101 U/L — SIGNIFICANT CHANGE UP (ref 40–120)
ALT FLD-CCNC: 21 U/L RC — SIGNIFICANT CHANGE UP (ref 10–45)
ANION GAP SERPL CALC-SCNC: 13 MMOL/L — SIGNIFICANT CHANGE UP (ref 5–17)
ANION GAP SERPL CALC-SCNC: 18 MMOL/L — HIGH (ref 5–17)
APTT BLD: 31.1 SEC — SIGNIFICANT CHANGE UP (ref 27.5–37.4)
AST SERPL-CCNC: 21 U/L — SIGNIFICANT CHANGE UP (ref 10–40)
BILIRUB SERPL-MCNC: 2.4 MG/DL — HIGH (ref 0.2–1.2)
BUN SERPL-MCNC: 59 MG/DL — HIGH (ref 7–23)
BUN SERPL-MCNC: 60 MG/DL — HIGH (ref 7–23)
CA-I BLD-SCNC: 1.15 MMOL/L — SIGNIFICANT CHANGE UP (ref 1.12–1.3)
CALCIUM SERPL-MCNC: 8.8 MG/DL — SIGNIFICANT CHANGE UP (ref 8.4–10.5)
CALCIUM SERPL-MCNC: 9 MG/DL — SIGNIFICANT CHANGE UP (ref 8.4–10.5)
CHLORIDE SERPL-SCNC: 97 MMOL/L — SIGNIFICANT CHANGE UP (ref 96–108)
CHLORIDE SERPL-SCNC: 99 MMOL/L — SIGNIFICANT CHANGE UP (ref 96–108)
CK MB CFR SERPL CALC: 1.8 NG/ML — SIGNIFICANT CHANGE UP (ref 0–3.8)
CK SERPL-CCNC: 50 U/L — SIGNIFICANT CHANGE UP (ref 25–170)
CO2 SERPL-SCNC: 26 MMOL/L — SIGNIFICANT CHANGE UP (ref 22–31)
CO2 SERPL-SCNC: 29 MMOL/L — SIGNIFICANT CHANGE UP (ref 22–31)
CREAT SERPL-MCNC: 1.53 MG/DL — HIGH (ref 0.5–1.3)
CREAT SERPL-MCNC: 1.57 MG/DL — HIGH (ref 0.5–1.3)
GLUCOSE SERPL-MCNC: 129 MG/DL — HIGH (ref 70–99)
GLUCOSE SERPL-MCNC: 174 MG/DL — HIGH (ref 70–99)
HAV IGM SER-ACNC: SIGNIFICANT CHANGE UP
HBV CORE IGM SER-ACNC: SIGNIFICANT CHANGE UP
HBV SURFACE AG SER-ACNC: SIGNIFICANT CHANGE UP
HCT VFR BLD CALC: 31.4 % — LOW (ref 34.5–45)
HCV AB S/CO SERPL IA: 0.16 S/CO — SIGNIFICANT CHANGE UP
HCV AB SERPL-IMP: SIGNIFICANT CHANGE UP
HGB BLD-MCNC: 10.2 G/DL — LOW (ref 11.5–15.5)
INR BLD: 1.5 RATIO — HIGH (ref 0.88–1.16)
MAGNESIUM SERPL-MCNC: 1.9 MG/DL — SIGNIFICANT CHANGE UP (ref 1.6–2.6)
MAGNESIUM SERPL-MCNC: 2 MG/DL — SIGNIFICANT CHANGE UP (ref 1.6–2.6)
MCHC RBC-ENTMCNC: 28.9 PG — SIGNIFICANT CHANGE UP (ref 27–34)
MCHC RBC-ENTMCNC: 32.5 GM/DL — SIGNIFICANT CHANGE UP (ref 32–36)
MCV RBC AUTO: 88.9 FL — SIGNIFICANT CHANGE UP (ref 80–100)
PHOSPHATE SERPL-MCNC: 4.4 MG/DL — SIGNIFICANT CHANGE UP (ref 2.5–4.5)
PHOSPHATE SERPL-MCNC: 4.6 MG/DL — HIGH (ref 2.5–4.5)
PLATELET # BLD AUTO: 199 K/UL — SIGNIFICANT CHANGE UP (ref 150–400)
POTASSIUM SERPL-MCNC: 2.8 MMOL/L — CRITICAL LOW (ref 3.5–5.3)
POTASSIUM SERPL-MCNC: 3.3 MMOL/L — LOW (ref 3.5–5.3)
POTASSIUM SERPL-SCNC: 2.8 MMOL/L — CRITICAL LOW (ref 3.5–5.3)
POTASSIUM SERPL-SCNC: 3.3 MMOL/L — LOW (ref 3.5–5.3)
PROT SERPL-MCNC: 6.1 G/DL — SIGNIFICANT CHANGE UP (ref 6–8.3)
PROTHROM AB SERPL-ACNC: 16.5 SEC — HIGH (ref 9.8–12.7)
RBC # BLD: 3.54 M/UL — LOW (ref 3.8–5.2)
RBC # FLD: 15 % — HIGH (ref 10.3–14.5)
SODIUM SERPL-SCNC: 141 MMOL/L — SIGNIFICANT CHANGE UP (ref 135–145)
SODIUM SERPL-SCNC: 141 MMOL/L — SIGNIFICANT CHANGE UP (ref 135–145)
T3 SERPL-MCNC: 60 NG/DL — LOW (ref 80–200)
T4 AB SER-ACNC: 4.2 UG/DL — LOW (ref 4.6–12)
TROPONIN T SERPL-MCNC: 0.03 NG/ML — SIGNIFICANT CHANGE UP (ref 0–0.06)
TSH SERPL-MCNC: 6.14 UIU/ML — HIGH (ref 0.27–4.2)
WBC # BLD: 15.6 K/UL — HIGH (ref 3.8–10.5)
WBC # FLD AUTO: 15.6 K/UL — HIGH (ref 3.8–10.5)

## 2017-08-14 PROCEDURE — 99223 1ST HOSP IP/OBS HIGH 75: CPT | Mod: GC

## 2017-08-14 PROCEDURE — 93971 EXTREMITY STUDY: CPT | Mod: 26

## 2017-08-14 PROCEDURE — 71010: CPT | Mod: 26

## 2017-08-14 PROCEDURE — 99024 POSTOP FOLLOW-UP VISIT: CPT

## 2017-08-14 PROCEDURE — 99291 CRITICAL CARE FIRST HOUR: CPT

## 2017-08-14 PROCEDURE — 93010 ELECTROCARDIOGRAM REPORT: CPT

## 2017-08-14 RX ORDER — POTASSIUM CHLORIDE 20 MEQ
20 PACKET (EA) ORAL
Qty: 0 | Refills: 0 | Status: COMPLETED | OUTPATIENT
Start: 2017-08-14 | End: 2017-08-14

## 2017-08-14 RX ORDER — FUROSEMIDE 40 MG
20 TABLET ORAL
Qty: 0 | Refills: 0 | Status: DISCONTINUED | OUTPATIENT
Start: 2017-08-14 | End: 2017-08-14

## 2017-08-14 RX ORDER — SPIRONOLACTONE 25 MG/1
50 TABLET, FILM COATED ORAL EVERY 24 HOURS
Qty: 0 | Refills: 0 | Status: DISCONTINUED | OUTPATIENT
Start: 2017-08-15 | End: 2017-08-21

## 2017-08-14 RX ORDER — AMIODARONE HYDROCHLORIDE 400 MG/1
0.5 TABLET ORAL
Qty: 900 | Refills: 0 | Status: DISCONTINUED | OUTPATIENT
Start: 2017-08-14 | End: 2017-08-14

## 2017-08-14 RX ORDER — ENOXAPARIN SODIUM 100 MG/ML
40 INJECTION SUBCUTANEOUS EVERY 24 HOURS
Qty: 0 | Refills: 0 | Status: DISCONTINUED | OUTPATIENT
Start: 2017-08-14 | End: 2017-08-21

## 2017-08-14 RX ORDER — POTASSIUM CHLORIDE 20 MEQ
20 PACKET (EA) ORAL EVERY 12 HOURS
Qty: 0 | Refills: 0 | Status: DISCONTINUED | OUTPATIENT
Start: 2017-08-14 | End: 2017-08-21

## 2017-08-14 RX ORDER — AMIODARONE HYDROCHLORIDE 400 MG/1
150 TABLET ORAL ONCE
Qty: 0 | Refills: 0 | Status: COMPLETED | OUTPATIENT
Start: 2017-08-14 | End: 2017-08-14

## 2017-08-14 RX ORDER — POTASSIUM CHLORIDE 20 MEQ
10 PACKET (EA) ORAL
Qty: 0 | Refills: 0 | Status: COMPLETED | OUTPATIENT
Start: 2017-08-14 | End: 2017-08-14

## 2017-08-14 RX ORDER — METOPROLOL TARTRATE 50 MG
50 TABLET ORAL EVERY 8 HOURS
Qty: 0 | Refills: 0 | Status: DISCONTINUED | OUTPATIENT
Start: 2017-08-14 | End: 2017-08-16

## 2017-08-14 RX ORDER — FUROSEMIDE 40 MG
20 TABLET ORAL EVERY 12 HOURS
Qty: 0 | Refills: 0 | Status: DISCONTINUED | OUTPATIENT
Start: 2017-08-14 | End: 2017-08-16

## 2017-08-14 RX ORDER — SPIRONOLACTONE 25 MG/1
50 TABLET, FILM COATED ORAL ONCE
Qty: 0 | Refills: 0 | Status: COMPLETED | OUTPATIENT
Start: 2017-08-14 | End: 2017-08-14

## 2017-08-14 RX ORDER — AMIODARONE HYDROCHLORIDE 400 MG/1
1 TABLET ORAL
Qty: 900 | Refills: 0 | Status: DISCONTINUED | OUTPATIENT
Start: 2017-08-14 | End: 2017-08-14

## 2017-08-14 RX ADMIN — Medication 100 MILLIEQUIVALENT(S): at 16:00

## 2017-08-14 RX ADMIN — Medication 40 MILLIGRAM(S): at 05:06

## 2017-08-14 RX ADMIN — Medication 20 MILLIEQUIVALENT(S): at 14:41

## 2017-08-14 RX ADMIN — ENOXAPARIN SODIUM 40 MILLIGRAM(S): 100 INJECTION SUBCUTANEOUS at 18:08

## 2017-08-14 RX ADMIN — Medication 50 MILLIGRAM(S): at 17:58

## 2017-08-14 RX ADMIN — Medication 81 MILLIGRAM(S): at 12:12

## 2017-08-14 RX ADMIN — Medication 20 MILLIEQUIVALENT(S): at 06:02

## 2017-08-14 RX ADMIN — Medication 20 MILLIEQUIVALENT(S): at 12:12

## 2017-08-14 RX ADMIN — Medication 2: at 13:12

## 2017-08-14 RX ADMIN — Medication 20 MILLIEQUIVALENT(S): at 17:59

## 2017-08-14 RX ADMIN — Medication 20 MILLIGRAM(S): at 18:00

## 2017-08-14 RX ADMIN — Medication 20 MILLIEQUIVALENT(S): at 16:03

## 2017-08-14 RX ADMIN — ENOXAPARIN SODIUM 30 MILLIGRAM(S): 100 INJECTION SUBCUTANEOUS at 12:12

## 2017-08-14 RX ADMIN — Medication 100 MILLIEQUIVALENT(S): at 16:55

## 2017-08-14 RX ADMIN — PANTOPRAZOLE SODIUM 40 MILLIGRAM(S): 20 TABLET, DELAYED RELEASE ORAL at 12:12

## 2017-08-14 RX ADMIN — Medication 100 MILLIEQUIVALENT(S): at 14:41

## 2017-08-14 RX ADMIN — SPIRONOLACTONE 50 MILLIGRAM(S): 25 TABLET, FILM COATED ORAL at 14:40

## 2017-08-14 RX ADMIN — AMIODARONE HYDROCHLORIDE 33.33 MG/MIN: 400 TABLET ORAL at 11:40

## 2017-08-14 RX ADMIN — Medication 20 MILLIEQUIVALENT(S): at 10:05

## 2017-08-14 RX ADMIN — Medication 20 MILLIEQUIVALENT(S): at 08:02

## 2017-08-14 RX ADMIN — AMIODARONE HYDROCHLORIDE 600 MILLIGRAM(S): 400 TABLET ORAL at 11:28

## 2017-08-14 RX ADMIN — Medication 50 MILLIGRAM(S): at 21:33

## 2017-08-14 RX ADMIN — Medication 50 MILLIGRAM(S): at 05:07

## 2017-08-14 NOTE — CONSULT NOTE ADULT - PROBLEM SELECTOR RECOMMENDATION 2
-rate controlled on metoprolol.   -Off A/C secondary to recent intraperitoneal bleed.
Seems to be improving.  At time of my exam, patient becoming more cogent.

## 2017-08-14 NOTE — PROGRESS NOTE ADULT - ASSESSMENT
84 year old female s/p fall with bleeding 10.2 cm liver cyst s/p IR embolization    Neuro: post-procedure pain control  - IV acetaminophen prn  - Got CT of head today, official read pending     Resp: acute respiratory failure, now extubated  - BIPAP at night to ease work of breathing and help O2 saturation  - Monitor pulse oximeter.  - Monitor CXR, assess for pulmonary edema.  - Pulmonary toileting, incentive spirometry, and out of bed to chair to prevent atelectasis    CV: Afib with RVR on Xarelto, CHF, CAD s/p CABG, MVR s/p clipping, PPM, HTN, HLD  - Monitor vital signs.  - Metoprolol increased secondary to tachycardia  - Monitor volume status carefully    GI: h/o GERD, enteritis on CT  - NPO with tube feeds. Feeds held at night when patient receives BIPAP  - Protonix for stress ulcer prophylaxis     /Renal: CKD stage 3 with MYA  - Strict I's/O's  - Monitor volume status previously managed FeNA 0.3% and FeUrea 15% pre-renal but volume overloaded on exam.  - Lasix 40 mg IV BID, Metolazone 5 mg qd.  - Ammonia level normal today (8/12)  - Monitor electrolytes and replete as necessary    Heme: hemorrhagic shock s/p IR embolization liver cyst  - Lovenox for VTE prophylaxis, ASA 81 mg  - Monitor CBC and coags     ID: afebrile  - Monitor for clinical evidence of active infection    Endo: h/o DM  - Monitor fingersticks and ISS for glycemic control    Disposition: full code, will remain in SICU for monitoring    Vega Galvan PGY 1 84 year old female s/p fall with bleeding 10.2 cm liver cyst s/p IR embolization complicated by altered mental status. CT Head was unchanged from prior exam.     Neuro: post-procedure pain control  - IV acetaminophen prn  - Got CT of head today, official read pending     Resp: acute respiratory failure, now extubated  - BIPAP at night to ease work of breathing and help O2 saturation  - Monitor pulse oximeter.  - Monitor CXR, assess for pulmonary edema.  - Pulmonary toileting, incentive spirometry, and out of bed to chair to prevent atelectasis    CV: Afib with RVR on Xarelto, CHF, CAD s/p CABG, MVR s/p clipping, PPM, HTN, HLD  - Monitor vital signs.  - Metoprolol increased secondary to tachycardia  - Monitor volume status carefully    GI: h/o GERD, enteritis on CT  - NPO with tube feeds. Feeds held at night when patient receives BIPAP  - Protonix for stress ulcer prophylaxis     /Renal: CKD stage 3 with MYA  - Strict I's/O's  - Monitor volume status previously managed FeNA 0.3% and FeUrea 15% pre-renal but volume overloaded on exam.  - Lasix 40 mg IV BID, Metolazone 5 mg qd.  - Ammonia level normal today (8/12)  - Monitor electrolytes and replete as necessary    Heme: hemorrhagic shock s/p IR embolization liver cyst  - Lovenox for VTE prophylaxis, ASA 81 mg  - Monitor CBC and coags     ID: afebrile  - Monitor for clinical evidence of active infection    Endo: h/o DM  - Monitor fingersticks and ISS for glycemic control    Disposition: full code, will remain in SICU for monitoring    Vega Galvan PGY 1

## 2017-08-14 NOTE — CONSULT NOTE ADULT - SUBJECTIVE AND OBJECTIVE BOX
Neurology Consult    Name  ELY WOOTEN    This is an 84 year old female with afib on Xarelto, DMII, HTN, HLD presenting with 2 falls found to have bleed in liver s/p embolization procedure who has had episodes of decompensation requiring blood transfusions, K-centra, and also intubation.  Neurology is consulted for altered mental status that per nursing, has been present since the 2nd fall just prior to admission. Even after patient stabilized and extubated, is still not at her baseline according to family.   However, prior notes indicate that patient was alert and oriented x3 while intubated and sedated and just after extubation was A&Ox3 despite problems with phonation.  Per nursing at bedside, patient has not been verbal and does not follow commands. No family currently at bedside.                                                          MEDICATIONS  (STANDING):  insulin lispro (HumaLOG) corrective regimen sliding scale   SubCutaneous every 6 hours  aspirin  chewable 81 milliGRAM(s) Oral daily  metoprolol 50 milliGRAM(s) Oral every 12 hours  enoxaparin Injectable 30 milliGRAM(s) SubCutaneous daily  pantoprazole   Suspension 40 milliGRAM(s) Oral daily  metolazone 5 milliGRAM(s) Oral daily  furosemide   Injectable 40 milliGRAM(s) IV Push two times a day  potassium chloride   Solution 20 milliEquivalent(s) Oral every 2 hours  potassium chloride   Solution 20 milliEquivalent(s) Oral every 12 hours    MEDICATIONS  (PRN):  polyethylene glycol 3350 17 Gram(s) Oral daily PRN Constipation      Allergies    No Known Allergies    Intolerances        Objective  Vital Signs Last 24 Hrs  T(C): 37 (14 Aug 2017 07:00), Max: 37.1 (13 Aug 2017 20:00)  T(F): 98.6 (14 Aug 2017 07:00), Max: 98.7 (13 Aug 2017 20:00)  HR: 71 (14 Aug 2017 08:00) (71 - 130)  BP: 137/63 (14 Aug 2017 08:00) (119/56 - 176/93)  BP(mean): 90 (14 Aug 2017 08:00) (75 - 125)  RR: 23 (14 Aug 2017 08:00) (17 - 26)  SpO2: 100% (14 Aug 2017 08:00) (93% - 100%)    General Exam   General appearance: No acute distress, well-nourished  Respiratory:    patient with multiple episodes of wet-sounding cough but does appear to be breathing comfortably               Neurological Exam  Mental Status: patient initially with eyes closed but opens them to voice and maintains eye contact, when asked questions patient appears to be attempting to mouth responses but does not provide any verbal responses, patient is able to squeeze hands bilaterally on command but does not show two fingers    Cranial Nerves: PERRL at 3mm, visual fields intact, no facial droop, non-verbal    Motor:   Tone:  increased throughout            Strength:  Upper extremity: moving bilateral UE's spontaneously and purposefully, at least 3/5    Lower extremity: right LE appears externally rotated and flexed, withdraws feet to stimuli bilaterally    Tremor:  none appreciated at rest or in action    Sensation: withdraws extremities to pain     Toes flexor bilaterally mute    Other Studies    08-14    141  |  99  |  59<H>  ----------------------------<  129<H>  2.8<LL>   |  29  |  1.53<H>    Ca    9.0      14 Aug 2017 03:22  Phos  4.6     08-14  Mg     2.0     08-14    TPro  6.1  /  Alb  2.5<L>  /  TBili  2.4<H>  /  DBili  x   /  AST  21  /  ALT  21  /  AlkPhos  101  08-14 08-14    141  |  99  |  59<H>  ----------------------------<  129<H>  2.8<LL>   |  29  |  1.53<H>    Ca    9.0      14 Aug 2017 03:22  Phos  4.6     08-14  Mg     2.0     08-14    TPro  6.1  /  Alb  2.5<L>  /  TBili  2.4<H>  /  DBili  x   /  AST  21  /  ALT  21  /  AlkPhos  101  08-14    LIVER FUNCTIONS - ( 14 Aug 2017 03:22 )  Alb: 2.5 g/dL / Pro: 6.1 g/dL / ALK PHOS: 101 U/L / ALT: 21 U/L RC / AST: 21 U/L / GGT: x             Radiology    CTh:   < from: CT Head No Cont (08.12.17 @ 16:29) >  There is no acute intracranial hemorrhage or mass effect. Areas of   decreased attenuation are againnoted to involve the left middle frontal < from: CT Head No Cont (08.12.17 @ 16:29) >  IMPRESSION:   No acute intracranial hemorrhage or mass effect.   Redemonstrated chronic infarcts as described.  When compared with prior CT brain 8/8/2017, no significant interval   change.    < end of copied text >    gyrus, right inferior frontal gyrus, and central posterior right frontal   convexity, and anterior right parietal convexity, compatible with chronic   infarcts, and unchanged since 8/8/2017. There is stable mild cerebral   volume loss with periventricular hypoattenuation compatible with chronic   microvascular ischemic changes. The ventricles and sulci are stable in   size.    < end of copied text >

## 2017-08-14 NOTE — PROGRESS NOTE ADULT - SUBJECTIVE AND OBJECTIVE BOX
Surgery Blue Team Progress Note:  Hospital Day 7 Post-procedure Date 6 s/p IR embolization of hemorrhagic hepatic lesion (08/08/17)    Subjective:  Patient examined at bedside, no acute overnight events.  Patient remains nonverbal (since admission) but follows commands (squeezes fingers, moves legs).  She is tolerating minesh-tube feeds, and had a bm yesterday, 08/13/17.  Head CT performed two days ago (08/12/17) shows no signs of new infarctions/bleeds; neurology was consulted since patient still remains nonverbal/has not returned to home baseline.    Objective:  Vitals:  ICU Vital Signs Last 24 Hrs  T(C): 36.9 (14 Aug 2017 11:00), Max: 37.1 (13 Aug 2017 20:00)  T(F): 98.5 (14 Aug 2017 11:00), Max: 98.7 (13 Aug 2017 20:00)  HR: 83 (14 Aug 2017 11:00) (71 - 130)  BP: 137/66 (14 Aug 2017 11:00) (119/56 - 176/93)  BP(mean): 95 (14 Aug 2017 11:00) (75 - 125)  RR: 19 (14 Aug 2017 11:00) (17 - 25)  SpO2: 98% (14 Aug 2017 11:00) (93% - 100%)    Labs:                        10.2   15.6  )-----------( 199      ( 14 Aug 2017 03:22 )             31.4       08-14    141  |  99  |  59<H>  ----------------------------<  129<H>  2.8<LL>   |  29  |  1.53<H>    Ca    9.0      14 Aug 2017 03:22  Phos  4.6     08-14  Mg     2.0     08-14    TPro  6.1  /  Alb  2.5<L>  /  TBili  2.4<H>  /  DBili  x   /  AST  21  /  ALT  21  /  AlkPhos  101  08-14      I&O's Detail    13 Aug 2017 07:01  -  14 Aug 2017 07:00  --------------------------------------------------------  IN:    Glucerna: 360 mL  Total IN: 360 mL    OUT:    Indwelling Catheter - Urethral: 3445 mL  Total OUT: 3445 mL    Total NET: -3085 mL      14 Aug 2017 07:01  -  14 Aug 2017 11:29  --------------------------------------------------------  IN:    Enteral Tube Flush: 200 mL    Glucerna: 80 mL  Total IN: 280 mL    OUT:    Indwelling Catheter - Urethral: 610 mL    Stool: 3 mL  Total OUT: 613 mL    Total NET: -333 mL    Focused Physical Exam:  General: NAD, nonverbal  Respiratory: Nonlabored breathing  Abdomen: Mildly distended asymmetrically to RUQ, mildly tympanic, nontender    Medications:  MEDICATIONS  (STANDING):  insulin lispro (HumaLOG) corrective regimen sliding scale   SubCutaneous every 6 hours  aspirin  chewable 81 milliGRAM(s) Oral daily  metoprolol 50 milliGRAM(s) Oral every 12 hours  enoxaparin Injectable 30 milliGRAM(s) SubCutaneous daily  pantoprazole   Suspension 40 milliGRAM(s) Oral daily  metolazone 5 milliGRAM(s) Oral daily  furosemide   Injectable 40 milliGRAM(s) IV Push two times a day  potassium chloride   Solution 20 milliEquivalent(s) Oral every 2 hours  potassium chloride   Solution 20 milliEquivalent(s) Oral every 12 hours  amiodarone IVPB 150 milliGRAM(s) IV Intermittent once  amiodarone Infusion 1 mG/Min (33.333 mL/Hr) IV Continuous <Continuous>  amiodarone Infusion 0.5 mG/Min (16.667 mL/Hr) IV Continuous <Continuous>    MEDICATIONS  (PRN):  polyethylene glycol 3350 17 Gram(s) Oral daily PRN Constipation    Imaging:  Chest Xray performed 08/14/17 for assessment of NGT:  IMPRESSION:  Enteric tube adjusted, with tip still in stomach. Right pleural effusion.    CT Head performed 08/12/17  IMPRESSION:   No acute intracranial hemorrhage or mass effect.   Redemonstrated chronic infarcts as described.  When compared with prior CT brain 8/8/2017, no significant interval   change.

## 2017-08-14 NOTE — CONSULT NOTE ADULT - ASSESSMENT
84 year old female with intraperitoneal hemorrhage from a cytic liver lesion, possible a mass.  Patient s/p embolization.  We are called for GOC/medical decision making. 84 year old female with intraperitoneal hemorrhage from a cytic liver lesion, possibly a mass.  Patient s/p embolization.  We are called for GOC/medical decision making.

## 2017-08-14 NOTE — CONSULT NOTE ADULT - ASSESSMENT
84 year old female with significant PMH including afib on Xarelto presenting with falls found to have hepatic hemorrhage s/p embolization and intubation who has not returned to her baseline mental status. 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   This likely reflects sequelae of global hypoperfusion in setting of multiple, chronic infarcts and patient will likely return to her baseline with time.   Patient however would benefit from MRI to assess for any new infarcts that the CT may not have detected, however this would likely not  as patient is already anticoagulated.   Infarcts also serve as foci for seizures, but this is less likely considering the AMS is persistent and not episodic and without any kesha seizure-like activity.

## 2017-08-14 NOTE — CONSULT NOTE ADULT - ASSESSMENT
This is a 84 year old female with PMH Afib (was on Xarelto), CAD, s/p mitral clip, CABG x 3 (7/1/2013), Aflutter, HF, EF 35-40%, s/p single chamber ICD (BSC) on 5/23/17 for inducible sustained monomorphic VT, breast CA s/p resection/chemo, HTN, HLD, DM2, p/w SOB and abdominal pain, found to have large intraperitoneal bleed s/p IR embolization of hemorrhagic hepatic lesion on 8/8/17. Course c/b s/p ICD shock today for monomorphic VT in the setting of K of 2.8.

## 2017-08-14 NOTE — CONSULT NOTE ADULT - ATTENDING COMMENTS
Dr. Burger (Attending Physician)  Fentanyl for pain control.  Precedex if needed for sedation. Will wake up and wean to extubate if H/H remain stable. Ho AFib on Rivoraxaban pw Hemorrhagic Shock from active extravasation from liver mass s/p 4 RBC, 8 FFP, 2500 PCC, 10 mg Vit K, 1 Plts.  Now HD stable off pressors with afib HR 90s-110s.  Ho systolic CHF will start home metoprolol, hydralazine, lisinopril once proven to be hd stable. CKD stage 3 will monitor UO.
84 year old female with significant PMH including afib on Xarelto presenting with falls found to have hepatic hemorrhage s/p embolization and intubation who has not returned to her baseline mental status. 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   This likely reflects sequelae of global watershed hypoperfusion in setting of multiple, chronic infarcts and subcortical leukoencephalopthy.  Patient however would benefit from MRI to assess for any new infarcts that the CT may not have detected, however this would likely not  as patient is already anticoagulated.   Infarcts also serve as foci for seizures, but this is less likely considering the AMS is persistent and not episodic and without any kesha seizure-like activity.Recc Nonurgent EEG
Scope reveals normal upper airway exam except for markedly decreased sensation of glottis which is impairing her ability to protect her airway. However no pooling of secretions. She can phonate with encouragement but only briefly as she is too deconditioned and has poor breath support. Suspect as pulm status improves her voice will improve as well. No further ENT intervention. Please call with questions [phone].
I have personally seen and examined this patient and agree with the above assessment and plan.

## 2017-08-14 NOTE — PROGRESS NOTE ADULT - SUBJECTIVE AND OBJECTIVE BOX
84 year old female s/p fall with bleeding 10.2 cm liver cyst s/p IR embolization complicated by altered mental status. CT Head was unchanged from prior exam.     24 Hour Events: Patient was noted to have altered mental status since she was extubated. After speaking with her son, it was determined that this is not her baseline. Neurology and Palliative Care were called. Her NGT was exchanged for a KO feeding tube. Her RUE was significantly more swollen than her left and duplex of her RUE was ordered. She was on BiPAP overnight and tube feeds were held at that time. 84 year old female s/p fall with bleeding 10.2 cm liver cyst s/p IR embolization complicated by altered mental status. CT Head was unchanged from prior exam.     24 Hour Events: Patient was noted to have altered mental status since she was extubated. After speaking with her son, it was determined that this is not her baseline. Neurology and Palliative Care were called. Her NGT was exchanged for a KO feeding tube. Her RUE was significantly more swollen than her left and duplex of her RUE was ordered. She was on BiPAP overnight and tube feeds were held at that time.     SUBJECTIVE/ROS:  [ ] A ten-point review of systems was otherwise negative except as noted.  [x] Due to altered mental status/intubation, subjective information were not able to be obtained from the patient. History was obtained, to the extent possible, from review of the chart and collateral sources of information.      NEURO  RASS: -2  Exam: patient can track however does not interact meaningfully with the team  Meds: N/A  [x] Adequacy of sedation and pain control has been assessed and adjusted      RESPIRATORY  RR: 20 (08-14-17 @ 05:00) (17 - 26)  SpO2: 99% (08-14-17 @ 05:00) (93% - 100%)  Wt(kg): --  Exam: unlabored, clear to auscultation bilaterally  Mechanical Ventilation:   ABG - ( 13 Aug 2017 02:40 )  pH: 7.46  /  pCO2: 34    /  pO2: 110   / HCO3: 24    / Base Excess: .8    /  SaO2: 99      Lactate: x                [ ] Extubation Readiness Assessed- N/A  Meds:       CARDIOVASCULAR  HR: 83 (08-14-17 @ 05:00) (83 - 130)  BP: 137/64 (08-14-17 @ 05:00) (108/55 - 176/93)  BP(mean): 89 (08-14-17 @ 05:00) (72 - 125)  ABP: --  ABP(mean): --  Wt(kg): --  CVP(cm H2O): --      Exam: regular rate and rhythm   Cardiac Rhythm: sinus rhythm  Perfusion     [x]Adequate   [ ]Inadequate  Mentation   [ ]Normal       [x]Reduced  Extremities  [ ]Warm         [x]Cool  Volume Status [ ]Hypervolemic [x]Euvolemic [ ]Hypovolemic  Meds: metoprolol 50 milliGRAM(s) Oral every 12 hours  metolazone 5 milliGRAM(s) Oral daily  furosemide   Injectable 40 milliGRAM(s) IV Push two times a day        GI/NUTRITION  Exam: soft, nontender, moderately distended, KO feeding tube in place  Diet: NPO/ tube feeds  Meds: pantoprazole   Suspension 40 milliGRAM(s) Oral daily  polyethylene glycol 3350 17 Gram(s) Oral daily PRN Constipation      GENITOURINARY  I&O's Detail    08-12 @ 07:01 - 08-13 @ 07:00  --------------------------------------------------------  IN:    dextrose 5% + sodium chloride 0.45% with potassium chloride 20 mEq/L: 250 mL    Enteral Tube Flush: 30 mL    Glucerna: 120 mL    IV PiggyBack: 200 mL  Total IN: 600 mL    OUT:    Indwelling Catheter - Urethral: 3305 mL  Total OUT: 3305 mL    Total NET: -2705 mL      08-13 @ 07:01 - 08-14 @ 05:44  --------------------------------------------------------  IN:    Glucerna: 340 mL  Total IN: 340 mL    OUT:    Indwelling Catheter - Urethral: 2945 mL  Total OUT: 2945 mL    Total NET: -2605 mL          08-14    141  |  99  |  59<H>  ----------------------------<  129<H>  2.8<LL>   |  29  |  1.53<H>    Ca    9.0      14 Aug 2017 03:22  Phos  4.6     08-14  Mg     2.0     08-14    TPro  6.1  /  Alb  2.5<L>  /  TBili  2.4<H>  /  DBili  x   /  AST  21  /  ALT  21  /  AlkPhos  101  08-14    [ ] Reyez catheter, indication: N/A  Meds: potassium chloride   Solution 20 milliEquivalent(s) Oral every 2 hours        HEMATOLOGIC  Meds: aspirin  chewable 81 milliGRAM(s) Oral daily  enoxaparin Injectable 30 milliGRAM(s) SubCutaneous daily    [x] VTE Prophylaxis                        10.2   15.6  )-----------( 199      ( 14 Aug 2017 03:22 )             31.4     PT/INR - ( 14 Aug 2017 03:22 )   PT: 16.5 sec;   INR: 1.50 ratio         PTT - ( 14 Aug 2017 03:22 )  PTT:31.1 sec  Transfusion     [ ] PRBC   [ ] Platelets   [ ] FFP   [ ] Cryoprecipitate      INFECTIOUS DISEASES  T(C): 37 (08-14-17 @ 03:00), Max: 37.1 (08-13-17 @ 20:00)  Wt(kg): --  WBC Count: 15.6 K/uL (08-14 @ 03:22)    Recent Cultures: N/A    Meds: N/A      ENDOCRINE  Capillary Blood Glucose  123 (14 Aug 2017 05:36)  131 (14 Aug 2017 00:00)  126 (13 Aug 2017 18:00)  111 (13 Aug 2017 12:00)    Meds: insulin lispro (HumaLOG) corrective regimen sliding scale   SubCutaneous every 6 hours        ACCESS DEVICES:  [x] Peripheral IV  [ ] Central Venous Line	[ ] R	[ ] L	[ ] IJ	[ ] Fem	[ ] SC	Placed:   [ ] Arterial Line		[ ] R	[ ] L	[ ] Fem	[ ] Rad	[ ] Ax	Placed:   [ ] PICC:					[ ] Mediport  [ ] Urinary Catheter, Date Placed:   [ ] Necessity of urinary, arterial, and venous catheters discussed    OTHER MEDICATIONS: N/A      CODE STATUS: N/A    IMAGING: CXR pending, Duplex pending

## 2017-08-14 NOTE — CONSULT NOTE ADULT - SUBJECTIVE AND OBJECTIVE BOX
CHIEF COMPLAINT:Patient is a 84y old  Female who presents with a chief complaint of s/p fall (08 Aug 2017 09:42)      HISTORY OF PRESENT ILLNESS:HPI:  84F w/hx of Afib on Xarelto, MitraClip, sHF, presented to ED w/SOB and abdominal pain after multiple falls. Found to have hemorrhagic hepatic lesion with hemorrhagic shock now Hospital Day 7 Post-procedure Date 6 s/p IR embolization of hemorrhagic hepatic lesion (08/08/17)  Patient remains nonverbal (since admission) but follows commands (squeezes fingers, moves legs).  She is tolerating minesh-tube feeds, and had a bm yesterday, 08/13/17.  Head CT performed two days ago (08/12/17) shows no signs of new infarctions/bleeds. I was consulted for VT earlier today with ICD shock. She is off AC given life threatening bleed.       PAST MEDICAL & SURGICAL HISTORY:  Heart failure  Type 2 diabetes mellitus: Diet controlled, Hg A1C 6.1 ( 4/22/17)  Dyspnea: at rest  Non-rheumatic mitral regurgitation: Severe  Breast cancer: s/p resection, chemo x 3 treatments only, completed radiation  Hypertension  HLD (hyperlipidemia)  PAF (paroxysmal atrial fibrillation): diagnosed 7/2013  CAD (coronary artery disease)  Cataract: b/l  Gout  GERD (gastroesophageal reflux disease)  Mitral regurgitation: s/p Mitraclip 5/2017  Cardiac pacemaker: AICD  S/P breast lumpectomy: left 7/20/2016  S/P cataract extraction: bilaterally-2014 withy artificial lenses  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  S/P hysterectomy          MEDICATIONS:  aspirin  chewable 81 milliGRAM(s) Oral daily  metoprolol 50 milliGRAM(s) Oral every 12 hours  metolazone 5 milliGRAM(s) Oral daily  enoxaparin Injectable 40 milliGRAM(s) SubCutaneous every 24 hours  furosemide   Injectable 20 milliGRAM(s) IV Push every 12 hours          pantoprazole   Suspension 40 milliGRAM(s) Oral daily  polyethylene glycol 3350 17 Gram(s) Oral daily PRN    insulin lispro (HumaLOG) corrective regimen sliding scale   SubCutaneous every 6 hours    potassium chloride   Solution 20 milliEquivalent(s) Oral every 12 hours      FAMILY HISTORY:  No pertinent family history in first degree relatives      Non-contributory    SOCIAL HISTORY:    not an active smoker    Allergies    No Known Allergies    Intolerances    	    REVIEW OF SYSTEMS:  unable to obtain as pt nonverbal     All other ROS negative    PHYSICAL EXAM:  T(C): 37 (08-14-17 @ 15:00), Max: 37.1 (08-13-17 @ 20:00)  HR: 92 (08-14-17 @ 18:00) (69 - 115)  BP: 123/58 (08-14-17 @ 18:00) (119/56 - 160/96)  RR: 23 (08-14-17 @ 18:00) (17 - 26)  SpO2: 98% (08-14-17 @ 18:00) (96% - 100%)  Wt(kg): --  I&O's Summary    13 Aug 2017 07:01  -  14 Aug 2017 07:00  --------------------------------------------------------  IN: 360 mL / OUT: 3445 mL / NET: -3085 mL    14 Aug 2017 07:01  -  14 Aug 2017 18:11  --------------------------------------------------------  IN: 916.6 mL / OUT: 1264 mL / NET: -347.4 mL        Appearance: Normal	  HEENT:   Normal oral mucosa, EOMI	  Lymphatic: No lymphadenopathy  Cardiovascular: Normal S1 S2, No JVD, No murmurs, No edema  Respiratory: Lungs clear to auscultation	  Psychiatry: Nonverbal, does not follow all  commands  Gastrointestinal:  Soft, Non-tender, + BS	  Skin: No rashes, No ecchymoses, No cyanosis	  Neurologic: Nonverbal, does not follow all  commands  Extremities:  No clubbing, cyanosis or edema  Vascular: Peripheral pulses palpable 2+ bilaterally  	    ECG:  	AF RVR at 121bpm, LAFB  RADIOLOGY:  	  	  CARDIAC MARKERS:  Troponin T, Serum: 0.03 ng/mL (08-14 @ 12:30)                          10.2   15.6  )-----------( 199      ( 14 Aug 2017 03:22 )             31.4     08-14    141  |  97  |  60<H>  ----------------------------<  174<H>  3.3<L>   |  26  |  1.57<H>    Ca    8.8      14 Aug 2017 12:30  Phos  4.4     08-14  Mg     1.9     08-14    TPro  6.1  /  Alb  2.5<L>  /  TBili  2.4<H>  /  DBili  x   /  AST  21  /  ALT  21  /  AlkPhos  101  08-14    proBNP: Serum Pro-Brain Natriuretic Peptide: 23057 pg/mL (08-10 @ 02:42)  Serum Pro-Brain Natriuretic Peptide: 61202 pg/mL (08-08 @ 06:16)    Lipid Profile:   HgA1c:   TSH: Thyroid Stimulating Hormone, Serum: 6.14 uIU/mL (08-14 @ 06:04)      < from: TTE with Doppler (w/Cont) (07.28.17 @ 18:57) >  Conclusions:  1. Mitraclip seen attached to both anterior and posterior  mitral valve leaflets. Moderate mitral regurgitation. Mean  transmitral valve gradient equals 6-7 mm Hg (at HR 70-80  bpm), which is elevated even in the setting of a repaired  valve.  2. Moderate segmental left ventricular systolic  dysfunction.  Endocardial visualization enhanced with  intravenous injection of echo contrast (Definity).  No left  ventricular thrombus. The apical segments are akinetic. The  inferoseptum and anteroseptum are hypokinetic.  3. A device wire is noted in the right heart.  Right  ventricular enlargement with decreased right ventricular  systolic function.  4. No pericardial effusion seen.            Assesment/Plan:   84F w/hx of Afib on Xarelto, MitraClip, sHF, presented to ED w/SOB and abdominal pain after multiple falls. Found to have hemorrhagic hepatic lesion with hemorrhagic shock now Hospital Day 7 Post-procedure Date 6 s/p IR embolization of hemorrhagic hepatic lesion (08/08/17)  Patient remains nonverbal (since admission) but follows commands (squeezes fingers, moves legs).  She is tolerating minesh-tube feeds, and had a bm yesterday, 08/13/17.  Head CT performed two days ago (08/12/17) shows no signs of new infarctions/bleeds. I was consulted for VT earlier today with ICD shock. She is off AC given life threatening bleed.   1. VT in the setting of significant hypokalemia this am. Would hold off Amio gtt at this time given AF not on AC with concern for conversion to SR with increase incidence of acute CVA  - EP Dr Herbert consulted. ICD placed by Dr Herbert in May in setting of inducible VT on EPS study with moderate reduced EF      2. AF - rate control with Metoprolol. Off AC as life threatening bleed recently     3. sHF- diurese to keep net negative. Agree with Lasix and Metolazone for now and titrate to urine output and kidney function  - Agree with Adding Aldactone for both sHF and hypokalemia. Monitor ins/outs and kidney function  - continue with Metoprolol, ASA    4. HTN - Well controlled on current regimen    5. Neuro - pt nonverbal still. Unclear Etiology. Neuro consult noted        Patel Amaya DO [facility]  [facility]  [phone] CHIEF COMPLAINT:Patient is a 84y old  Female who presents with a chief complaint of s/p fall (08 Aug 2017 09:42)      HISTORY OF PRESENT ILLNESS:HPI:  84F w/hx of Afib on Xarelto, MitraClip, sHF, presented to ED w/SOB and abdominal pain after multiple falls. Found to have hemorrhagic hepatic lesion with hemorrhagic shock now Hospital Day 7 Post-procedure Date 6 s/p IR embolization of hemorrhagic hepatic lesion (08/08/17)  Patient remains nonverbal (since admission) but follows commands (squeezes fingers, moves legs).  She is tolerating minesh-tube feeds, and had a bm yesterday, 08/13/17.  Head CT performed two days ago (08/12/17) shows no signs of new infarctions/bleeds. I was consulted for VT earlier today with ICD shock. She is off AC given life threatening bleed.       PAST MEDICAL & SURGICAL HISTORY:  Heart failure  Type 2 diabetes mellitus: Diet controlled, Hg A1C 6.1 ( 4/22/17)  Dyspnea: at rest  Non-rheumatic mitral regurgitation: Severe  Breast cancer: s/p resection, chemo x 3 treatments only, completed radiation  Hypertension  HLD (hyperlipidemia)  PAF (paroxysmal atrial fibrillation): diagnosed 7/2013  CAD (coronary artery disease)  Cataract: b/l  Gout  GERD (gastroesophageal reflux disease)  Mitral regurgitation: s/p Mitraclip 5/2017  Cardiac pacemaker: AICD  S/P breast lumpectomy: left 7/20/2016  S/P cataract extraction: bilaterally-2014 withy artificial lenses  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  S/P hysterectomy          MEDICATIONS:  aspirin  chewable 81 milliGRAM(s) Oral daily  metoprolol 50 milliGRAM(s) Oral every 12 hours  metolazone 5 milliGRAM(s) Oral daily  enoxaparin Injectable 40 milliGRAM(s) SubCutaneous every 24 hours  furosemide   Injectable 20 milliGRAM(s) IV Push every 12 hours          pantoprazole   Suspension 40 milliGRAM(s) Oral daily  polyethylene glycol 3350 17 Gram(s) Oral daily PRN    insulin lispro (HumaLOG) corrective regimen sliding scale   SubCutaneous every 6 hours    potassium chloride   Solution 20 milliEquivalent(s) Oral every 12 hours      FAMILY HISTORY:  No pertinent family history in first degree relatives      Non-contributory    SOCIAL HISTORY:    not an active smoker    Allergies    No Known Allergies    Intolerances    	    REVIEW OF SYSTEMS:  unable to obtain as pt nonverbal     All other ROS negative    PHYSICAL EXAM:  T(C): 37 (08-14-17 @ 15:00), Max: 37.1 (08-13-17 @ 20:00)  HR: 92 (08-14-17 @ 18:00) (69 - 115)  BP: 123/58 (08-14-17 @ 18:00) (119/56 - 160/96)  RR: 23 (08-14-17 @ 18:00) (17 - 26)  SpO2: 98% (08-14-17 @ 18:00) (96% - 100%)  Wt(kg): --  I&O's Summary    13 Aug 2017 07:01  -  14 Aug 2017 07:00  --------------------------------------------------------  IN: 360 mL / OUT: 3445 mL / NET: -3085 mL    14 Aug 2017 07:01  -  14 Aug 2017 18:11  --------------------------------------------------------  IN: 916.6 mL / OUT: 1264 mL / NET: -347.4 mL        Appearance: Normal	  HEENT:   Normal oral mucosa, EOMI	  Lymphatic: No lymphadenopathy  Cardiovascular: Normal S1 S2, No JVD, No murmurs, No edema  Respiratory: Lungs clear to auscultation	  Psychiatry: Nonverbal, does not follow all  commands  Gastrointestinal:  Soft, Non-tender, + BS	  Skin: No rashes, No ecchymoses, No cyanosis	  Neurologic: Nonverbal, does not follow all  commands  Extremities:  No clubbing, cyanosis or edema  Vascular: Peripheral pulses palpable 2+ bilaterally  	    ECG:  	AF RVR at 121bpm, LAFB  RADIOLOGY:  	  	  CARDIAC MARKERS:  Troponin T, Serum: 0.03 ng/mL (08-14 @ 12:30)                          10.2   15.6  )-----------( 199      ( 14 Aug 2017 03:22 )             31.4     08-14    141  |  97  |  60<H>  ----------------------------<  174<H>  3.3<L>   |  26  |  1.57<H>    Ca    8.8      14 Aug 2017 12:30  Phos  4.4     08-14  Mg     1.9     08-14    TPro  6.1  /  Alb  2.5<L>  /  TBili  2.4<H>  /  DBili  x   /  AST  21  /  ALT  21  /  AlkPhos  101  08-14    proBNP: Serum Pro-Brain Natriuretic Peptide: 53203 pg/mL (08-10 @ 02:42)  Serum Pro-Brain Natriuretic Peptide: 38912 pg/mL (08-08 @ 06:16)    Lipid Profile:   HgA1c:   TSH: Thyroid Stimulating Hormone, Serum: 6.14 uIU/mL (08-14 @ 06:04)      < from: TTE with Doppler (w/Cont) (07.28.17 @ 18:57) >  Conclusions:  1. Mitraclip seen attached to both anterior and posterior  mitral valve leaflets. Moderate mitral regurgitation. Mean  transmitral valve gradient equals 6-7 mm Hg (at HR 70-80  bpm), which is elevated even in the setting of a repaired  valve.  2. Moderate segmental left ventricular systolic  dysfunction.  Endocardial visualization enhanced with  intravenous injection of echo contrast (Definity).  No left  ventricular thrombus. The apical segments are akinetic. The  inferoseptum and anteroseptum are hypokinetic.  3. A device wire is noted in the right heart.  Right  ventricular enlargement with decreased right ventricular  systolic function.  4. No pericardial effusion seen.            Assesment/Plan:   84F w/hx of Afib on Xarelto, MitraClip, sHF, presented to ED w/SOB and abdominal pain after multiple falls. Found to have hemorrhagic hepatic lesion with hemorrhagic shock now Hospital Day 7 Post-procedure Date 6 s/p IR embolization of hemorrhagic hepatic lesion (08/08/17)  Patient remains nonverbal (since admission) but follows commands (squeezes fingers, moves legs).  She is tolerating minesh-tube feeds, and had a bm yesterday, 08/13/17.  Head CT performed two days ago (08/12/17) shows no signs of new infarctions/bleeds. I was consulted for VT earlier today with ICD shock. She is off AC given life threatening bleed.   1. VT in the setting of significant hypokalemia this am. Would hold off Amio gtt at this time given AF not on AC with concern for conversion to SR with increase incidence of acute CVA  - EP Dr Herbert consulted. ICD placed by Dr Herbert in May in setting of inducible VT on EPS study with moderate reduced EF      2. AF - rate control with Metoprolol. Off AC as life threatening bleed recently     3. sHF- diurese to keep net negative. Agree with Lasix and Metolazone for now and titrate to urine output and kidney function  - Agree with Adding Aldactone for both sHF and hypokalemia. Monitor ins/outs and kidney function  - continue with Metoprolol, ASA    4. HTN - Well controlled on current regimen    5. Neuro - pt nonverbal still. Unclear Etiology. Neuro consult noted    -more than 30 minutes of critical care time spent       Patel Amaya DO [facility]  [facility]  [phone]

## 2017-08-14 NOTE — CONSULT NOTE ADULT - SUBJECTIVE AND OBJECTIVE BOX
HPI:  84F w/hx of Afib on Xarelto presented to ED w/SOB and abdominal pain after multiple falls. She reports 2 syncopal episodes yesterday -- during the 2nd one she fell backwards and hit her head. Subsequent to second episode she noted abdominal pain. In the ED a FAST exam demonstrated large amount of intraperitoneal blood; CTA showed confirmed moderate to large amount of hemoperitonuem along with "10.2 cm, complex cyst with internal high attenuation concerning for bleeding. An arterial vessel is noted inside this cystic structure concerning for active bleeding." CT head was also performed and negative for acute injury. A level I trauma activation was called.    On arrival to the ED airway intact, tachycardic to 120s with systolic /80, palpable radial pulses B/L. GCS=15. On secondary survey only significant finding was tense, distended abdomen with diffuse tenderness. A massive transfusion protocol was initiated. Hct = 27, INR = 5.04. She received 2u of PRBC, K-centra, Vitamin K, and 2u of FFP and a L femoral central catheter was placed prior to transfer to Interventional Radiology. She had transient hypotension to 80s systolic after initiation of 1st u PRBCs, increased to 130s systolic with additional PRBCs. (08 Aug 2017 09:42)          PERTINENT PM/SXH:   Heart failure  Type 2 diabetes mellitus  Dyspnea  Non-rheumatic mitral regurgitation  Breast cancer  Cardiac arrhythmia  Cancer  Hypertension  HLD (hyperlipidemia)  PAF (paroxysmal atrial fibrillation)  CAD (coronary artery disease)  Cataract  Gout  HTN  GERD (gastroesophageal reflux disease)    Mitral regurgitation  Cardiac pacemaker  S/P breast lumpectomy  Status post left breast lumpectomy  S/P cataract extraction  S/P CABG  S/P hysterectomy    SOCIAL HISTORY:   Significant other/partner:  [ ] YES  [ ] NO               Children:  [ ] YES  [ ] NO                   Mandaeism/Spirituality:  Substance hx:  [ ] YES   [ ] NO                   Tobacco hx:  [ ] YES  [ ] NO                       Alcohol hx: [ ] YES  [ ] NO         Home Opioid hx:  [ ] YES  [ ] NO   Living Situation: [ ] Home  [ ] Long term care  [ ] Rehab [ ] Other    FAMILY HISTORY:  No pertinent family history in first degree relatives    [ ] Family history non-contributory     BASELINE (I)ADLs (prior to admission):  [place]: [ ] total  [ ] moderate [ ] dependent    ADVANCE DIRECTIVES:    DNR   MOLST  [ ] YES [ ] NO                      [ ] Completed  Health Care Proxy [ ] YES  [ ] NO   [ ] Completed  Living Will  [ ] YES [ ] NO             [ ] Surrogate  [ ] HCP  [ ] Guardian:                                                                  Phone#:    Allergies    No Known Allergies    Intolerances        MEDICATIONS  (STANDING):  insulin lispro (HumaLOG) corrective regimen sliding scale   SubCutaneous every 6 hours  aspirin  chewable 81 milliGRAM(s) Oral daily  metoprolol 50 milliGRAM(s) Oral every 12 hours  enoxaparin Injectable 30 milliGRAM(s) SubCutaneous daily  pantoprazole   Suspension 40 milliGRAM(s) Oral daily  metolazone 5 milliGRAM(s) Oral daily  furosemide   Injectable 40 milliGRAM(s) IV Push two times a day  potassium chloride   Solution 20 milliEquivalent(s) Oral every 2 hours  potassium chloride   Solution 20 milliEquivalent(s) Oral every 12 hours  amiodarone Infusion 1 mG/Min (33.333 mL/Hr) IV Continuous <Continuous>  amiodarone Infusion 0.5 mG/Min (16.667 mL/Hr) IV Continuous <Continuous>    MEDICATIONS  (PRN):  polyethylene glycol 3350 17 Gram(s) Oral daily PRN Constipation      PRESENT SYMPTOMS:  Source: [ ] Patient   [ ] Family   [ ] Team     Pain:                        [ ] No [ ] Yes             [ ] Mild [ ] Moderate [ ] Severe    Onset -  Location -  Duration -  Character -  Alleviating/Aggravating -  Radiation -  Timing -      Dyspnea:                [ ] No [ ] Yes             [ ] Mild [ ] Moderate [ ] Severe    Anxiety:                  [ ] No [ ] Yes             [ ] Mild [ ] Moderate [ ] Severe    Fatigue:                  [ ] No [ ] Yes             [ ] Mild [ ] Moderate [ ] Severe    Nausea:                  [ ] No [ ] Yes             [ ] Mild [ ] Moderate [ ] Severe    Loss of appetite:   [ ] No [ ] Yes             [ ] Mild [ ] Moderate [ ] Severe    Constipation:        [ ] No [ ] Yes             [ ] Mild [ ] Moderate [ ] Severe    Other Symptoms:  [ ] All other review of systems negative   [ ] Unable to obtain due to poor mentation     Karnofsky Performance Score/Palliative Performance Status Version 2:         %    PHYSICAL EXAM:  Vital Signs Last 24 Hrs  T(C): 36.9 (14 Aug 2017 11:00), Max: 37.1 (13 Aug 2017 20:00)  T(F): 98.5 (14 Aug 2017 11:00), Max: 98.7 (13 Aug 2017 20:00)  HR: 74 (14 Aug 2017 11:46) (71 - 130)  BP: 137/66 (14 Aug 2017 11:00) (119/56 - 176/93)  BP(mean): 95 (14 Aug 2017 11:00) (75 - 125)  RR: 19 (14 Aug 2017 11:00) (17 - 25)  SpO2: 98% (14 Aug 2017 11:46) (93% - 100%) I&O's Summary    13 Aug 2017 07:01  -  14 Aug 2017 07:00  --------------------------------------------------------  IN: 360 mL / OUT: 3445 mL / NET: -3085 mL    14 Aug 2017 07:01  -  14 Aug 2017 12:02  --------------------------------------------------------  IN: 280 mL / OUT: 613 mL / NET: -333 mL        General:  [ ] Alert  [ ] Oriented x      [ ] Lethargic  [ ] Agitated   [ ] Cachexia   [ ] Unarousable  [ ] Verbal  [ ] Non-Verbal    HEENT:  [ ] Normal   [ ] Dry mouth   [ ] ET Tube    [ ] Trach  [ ] Oral lesions    Lungs:   [ ] Clear [ ] Tachypnea  [ ] Audible excessive secretions   [ ] Rhonchi        [ ] Right [ ] Left [ ] Bilateral  [ ] Crackles        [ ] Right [ ] Left [ ] Bilateral  [ ] Wheezing     [ ] Right [ ] Left [ ] Bilateral    Cardiovascular:  [ ] Regular [ ] Irregular [ ] Tachycardia   [ ] Bradycardia  [ ] Murmur [ ] Other    Abdomen: [ ] Soft  [ ] Distended   [ ] +BS  [ ] Non tender [ ] Tender  [ ]PEG   [ ]OGT/ NGT   Last BM:       Genitourinary: [ ] Normal [ ] Incontinent   [ ] Oliguria/Anuria   [ ] Reyez    Musculoskeletal:  [ ] Normal   [ ] Weakness  [ ] Bedbound/Wheelchair bound [ ] Edema    Neurological: [ ] No focal deficits  [ ] Cognitive impairment  [ ] Dysphagia [ ] Dysarthria [ ] Paresis [ ] Other     Skin: [ ] Normal   [ ] Pressure ulcer(s)                  [ ] Rash    LABS:                        10.2   15.6  )-----------( 199      ( 14 Aug 2017 03:22 )             31.4     08-    141  |  99  |  59<H>  ----------------------------<  129<H>  2.8<LL>   |  29  |  1.53<H>    Ca    9.0      14 Aug 2017 03:22  Phos  4.6       Mg     2.0         TPro  6.1  /  Alb  2.5<L>  /  TBili  2.4<H>  /  DBili  x   /  AST  21  /  ALT  21  /  AlkPhos  101  -14    PT/INR - ( 14 Aug 2017 03:22 )   PT: 16.5 sec;   INR: 1.50 ratio         PTT - ( 14 Aug 2017 03:22 )  PTT:31.1 sec  Urinalysis Basic - ( 12 Aug 2017 13:33 )    Color: Yellow / Appearance: Clear / S.007 / pH: x  Gluc: x / Ketone: Negative  / Bili: Negative / Urobili: Negative   Blood: x / Protein: Negative / Nitrite: Negative   Leuk Esterase: Moderate / RBC: 0-2 /HPF / WBC 5-10 /HPF   Sq Epi: x / Non Sq Epi: x / Bacteria: Few /HPF        Shock: [ ] Septic [ ] Cardiogenic [ ] Neurologic [ ] Hypovolemic  Vasopressors x   Inotropes x     Protein Calorie Malnutrition: [ ] Mild [ ] Moderate [ ] Severe    Oral Intake: [ ] Unable/mouth care only [ ] Minimal [ ] Moderate [ ] Full Capability  Diet: [ ] NPO [ ] Tube feeds [ ] TPN [ ] Other     RADIOLOGY & ADDITIONAL STUDIES:    REFERRALS:   [ ] Chaplaincy  [ ] Hospice  [ ] Child Life  [ ] Social Work  [ ] Case management [ ] Holistic Therapy HPI:  84F w/hx of Afib on Xarelto presented to ED w/SOB and abdominal pain after multiple falls. She reports 2 syncopal episodes yesterday -- during the 2nd one she fell backwards and hit her head. Subsequent to second episode she noted abdominal pain. In the ED a FAST exam demonstrated large amount of intraperitoneal blood; CTA showed confirmed moderate to large amount of hemoperitonuem along with "10.2 cm, complex cyst with internal high attenuation concerning for bleeding. An arterial vessel is noted inside this cystic structure concerning for active bleeding." CT head was also performed and negative for acute injury. A level I trauma activation was called.    On arrival to the ED airway intact, tachycardic to 120s with systolic /80, palpable radial pulses B/L. GCS=15. On secondary survey only significant finding was tense, distended abdomen with diffuse tenderness. A massive transfusion protocol was initiated. Hct = 27, INR = 5.04. She received 2u of PRBC, K-centra, Vitamin K, and 2u of FFP and a L femoral central catheter was placed prior to transfer to Interventional Radiology. She had transient hypotension to 80s systolic after initiation of 1st u PRBCs, increased to 130s systolic with additional PRBCs. (08 Aug 2017 09:42)    83 y/o female presented to the ED with SOB and abdominal pain after subsequent fall which was initially identified to be caused by intraperitoneal hemorrhage of large amounts noted on FAST exam that led to a hypotensive state. 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 It is believed to have spread from previous breast Ca. Pt now is nonverbal with suspected AMS.      PERTINENT PM/SXH:   Heart failure  Type 2 diabetes mellitus  Dyspnea  Non-rheumatic mitral regurgitation  Breast cancer  Cardiac arrhythmia  Cancer  Hypertension  HLD (hyperlipidemia)  PAF (paroxysmal atrial fibrillation)  CAD (coronary artery disease)  Cataract  Gout  HTN  GERD (gastroesophageal reflux disease)    Mitral regurgitation  Cardiac pacemaker  S/P breast lumpectomy  Status post left breast lumpectomy  S/P cataract extraction  S/P CABG  S/P hysterectomy    SOCIAL HISTORY:   Significant other/partner:  [ ] YES  [X ] NO               Children:  [X ] YES  [ ] NO                   [employer]ist/Spirituality:  Substance hx:  [ ] YES   [ ] NO                   Tobacco hx:  [ ] YES  [ ] NO                       Alcohol hx: [ ] YES  [ ] NO         Home Opioid hx:  [ ] YES  [ ] NO   Living Situation: [X ] Home  [ ] Long term care  [ ] Rehab [ ] Other    FAMILY HISTORY:  No pertinent family history in first degree relatives    [ ] Family history non-contributory     BASELINE (I)ADLs (prior to admission):  [place]: [X ] total  [ ] moderate [ ] dependent    ADVANCE DIRECTIVES:    DNR   MOLST  [ ] YES [X ] NO                      [ ] Completed  Health Care Proxy [ X] YES  [ ] NO   [ ] Completed  Living Will  [ ] YES [X ] NO             [ ] Surrogate  [ ] HCP  [ ] Guardian:      Jose Joyner                                                Phone#: [phone]    Allergies    No Known Allergies    Intolerances        MEDICATIONS  (STANDING):  insulin lispro (HumaLOG) corrective regimen sliding scale   SubCutaneous every 6 hours  aspirin  chewable 81 milliGRAM(s) Oral daily  metoprolol 50 milliGRAM(s) Oral every 12 hours  enoxaparin Injectable 30 milliGRAM(s) SubCutaneous daily  pantoprazole   Suspension 40 milliGRAM(s) Oral daily  metolazone 5 milliGRAM(s) Oral daily  furosemide   Injectable 40 milliGRAM(s) IV Push two times a day  potassium chloride   Solution 20 milliEquivalent(s) Oral every 2 hours  potassium chloride   Solution 20 milliEquivalent(s) Oral every 12 hours  amiodarone Infusion 1 mG/Min (33.333 mL/Hr) IV Continuous <Continuous>  amiodarone Infusion 0.5 mG/Min (16.667 mL/Hr) IV Continuous <Continuous>    MEDICATIONS  (PRN):  polyethylene glycol 3350 17 Gram(s) Oral daily PRN Constipation      PRESENT SYMPTOMS:  Source: [ ] Patient   [ ] Family   [X ] Team     Pain:                        [ ] No [X ] Yes             [ ] Mild [ ] Moderate [ ] Severe    Onset -  Location -  Duration -  Character -  Alleviating/Aggravating -  Radiation -  Timing -      Dyspnea:                [ ] No [ ] Yes             [ ] Mild [ ] Moderate [ ] Severe    Anxiety:                  [ ] No [ ] Yes             [ ] Mild [ ] Moderate [ ] Severe    Fatigue:                  [ ] No [ ] Yes             [ ] Mild [ ] Moderate [ ] Severe    Nausea:                  [ ] No [ ] Yes             [ ] Mild [ ] Moderate [ ] Severe    Loss of appetite:   [ ] No [ ] Yes             [ ] Mild [ ] Moderate [ ] Severe    Constipation:        [X ] No [ ] Yes             [ ] Mild [ ] Moderate [ ] Severe    Other Symptoms:  [ ] All other review of systems negative   [ ] Unable to obtain due to poor mentation     Karnofsky Performance Score/Palliative Performance Status Version 2:         %    PHYSICAL EXAM:  Vital Signs Last 24 Hrs  T(C): 36.9 (14 Aug 2017 11:00), Max: 37.1 (13 Aug 2017 20:00)  T(F): 98.5 (14 Aug 2017 11:00), Max: 98.7 (13 Aug 2017 20:00)  HR: 74 (14 Aug 2017 11:46) (71 - 130)  BP: 137/66 (14 Aug 2017 11:00) (119/56 - 176/93)  BP(mean): 95 (14 Aug 2017 11:00) (75 - 125)  RR: 19 (14 Aug 2017 11:00) (17 - 25)  SpO2: 98% (14 Aug 2017 11:46) (93% - 100%) I&O's Summary    13 Aug 2017 07:  -  14 Aug 2017 07:00  --------------------------------------------------------  IN: 360 mL / OUT: 3445 mL / NET: -3085 mL    14 Aug 2017 07:01  -  14 Aug 2017 12:02  --------------------------------------------------------  IN: 280 mL / OUT: 613 mL / NET: -333 mL        General:  [ ] Alert  [ ] Oriented x      [ X] Lethargic  [ ] Agitated   [ ] Cachexia   [ ] Unarousable  [ ] Verbal  [X ] Non-Verbal    HEENT:  [ ] Normal   [ ] Dry mouth   [ ] ET Tube    [ ] Trach  [ ] Oral lesions      Lungs:   [X ] Clear [ ] Tachypnea  [ ] Audible excessive secretions   [ ] Rhonchi        [ ] Right [ ] Left [ ] Bilateral  [ ] Crackles        [ ] Right [ ] Left [ ] Bilateral  [ ] Wheezing     [ ] Right [ ] Left [ ] Bilateral    Cardiovascular:  [ ] Regular [ ] Irregular [ ] Tachycardia   [ ] Bradycardia  [X ] Murmur [ ] Other    Abdomen: [ ] Soft  [ ] Distended   [ ] +BS  [ ] Non tender [ ] Tender  [ ]PEG   [X]OGT/ NGT   Last BM:         Genitourinary: [ ] Normal [ ] Incontinent   [ ] Oliguria/Anuria   [X ] Reyez    Musculoskeletal:  [ ] Normal   [ X] Weakness  [ ] Bedbound/Wheelchair bound [X ] Edema    Neurological: [ ] No focal deficits  [ ] Cognitive impairment  [ ] Dysphagia [ ] Dysarthria [ ] Paresis [ ] Other     Skin: [X ] Normal   [ ] Pressure ulcer(s)                  [ ] Rash    LABS:                        10.2   15.6  )-----------( 199      ( 14 Aug 2017 03:22 )             31.4     08-    141  |  99  |  59<H>  ----------------------------<  129<H>  2.8<LL>   |  29  |  1.53<H>    Ca    9.0      14 Aug 2017 03:22  Phos  4.6     -  Mg     2.0         TPro  6.1  /  Alb  2.5<L>  /  TBili  2.4<H>  /  DBili  x   /  AST  21  /  ALT  21  /  AlkPhos  101  08-14    PT/INR - ( 14 Aug 2017 03:22 )   PT: 16.5 sec;   INR: 1.50 ratio         PTT - ( 14 Aug 2017 03:22 )  PTT:31.1 sec  Urinalysis Basic - ( 12 Aug 2017 13:33 )    Color: Yellow / Appearance: Clear / S.007 / pH: x  Gluc: x / Ketone: Negative  / Bili: Negative / Urobili: Negative   Blood: x / Protein: Negative / Nitrite: Negative   Leuk Esterase: Moderate / RBC: 0-2 /HPF / WBC 5-10 /HPF   Sq Epi: x / Non Sq Epi: x / Bacteria: Few /HPF        Shock: [ ] Septic [ ] Cardiogenic [ ] Neurologic [ ] Hypovolemic  Vasopressors x   Inotropes x     Protein Calorie Malnutrition: [ ] Mild [ ] Moderate [ ] Severe    Oral Intake: [ ] Unable/mouth care only [ ] Minimal [ ] Moderate [ ] Full Capability  Diet: [ ] NPO [X ] Tube feeds [ ] TPN [ ] Other     RADIOLOGY & ADDITIONAL STUDIES:    REFERRALS:   [ ] Chaplaincy  [ ] Hospice  [ ] Child Life  [ ] Social Work  [ ] Case management [ ] Holistic Therapy HPI:  84F w/hx of Afib on Xarelto presented to ED w/SOB and abdominal pain after multiple falls. She reports 2 syncopal episodes yesterday -- during the 2nd one she fell backwards and hit her head. Subsequent to second episode she noted abdominal pain. In the ED a FAST exam demonstrated large amount of intraperitoneal blood; CTA showed confirmed moderate to large amount of hemoperitonuem along with "10.2 cm, complex cyst with internal high attenuation concerning for bleeding. An arterial vessel is noted inside this cystic structure concerning for active bleeding." CT head was also performed and negative for acute injury. A level I trauma activation was called.    On arrival to the ED airway intact, tachycardic to 120s with systolic /80, palpable radial pulses B/L. GCS=15. On secondary survey only significant finding was tense, distended abdomen with diffuse tenderness. A massive transfusion protocol was initiated. Hct = 27, INR = 5.04. She received 2u of PRBC, K-centra, Vitamin K, and 2u of FFP and a L femoral central catheter was placed prior to transfer to Interventional Radiology. She had transient hypotension to 80s systolic after initiation of 1st u PRBCs, increased to 130s systolic with additional PRBCs. (08 Aug 2017 09:42)    83 y/o female presented to the ED with SOB and abdominal pain after subsequent fall which was initially identified to be caused by intraperitoneal hemorrhage of large amounts noted on FAST exam that led to a hypotensive state. CTA studies confirmed hemoperitoneum with a 10.2 cm cyst that as of today, has been identified as a possible  hepatic mass per primary team.  Pt now is nonverbal with  AMS.      PERTINENT PM/SXH:   Heart failure  Type 2 diabetes mellitus  Dyspnea  Non-rheumatic mitral regurgitation  Breast cancer  Cardiac arrhythmia  Cancer  Hypertension  HLD (hyperlipidemia)  PAF (paroxysmal atrial fibrillation)  CAD (coronary artery disease)  Cataract  Gout  HTN  GERD (gastroesophageal reflux disease)    Mitral regurgitation  Cardiac pacemaker  S/P breast lumpectomy  Status post left breast lumpectomy  S/P cataract extraction  S/P CABG  S/P hysterectomy    SOCIAL HISTORY:   Significant other/partner:  [ ] YES  [X ] NO               Children:  [X ] YES  [ ] NO                   Mosque/Spirituality: Congregation  Substance hx:  [ ] YES   [x ] NO                   Tobacco hx:  [ ] YES  [x ] NO                       Alcohol hx: [ ] YES  [x ] NO         Home Opioid hx:  [ ] YES  [x ] NO   Living Situation: [X ] Home  [ ] Long term care  [ ] Rehab [ ] Other    FAMILY HISTORY:  No pertinent family history in first degree relatives    x[ ] Family history non-contributory     BASELINE (I)ADLs (prior to admission):  [place]: [X ] total  [ ] moderate [ ] dependent    ADVANCE DIRECTIVES:    DNR   MOLST  [ ] YES [X ] NO                      [ ] Completed  Health Care Proxy [ X] YES  [ ] NO   [ ] Completed  Living Will  [ ] YES [X ] NO             [ ] Surrogate  [ ] HCP  [ ] Guardian:      Jose Joyner                                                Phone#: [phone]    Allergies    No Known Allergies    Intolerances        MEDICATIONS  (STANDING):  insulin lispro (HumaLOG) corrective regimen sliding scale   SubCutaneous every 6 hours  aspirin  chewable 81 milliGRAM(s) Oral daily  metoprolol 50 milliGRAM(s) Oral every 12 hours  enoxaparin Injectable 30 milliGRAM(s) SubCutaneous daily  pantoprazole   Suspension 40 milliGRAM(s) Oral daily  metolazone 5 milliGRAM(s) Oral daily  furosemide   Injectable 40 milliGRAM(s) IV Push two times a day  potassium chloride   Solution 20 milliEquivalent(s) Oral every 2 hours  potassium chloride   Solution 20 milliEquivalent(s) Oral every 12 hours  amiodarone Infusion 1 mG/Min (33.333 mL/Hr) IV Continuous <Continuous>  amiodarone Infusion 0.5 mG/Min (16.667 mL/Hr) IV Continuous <Continuous>    MEDICATIONS  (PRN):  polyethylene glycol 3350 17 Gram(s) Oral daily PRN Constipation      PRESENT SYMPTOMS:  Source: [ ] Patient   [ ] Family   [X ] Team  Query of patient - no pain    Pain:                        [ ] No [X ] Yes             [ ] Mild [ ] Moderate [ ] Severe    Onset -  Location -  Duration -  Character -  Alleviating/Aggravating -  Radiation -  Timing -      Dyspnea:                [x ] No [ ] Yes             [ ] Mild [ ] Moderate [ ] Severe    Anxiety:                  [x ] No [ ] Yes             [ ] Mild [ ] Moderate [ ] Severe    Fatigue:                  [ ] No [x ] Yes             [ ] Mild [ ] Moderate [ ] Severe    Nausea:                  [ x] No [ ] Yes             [ ] Mild [ ] Moderate [ ] Severe    Loss of appetite:   [ ] No [x ] Yes             [ ] Mild [ ] Moderate [ ] Severe    Constipation:        [X ] No [ ] Yes             [ ] Mild [ ] Moderate [ ] Severe    Other Symptoms:  [ ] All other review of systems negative   [x ] Unable to obtain due to poor mentation     Karnofsky Performance Score/Palliative Performance Status Version 2:   40      %    PHYSICAL EXAM:  Vital Signs Last 24 Hrs  T(C): 36.9 (14 Aug 2017 11:00), Max: 37.1 (13 Aug 2017 20:00)  T(F): 98.5 (14 Aug 2017 11:00), Max: 98.7 (13 Aug 2017 20:00)  HR: 74 (14 Aug 2017 11:46) (71 - 130)  BP: 137/66 (14 Aug 2017 11:00) (119/56 - 176/93)  BP(mean): 95 (14 Aug 2017 11:00) (75 - 125)  RR: 19 (14 Aug 2017 11:00) (17 - 25)  SpO2: 98% (14 Aug 2017 11:46) (93% - 100%) I&O's Summary    13 Aug 2017 07:01  -  14 Aug 2017 07:00  --------------------------------------------------------  IN: 360 mL / OUT: 3445 mL / NET: -3085 mL    14 Aug 2017 07:01  -  14 Aug 2017 12:02  --------------------------------------------------------  IN: 280 mL / OUT: 613 mL / NET: -333 mL        General:  [ ] Alert  [ ] Oriented x      [ X] Lethargic  [ ] Agitated   [ ] Cachexia   [ ] Unarousable  [ x] Verbal minimal [ ] Non-Verbal    HEENT:  [ ] Normal   [x ] Dry mouth   [ ] ET Tube    [ ] Trach  [ ] Oral lesions      Lungs:   [X ] Clear [ ] Tachypnea  [x ] Audible excessive secretions   [ ] Rhonchi        [ ] Right [ ] Left [ ] Bilateral  [ ] Crackles        [ ] Right [ ] Left [ ] Bilateral  [ ] Wheezing     [ ] Right [ ] Left [ ] Bilateral    Cardiovascular:  [x ] Regular [ ] Irregular [ ] Tachycardia   [ ] Bradycardia  [X ] Murmur [ ] Other +S1 +S2     Abdomen: [ ] Soft  [x ] Distended/firm   [ ] +BS  [ ] Non tender [ ] Tender  [ ]PEG   [X]OGT/ NGT   Last BM:         Genitourinary: [ ] Normal [ ] Incontinent   [ ] Oliguria/Anuria   [X ] Reyez    Musculoskeletal:  [ ] Normal   [ X] Weakness  [ ] Bedbound/Wheelchair bound [X ] Edema    Neurological: [ ] No focal deficits  [x ] Cognitive impairment secondary to critical illness [x ] Dysphagia [ ] Dysarthria [ ] Paresis [ ] Other     Skin: [X ] Normal   [ ] Pressure ulcer(s)                  [ ] Rash    LABS:                        10.2   15.6  )-----------( 199      ( 14 Aug 2017 03:22 )             31.4     08-14    141  |  99  |  59<H>  ----------------------------<  129<H>  2.8<LL>   |  29  |  1.53<H>    Ca    9.0      14 Aug 2017 03:22  Phos  4.6     08-14  Mg     2.0     08-14    TPro  6.1  /  Alb  2.5<L>  /  TBili  2.4<H>  /  DBili  x   /  AST  21  /  ALT  21  /  AlkPhos  101  08-14    PT/INR - ( 14 Aug 2017 03:22 )   PT: 16.5 sec;   INR: 1.50 ratio         PTT - ( 14 Aug 2017 03:22 )  PTT:31.1 sec  Urinalysis Basic - ( 12 Aug 2017 13:33 )    Color: Yellow / Appearance: Clear / S.007 / pH: x  Gluc: x / Ketone: Negative  / Bili: Negative / Urobili: Negative   Blood: x / Protein: Negative / Nitrite: Negative   Leuk Esterase: Moderate / RBC: 0-2 /HPF / WBC 5-10 /HPF   Sq Epi: x / Non Sq Epi: x / Bacteria: Few /HPF        Shock: [ ] Septic [ ] Cardiogenic [ ] Neurologic [ ] Hypovolemic  Vasopressors x   Inotropes x     Protein Calorie Malnutrition: [ ] Mild [ ] Moderate [ ] Severe    Oral Intake: [ ] Unable/mouth care only [ ] Minimal [ ] Moderate [ ] Full Capability  Diet: [ ] NPO [X ] Tube feeds [ ] TPN [ ] Other     RADIOLOGY & ADDITIONAL STUDIES: < from: [facility] Duplex Upper Ext Vein Scan, Right (17 @ 16:24) >  Impression: No DVT identified.  Superficial thrombophlebitis affects the right cephalic vein in the   forearm.          < end of copied text >  rad< from: CT Angio Abdomen and Pelvis w/ IV Cont (17 @ 07:05) >  FINDINGS:    CHEST:     LUNGS AND LARGE AIRWAYS: Patent central airways. No pulmonary nodules.   Mild emphysema. Partial compressive atelectasis of the right lower lobe   from adjacent pleural effusion.  PLEURA: Moderate right pleural effusion .  VESSELS: No thoracic aortic dissection. Atherosclerotic change of the   thoracic aorta and great vessels. No filling defects are notable within   the central pulmonary arterial vessels.  HEART: Cardiomegaly. No pericardial effusion. AICD lead terminates in the   rightventricle. Aortic valvular and mitral annular calcifications.   Moderate coronary artery calcifications. Unchanged left ventricular   apical aneurysm.  MEDIASTINUM AND DELVIS: No lymphadenopathy.  CHEST WALL AND LOWER NECK: Within normal limits.    ABDOMEN AND PELVIS:    LIVER: Large hepatic cystic lesion 8.9 x 9.1 x 10.2 cm (AP by TV by CC).   This cyst previously appeared simple on the prior CT examination. There   are several new small arterial vessels running through the cyst, not   previously seen on the prior exams. One branch may arise from the right   hepatic artery (series 13, images [id number]). The internal contents of the   cystic lesion is also hyperattenuated, measuring approximately 49   Hounsfield units, which is suspicious for hemorrhage. Multiple additional   smaller cysts within the liver are unchanged from prior exam.  BILE DUCTS: Normal caliber.  GALLBLADDER: Within normal limits.  SPLEEN: Within normal limits.  PANCREAS: An uncinate process calcification appears unchanged.  ADRENALS: Within normal limits.  KIDNEYS/URETERS: Multiple bilateral renal cysts, unchanged.    BLADDER: Within normal limits.  REPRODUCTIVE ORGANS: Status post hysterectomy. No pelvic mass.    BOWEL: Mild small bowel wall thickening in the mid abdomen, most   consistent with enteritis. Appendix normal  PERITONEUM: Moderate high attenuation ascites, concerning for   hemoperitoneum.  VESSELS:  No abdominal aortic dissection. Atherosclerotic change of the   aorta and its branches.  RETROPERITONEUM: No retroperitoneal lymphadenopathy or hemorrhage.  ABDOMINAL WALL: Within normal limits.  BONES: Grade 1 anterolisthesis of L4 on L5 with degenerative lumbar   spinal changes.    IMPRESSION:   1.  10.2 cm, complex cyst with internal high attenuation concerning for   bleeding. An arterial vessel is noted inside this cystic structure   concerning for active bleeding.  2.  Moderate to large volume hemoperitoneum.  3.  Small bowel enteritis.      < end of copied text >  < from: IR Procedure (17 @ 12:57) >  OBSERVATIONS  Sonographic evaluation of the access artery:  Patent  Tumor neovascularity with contrast pulling and extravasation was   identified in both segment 5 and cystic artery territories. The segment 6   artery supplied the lateral capsule of the mass.  Vessels embolized to stasis: Segment 5, segment 6 and cystic arteries.  Postembolization angiography demonstrated the vascularization of the   territory associated with the mass. There was no evidence of nontarget   embolization.    IMPRESSION:  Catheter embolization of hepatic mass for symptomatically hemorrhage      < end of copied text >      REFERRALS:   [ ] Chaplaincy  [ ] Hospice  [ ] Child Life  [ ] Social Work  [ ] Case management [ ] Holistic Therapy HPI:  84F w/hx of Afib on Xarelto presented to ED w/SOB and abdominal pain after multiple falls. She reports 2 syncopal episodes yesterday -- during the 2nd one she fell backwards and hit her head. Subsequent to second episode she noted abdominal pain. In the ED a FAST exam demonstrated large amount of intraperitoneal blood; CTA showed confirmed moderate to large amount of hemoperitonuem along with "10.2 cm, complex cyst with internal high attenuation concerning for bleeding. An arterial vessel is noted inside this cystic structure concerning for active bleeding." CT head was also performed and negative for acute injury. A level I trauma activation was called.    On arrival to the ED airway intact, tachycardic to 120s with systolic /80, palpable radial pulses B/L. GCS=15. On secondary survey only significant finding was tense, distended abdomen with diffuse tenderness. A massive transfusion protocol was initiated. Hct = 27, INR = 5.04. She received 2u of PRBC, K-centra, Vitamin K, and 2u of FFP and a L femoral central catheter was placed prior to transfer to Interventional Radiology. She had transient hypotension to 80s systolic after initiation of 1st u PRBCs, increased to 130s systolic with additional PRBCs. (08 Aug 2017 09:42)    83 y/o female presented to the ED with SOB and abdominal pain after subsequent fall which was initially identified to be caused by intraperitoneal hemorrhage of large amounts noted on FAST exam that led to a hypotensive state. CTA studies confirmed hemoperitoneum with a 10.2 cm cyst that as of today, has been identified as a possible  hepatic mass per primary team.  Pt now is nonverbal with  AMS.      PERTINENT PM/SXH:   Heart failure  Type 2 diabetes mellitus  Dyspnea  Non-rheumatic mitral regurgitation  Breast cancer  Cardiac arrhythmia  Cancer  Hypertension  HLD (hyperlipidemia)  PAF (paroxysmal atrial fibrillation)  CAD (coronary artery disease)  Cataract  Gout  HTN  GERD (gastroesophageal reflux disease)    Mitral regurgitation  Cardiac pacemaker  S/P breast lumpectomy  Status post left breast lumpectomy  S/P cataract extraction  S/P CABG  S/P hysterectomy    SOCIAL HISTORY:   Significant other/partner:  [ ] YES  [X ] NO               Children:  [X ] YES  [ ] NO                   Scientologist/Spirituality: Hindu  Substance hx:  [ ] YES   [x ] NO                   Tobacco hx:  [ ] YES  [x ] NO                       Alcohol hx: [ ] YES  [x ] NO         Home Opioid hx:  [ ] YES  [x ] NO   Living Situation: [X ] Home  [ ] Long term care  [ ] Rehab [ ] Other    FAMILY HISTORY:  No pertinent family history in first degree relatives    x[ ] Family history non-contributory     BASELINE (I)ADLs (prior to admission):  Girard: [X ] total  [ ] moderate [ ] dependent    ADVANCE DIRECTIVES:    DNR   MOLST  [ ] YES [X ] NO                      [ ] Completed  Health Care Proxy [ X] YES  [ ] NO   [ ] Completed  Living Will  [ ] YES [X ] NO             [ ] Surrogate  [ ] HCP  [ ] Guardian:      Jose Joyner                                                Phone#: [phone]    Allergies    No Known Allergies    Intolerances        MEDICATIONS  (STANDING):  insulin lispro (HumaLOG) corrective regimen sliding scale   SubCutaneous every 6 hours  aspirin  chewable 81 milliGRAM(s) Oral daily  metoprolol 50 milliGRAM(s) Oral every 12 hours  enoxaparin Injectable 30 milliGRAM(s) SubCutaneous daily  pantoprazole   Suspension 40 milliGRAM(s) Oral daily  metolazone 5 milliGRAM(s) Oral daily  furosemide   Injectable 40 milliGRAM(s) IV Push two times a day  potassium chloride   Solution 20 milliEquivalent(s) Oral every 2 hours  potassium chloride   Solution 20 milliEquivalent(s) Oral every 12 hours  amiodarone Infusion 1 mG/Min (33.333 mL/Hr) IV Continuous <Continuous>  amiodarone Infusion 0.5 mG/Min (16.667 mL/Hr) IV Continuous <Continuous>    MEDICATIONS  (PRN):  polyethylene glycol 3350 17 Gram(s) Oral daily PRN Constipation      PRESENT SYMPTOMS:  Source: [x ] Patient   [ ] Family   [X ] Team  Query of patient - no pain    Pain:                        [x ] No [ ] Yes             [ ] Mild [ ] Moderate [ ] Severe    Onset -  Location -  Duration -  Character -  Alleviating/Aggravating -  Radiation -  Timing -      Dyspnea:                [x ] No [ ] Yes             [ ] Mild [ ] Moderate [ ] Severe    Anxiety:                  [x ] No [ ] Yes             [ ] Mild [ ] Moderate [ ] Severe    Fatigue:                  [ ] No [x ] Yes             [ ] Mild [ ] Moderate [ ] Severe    Nausea:                  [ x] No [ ] Yes             [ ] Mild [ ] Moderate [ ] Severe    Loss of appetite:   [ ] No [x ] Yes             [ ] Mild [ ] Moderate [ ] Severe    Constipation:        [X ] No [ ] Yes             [ ] Mild [ ] Moderate [ ] Severe    Other Symptoms:  [ ] All other review of systems negative   [x ] Unable to obtain due to poor mentation     Karnofsky Performance Score/Palliative Performance Status Version 2:   40      %    PHYSICAL EXAM:  Vital Signs Last 24 Hrs  T(C): 36.9 (14 Aug 2017 11:00), Max: 37.1 (13 Aug 2017 20:00)  T(F): 98.5 (14 Aug 2017 11:00), Max: 98.7 (13 Aug 2017 20:00)  HR: 74 (14 Aug 2017 11:46) (71 - 130)  BP: 137/66 (14 Aug 2017 11:00) (119/56 - 176/93)  BP(mean): 95 (14 Aug 2017 11:00) (75 - 125)  RR: 19 (14 Aug 2017 11:00) (17 - 25)  SpO2: 98% (14 Aug 2017 11:46) (93% - 100%) I&O's Summary    13 Aug 2017 07:01  -  14 Aug 2017 07:00  --------------------------------------------------------  IN: 360 mL / OUT: 3445 mL / NET: -3085 mL    14 Aug 2017 07:01  -  14 Aug 2017 12:02  --------------------------------------------------------  IN: 280 mL / OUT: 613 mL / NET: -333 mL        General:  [ ] Alert  [ ] Oriented x      [ X] Lethargic  [ ] Agitated   [ ] Cachexia   [ ] Unarousable  [ x] Verbal minimal [ ] Non-Verbal    HEENT:  [ ] Normal   [x ] Dry mouth   [ ] ET Tube    [ ] Trach  [ ] Oral lesions      Lungs:   [X ] Clear [ ] Tachypnea  [x ] Audible excessive secretions   [ ] Rhonchi        [ ] Right [ ] Left [ ] Bilateral  [ ] Crackles        [ ] Right [ ] Left [ ] Bilateral  [ ] Wheezing     [ ] Right [ ] Left [ ] Bilateral    Cardiovascular:  [x ] Regular [ ] Irregular [ ] Tachycardia   [ ] Bradycardia  [X ] Murmur [ ] Other +S1 +S2     Abdomen: [ ] Soft  [x ] Distended/firm   [ ] +BS  [ ] Non tender [ ] Tender  [ ]PEG   [X]OGT/ NGT   Last BM:         Genitourinary: [ ] Normal [ ] Incontinent   [ ] Oliguria/Anuria   [X ] Reyez    Musculoskeletal:  [ ] Normal   [ X] Weakness  [ ] Bedbound/Wheelchair bound [X ] Edema    Neurological: [ ] No focal deficits  [x ] Cognitive impairment secondary to critical illness [x ] Dysphagia [ ] Dysarthria [ ] Paresis [ ] Other     Skin: [X ] Normal   [ ] Pressure ulcer(s)                  [ ] Rash    LABS:                        10.2   15.6  )-----------( 199      ( 14 Aug 2017 03:22 )             31.4     08-14    141  |  99  |  59<H>  ----------------------------<  129<H>  2.8<LL>   |  29  |  1.53<H>    Ca    9.0      14 Aug 2017 03:22  Phos  4.6     08-14  Mg     2.0     08-14    TPro  6.1  /  Alb  2.5<L>  /  TBili  2.4<H>  /  DBili  x   /  AST  21  /  ALT  21  /  AlkPhos  101  08-14    PT/INR - ( 14 Aug 2017 03:22 )   PT: 16.5 sec;   INR: 1.50 ratio         PTT - ( 14 Aug 2017 03:22 )  PTT:31.1 sec  Urinalysis Basic - ( 12 Aug 2017 13:33 )    Color: Yellow / Appearance: Clear / S.007 / pH: x  Gluc: x / Ketone: Negative  / Bili: Negative / Urobili: Negative   Blood: x / Protein: Negative / Nitrite: Negative   Leuk Esterase: Moderate / RBC: 0-2 /HPF / WBC 5-10 /HPF   Sq Epi: x / Non Sq Epi: x / Bacteria: Few /HPF        Shock: [ ] Septic [ ] Cardiogenic [ ] Neurologic [ ] Hypovolemic  Vasopressors x   Inotropes x     Protein Calorie Malnutrition: [ ] Mild [ ] Moderate [ ] Severe    Oral Intake: [ ] Unable/mouth care only [ ] Minimal [ ] Moderate [ ] Full Capability  Diet: [ ] NPO [X ] Tube feeds [ ] TPN [ ] Other     RADIOLOGY & ADDITIONAL STUDIES: < from: [facility] Duplex Upper Ext Vein Scan, Right (17 @ 16:24) >  Impression: No DVT identified.  Superficial thrombophlebitis affects the right cephalic vein in the   forearm.          < end of copied text >  rad< from: CT Angio Abdomen and Pelvis w/ IV Cont (17 @ 07:05) >  FINDINGS:    CHEST:     LUNGS AND LARGE AIRWAYS: Patent central airways. No pulmonary nodules.   Mild emphysema. Partial compressive atelectasis of the right lower lobe   from adjacent pleural effusion.  PLEURA: Moderate right pleural effusion .  VESSELS: No thoracic aortic dissection. Atherosclerotic change of the   thoracic aorta and great vessels. No filling defects are notable within   the central pulmonary arterial vessels.  HEART: Cardiomegaly. No pericardial effusion. AICD lead terminates in the   rightventricle. Aortic valvular and mitral annular calcifications.   Moderate coronary artery calcifications. Unchanged left ventricular   apical aneurysm.  MEDIASTINUM AND DELVIS: No lymphadenopathy.  CHEST WALL AND LOWER NECK: Within normal limits.    ABDOMEN AND PELVIS:    LIVER: Large hepatic cystic lesion 8.9 x 9.1 x 10.2 cm (AP by TV by CC).   This cyst previously appeared simple on the prior CT examination. There   are several new small arterial vessels running through the cyst, not   previously seen on the prior exams. One branch may arise from the right   hepatic artery (series 13, images [id number]). The internal contents of the   cystic lesion is also hyperattenuated, measuring approximately 49   Hounsfield units, which is suspicious for hemorrhage. Multiple additional   smaller cysts within the liver are unchanged from prior exam.  BILE DUCTS: Normal caliber.  GALLBLADDER: Within normal limits.  SPLEEN: Within normal limits.  PANCREAS: An uncinate process calcification appears unchanged.  ADRENALS: Within normal limits.  KIDNEYS/URETERS: Multiple bilateral renal cysts, unchanged.    BLADDER: Within normal limits.  REPRODUCTIVE ORGANS: Status post hysterectomy. No pelvic mass.    BOWEL: Mild small bowel wall thickening in the mid abdomen, most   consistent with enteritis. Appendix normal  PERITONEUM: Moderate high attenuation ascites, concerning for   hemoperitoneum.  VESSELS:  No abdominal aortic dissection. Atherosclerotic change of the   aorta and its branches.  RETROPERITONEUM: No retroperitoneal lymphadenopathy or hemorrhage.  ABDOMINAL WALL: Within normal limits.  BONES: Grade 1 anterolisthesis of L4 on L5 with degenerative lumbar   spinal changes.    IMPRESSION:   1.  10.2 cm, complex cyst with internal high attenuation concerning for   bleeding. An arterial vessel is noted inside this cystic structure   concerning for active bleeding.  2.  Moderate to large volume hemoperitoneum.  3.  Small bowel enteritis.      < end of copied text >  < from: IR Procedure (17 @ 12:57) >  OBSERVATIONS  Sonographic evaluation of the access artery:  Patent  Tumor neovascularity with contrast pulling and extravasation was   identified in both segment 5 and cystic artery territories. The segment 6   artery supplied the lateral capsule of the mass.  Vessels embolized to stasis: Segment 5, segment 6 and cystic arteries.  Postembolization angiography demonstrated the vascularization of the   territory associated with the mass. There was no evidence of nontarget   embolization.    IMPRESSION:  Catheter embolization of hepatic mass for symptomatically hemorrhage      < end of copied text >      REFERRALS:   [ ] Chaplaincy  [ ] Hospice  [ ] Child Life  [ ] Social Work  [ ] Case management [ ] Holistic Therapy

## 2017-08-14 NOTE — PROGRESS NOTE ADULT - ASSESSMENT
84 y F s/p fall w/ bleeding 10.cm liver cyst, s/p IR embolization on 08/08/17  -f/u neuro consult and recs; neuro recommends MRI without contrast, MRA head and neck, speech therapy, and monitor/correct K+ and BUN.  -BIPAP PRN for hx resp failure  -continue dvt ppx, trend h/h daily  -PT/rehab planning  -discuss goals of care w/ family given poor mental status

## 2017-08-14 NOTE — CONSULT NOTE ADULT - PROBLEM SELECTOR RECOMMENDATION 9
-s/p ICD shock for monomorphic VT in the setting of hypokalemia.  -supplement to maintain K >4.0 and Mg 2.0  -metoprolol was increased from 50mg BID to 50mg TID
As above.  MRI without contrast, MRA head and neck.  Speech therapy.   Correct potassium and BUN.
Debrox 4gtts to B/L ears BID  F/U as outpt for ear cleaning  S/S eval when appropriate.
s/p embolization and stabilizing.

## 2017-08-14 NOTE — CONSULT NOTE ADULT - SUBJECTIVE AND OBJECTIVE BOX
HPI:  This is a 84 year old female with PMH Afib (was on Xarelto), CAD, s/p mitral clip, CABG x 3 (7/1/2013), Aflutter, HF, EF 35-40%, s/p single chamber ICD (BSC) on 5/23/17 for inducible sustained monomorphic VT, breast CA s/p resection/chemo, HTN, HLD, DM2, p/w SOB and abdominal pain, s/p       PAST MEDICAL & SURGICAL HISTORY:  Heart failure  Type 2 diabetes mellitus: Diet controlled, Hg A1C 6.1 ( 4/22/17)  Dyspnea: at rest  Non-rheumatic mitral regurgitation: Severe  Breast cancer: s/p resection, chemo x 3 treatments only, completed radiation  Hypertension  HLD (hyperlipidemia)  PAF (paroxysmal atrial fibrillation): diagnosed 7/2013  CAD (coronary artery disease)  Cataract: b/l  Gout  GERD (gastroesophageal reflux disease)  Mitral regurgitation: s/p Mitraclip 5/2017  Cardiac pacemaker: AICD  S/P breast lumpectomy: left 7/20/2016  S/P cataract extraction: bilaterally-2014 withy artificial lenses  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  S/P hysterectomy      ROS:    General: Pt denies recent weight loss/fever/chills    Neurological: denies numbness or  sensation loss    HEENT: denies visual changes, no hearing loss, denies sore throat    Cardiovascular: denies chest pain/palpitations/leg edema    Respiratory and Thorax: denies SOB/cough/wheezing    Gastrointestinal: denies abdominal pain/diarrhea/constipation/bloody stool    Genitourinary: denies urinary frequency/urgency/ dysuria    Musculoskeletal: denies joint pain or swelling, denies restricted motion    Skin: denies rashes/sores    Endocrine: denies heat or cold intolerance/excessive thirst    Hematologic: denies abnormal bleeding  	    	  	    		        	    	            MEDICATIONS  (STANDING):  insulin lispro (HumaLOG) corrective regimen sliding scale   SubCutaneous every 6 hours  aspirin  chewable 81 milliGRAM(s) Oral daily  pantoprazole   Suspension 40 milliGRAM(s) Oral daily  metolazone 5 milliGRAM(s) Oral daily  potassium chloride   Solution 20 milliEquivalent(s) Oral every 12 hours  enoxaparin Injectable 40 milliGRAM(s) SubCutaneous every 24 hours  furosemide   Injectable 20 milliGRAM(s) IV Push every 12 hours  metoprolol 50 milliGRAM(s) Oral every 8 hours    MEDICATIONS  (PRN):  polyethylene glycol 3350 17 Gram(s) Oral daily PRN Constipation      Allergies    No Known Allergies    Intolerances        SOCIAL HISTORY:    FAMILY HISTORY:  No pertinent family history in first degree relatives      Vital Signs Last 24 Hrs  T(C): 37 (14 Aug 2017 15:00), Max: 37.1 (13 Aug 2017 20:00)  T(F): 98.6 (14 Aug 2017 15:00), Max: 98.7 (13 Aug 2017 20:00)  HR: 92 (14 Aug 2017 18:00) (69 - 115)  BP: 123/58 (14 Aug 2017 18:00) (119/56 - 160/96)  BP(mean): 83 (14 Aug 2017 18:00) (75 - 122)  RR: 23 (14 Aug 2017 18:00) (17 - 26)  SpO2: 98% (14 Aug 2017 18:00) (96% - 100%)    Physical Exam:    Vital Signs : BP        HR       RR    Constitutional: well developed, well nourished, no deformities and no acute distress    Neurological: Alert & Oriented x 3, PIZARRO, no focal deficits    HEENT: NC/AT, PERRLA, EOMI,  Neck supple.    Respiratory: CTA B/L, No wheezing/crackles/rhonchi    Cardiovascular: (+) S1 & S2, RRR, No m/r/g    Gastrointestinal: soft, NT, nondistended, (+) BS    Genitourinary: non distended bladder, voiding freely    Extremities: No pedal edema, No clubbing, No cyanosis    Skin:  normal skin color and pigmentation, no skin lesions            LABS:                        10.2   15.6  )-----------( 199      ( 14 Aug 2017 03:22 )             31.4     08-14    141  |  97  |  60<H>  ----------------------------<  174<H>  3.3<L>   |  26  |  1.57<H>    Ca    8.8      14 Aug 2017 12:30  Phos  4.4     08-14  Mg     1.9     08-14    TPro  6.1  /  Alb  2.5<L>  /  TBili  2.4<H>  /  DBili  x   /  AST  21  /  ALT  21  /  AlkPhos  101  08-14    PT/INR - ( 14 Aug 2017 03:22 )   PT: 16.5 sec;   INR: 1.50 ratio         PTT - ( 14 Aug 2017 03:22 )  PTT:31.1 sec      RADIOLOGY & ADDITIONAL STUDIES:    TELE:  EKG: HPI:  This is a 84 year old female with PMH Afib (was on Xarelto), CAD, s/p mitral clip, CABG x 3 (7/1/2013), Aflutter, HF, EF 35-40%, s/p single chamber ICD (BSC) on 5/23/17 for inducible sustained monomorphic VT, breast CA s/p resection/chemo, HTN, HLD, DM2, p/w SOB and abdominal pain, found to have large intraperitoneal bleed s/p IR embolization of hemorrhagic hepatic lesion on 8/8/17. Course c/b s/p ICD shock today for monomorphic VT in the setting of K of 2.8.       PAST MEDICAL & SURGICAL HISTORY:  Heart failure  Type 2 diabetes mellitus: Diet controlled, Hg A1C 6.1 ( 4/22/17)  Dyspnea: at rest  Non-rheumatic mitral regurgitation: Severe  Breast cancer: s/p resection, chemo x 3 treatments only, completed radiation  Hypertension  HLD (hyperlipidemia)  PAF (paroxysmal atrial fibrillation): diagnosed 7/2013  CAD (coronary artery disease)  Cataract: b/l  Gout  GERD (gastroesophageal reflux disease)  Mitral regurgitation: s/p Mitraclip 5/2017  Cardiac pacemaker: AICD  S/P breast lumpectomy: left 7/20/2016  S/P cataract extraction: bilaterally-2014 withy artificial lenses  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  S/P hysterectomy      ROS:    General: non-verbal  	    MEDICATIONS  (STANDING):  insulin lispro (HumaLOG) corrective regimen sliding scale   SubCutaneous every 6 hours  aspirin  chewable 81 milliGRAM(s) Oral daily  pantoprazole   Suspension 40 milliGRAM(s) Oral daily  metolazone 5 milliGRAM(s) Oral daily  potassium chloride   Solution 20 milliEquivalent(s) Oral every 12 hours  enoxaparin Injectable 40 milliGRAM(s) SubCutaneous every 24 hours  furosemide   Injectable 20 milliGRAM(s) IV Push every 12 hours  metoprolol 50 milliGRAM(s) Oral every 8 hours    MEDICATIONS  (PRN):  polyethylene glycol 3350 17 Gram(s) Oral daily PRN Constipation      Allergies    No Known Allergies      SOCIAL HISTORY:    FAMILY HISTORY:  No pertinent family history in first degree relatives      Vital Signs Last 24 Hrs  T(C): 37 (14 Aug 2017 15:00), Max: 37.1 (13 Aug 2017 20:00)  T(F): 98.6 (14 Aug 2017 15:00), Max: 98.7 (13 Aug 2017 20:00)  HR: 92 (14 Aug 2017 18:00) (69 - 115)  BP: 123/58 (14 Aug 2017 18:00) (119/56 - 160/96)  BP(mean): 83 (14 Aug 2017 18:00) (75 - 122)  RR: 23 (14 Aug 2017 18:00) (17 - 26)  SpO2: 98% (14 Aug 2017 18:00) (96% - 100%)      Physical Exam:    Appearance: Normal	  HEENT:   Normal oral mucosa, EOMI, (+) NG tube	  Lymphatic: No lymphadenopathy  Cardiovascular: Normal S1 S2, No JVD, No murmurs, No edema  Respiratory: Lungs clear to auscultation	  Psychiatry: Nonverbal, does not follow all  commands  Gastrointestinal:  Soft, Non-tender, + BS  Skin: No rashes, No ecchymoses, No cyanosis	  Neurologic: Nonverbal, does not follow all  commands  Extremities:  No clubbing, cyanosis or edema  Vascular: Peripheral pulses palpable 2+ bilaterally  	      LABS:                        10.2   15.6  )-----------( 199      ( 14 Aug 2017 03:22 )             31.4     08-14    141  |  97  |  60<H>  ----------------------------<  174<H>  3.3<L>   |  26  |  1.57<H>    Ca    8.8      14 Aug 2017 12:30  Phos  4.4     08-14  Mg     1.9     08-14    TPro  6.1  /  Alb  2.5<L>  /  TBili  2.4<H>  /  DBili  x   /  AST  21  /  ALT  21  /  AlkPhos  101  08-14    PT/INR - ( 14 Aug 2017 03:22 )   PT: 16.5 sec;   INR: 1.50 ratio         PTT - ( 14 Aug 2017 03:22 )  PTT:31.1 sec        TELE: Afib 70-100s, PVCs    EKG: AF RVR at 121bpm, LAFB

## 2017-08-14 NOTE — CONSULT NOTE ADULT - PROBLEM SELECTOR RECOMMENDATION 5
Met with patient and son at bedside.  Son would like to wait until issue of liver neoplasm further defined before speaking with up about goals of care.  Emotional support provided. Met with patient and son at bedside.  Son would like to wait until issue of liver neoplasm further defined before speaking with us about goals of care.  Emotional support provided.

## 2017-08-15 DIAGNOSIS — I47.2 VENTRICULAR TACHYCARDIA: ICD-10-CM

## 2017-08-15 LAB
ALBUMIN SERPL ELPH-MCNC: 2.8 G/DL — LOW (ref 3.3–5)
ALP SERPL-CCNC: 112 U/L — SIGNIFICANT CHANGE UP (ref 40–120)
ALT FLD-CCNC: 21 U/L RC — SIGNIFICANT CHANGE UP (ref 10–45)
AMMONIA BLD-MCNC: 38 UMOL/L — SIGNIFICANT CHANGE UP (ref 11–55)
ANION GAP SERPL CALC-SCNC: 13 MMOL/L — SIGNIFICANT CHANGE UP (ref 5–17)
ANION GAP SERPL CALC-SCNC: 15 MMOL/L — SIGNIFICANT CHANGE UP (ref 5–17)
APTT BLD: 32.5 SEC — SIGNIFICANT CHANGE UP (ref 27.5–37.4)
AST SERPL-CCNC: 24 U/L — SIGNIFICANT CHANGE UP (ref 10–40)
BILIRUB DIRECT SERPL-MCNC: 1.2 MG/DL — HIGH (ref 0–0.2)
BILIRUB INDIRECT FLD-MCNC: 0.9 MG/DL — SIGNIFICANT CHANGE UP (ref 0.2–1)
BILIRUB SERPL-MCNC: 2.1 MG/DL — HIGH (ref 0.2–1.2)
BUN SERPL-MCNC: 63 MG/DL — HIGH (ref 7–23)
BUN SERPL-MCNC: 64 MG/DL — HIGH (ref 7–23)
CALCIUM SERPL-MCNC: 8.8 MG/DL — SIGNIFICANT CHANGE UP (ref 8.4–10.5)
CALCIUM SERPL-MCNC: 8.9 MG/DL — SIGNIFICANT CHANGE UP (ref 8.4–10.5)
CHLORIDE SERPL-SCNC: 100 MMOL/L — SIGNIFICANT CHANGE UP (ref 96–108)
CHLORIDE SERPL-SCNC: 99 MMOL/L — SIGNIFICANT CHANGE UP (ref 96–108)
CO2 SERPL-SCNC: 28 MMOL/L — SIGNIFICANT CHANGE UP (ref 22–31)
CO2 SERPL-SCNC: 29 MMOL/L — SIGNIFICANT CHANGE UP (ref 22–31)
CREAT SERPL-MCNC: 1.54 MG/DL — HIGH (ref 0.5–1.3)
CREAT SERPL-MCNC: 1.62 MG/DL — HIGH (ref 0.5–1.3)
CULTURE RESULTS: SIGNIFICANT CHANGE UP
GAS PNL BLDA: SIGNIFICANT CHANGE UP
GAS PNL BLDA: SIGNIFICANT CHANGE UP
GLUCOSE SERPL-MCNC: 125 MG/DL — HIGH (ref 70–99)
GLUCOSE SERPL-MCNC: 153 MG/DL — HIGH (ref 70–99)
HCT VFR BLD CALC: 29.4 % — LOW (ref 34.5–45)
HGB BLD-MCNC: 10.1 G/DL — LOW (ref 11.5–15.5)
INR BLD: 1.41 RATIO — HIGH (ref 0.88–1.16)
MAGNESIUM SERPL-MCNC: 2.2 MG/DL — SIGNIFICANT CHANGE UP (ref 1.6–2.6)
MAGNESIUM SERPL-MCNC: 2.2 MG/DL — SIGNIFICANT CHANGE UP (ref 1.6–2.6)
MCHC RBC-ENTMCNC: 30.6 PG — SIGNIFICANT CHANGE UP (ref 27–34)
MCHC RBC-ENTMCNC: 34.4 GM/DL — SIGNIFICANT CHANGE UP (ref 32–36)
MCV RBC AUTO: 89 FL — SIGNIFICANT CHANGE UP (ref 80–100)
PHOSPHATE SERPL-MCNC: 3.2 MG/DL — SIGNIFICANT CHANGE UP (ref 2.5–4.5)
PHOSPHATE SERPL-MCNC: 3.7 MG/DL — SIGNIFICANT CHANGE UP (ref 2.5–4.5)
PLATELET # BLD AUTO: 211 K/UL — SIGNIFICANT CHANGE UP (ref 150–400)
POTASSIUM SERPL-MCNC: 3.8 MMOL/L — SIGNIFICANT CHANGE UP (ref 3.5–5.3)
POTASSIUM SERPL-MCNC: 3.8 MMOL/L — SIGNIFICANT CHANGE UP (ref 3.5–5.3)
POTASSIUM SERPL-SCNC: 3.8 MMOL/L — SIGNIFICANT CHANGE UP (ref 3.5–5.3)
POTASSIUM SERPL-SCNC: 3.8 MMOL/L — SIGNIFICANT CHANGE UP (ref 3.5–5.3)
PROT SERPL-MCNC: 6.1 G/DL — SIGNIFICANT CHANGE UP (ref 6–8.3)
PROTHROM AB SERPL-ACNC: 15.5 SEC — HIGH (ref 9.8–12.7)
RBC # BLD: 3.3 M/UL — LOW (ref 3.8–5.2)
RBC # FLD: 14.9 % — HIGH (ref 10.3–14.5)
SODIUM SERPL-SCNC: 142 MMOL/L — SIGNIFICANT CHANGE UP (ref 135–145)
SODIUM SERPL-SCNC: 142 MMOL/L — SIGNIFICANT CHANGE UP (ref 135–145)
SPECIMEN SOURCE: SIGNIFICANT CHANGE UP
WBC # BLD: 14.1 K/UL — HIGH (ref 3.8–10.5)
WBC # FLD AUTO: 14.1 K/UL — HIGH (ref 3.8–10.5)

## 2017-08-15 PROCEDURE — 99291 CRITICAL CARE FIRST HOUR: CPT

## 2017-08-15 PROCEDURE — 71010: CPT | Mod: 26

## 2017-08-15 RX ORDER — LOPERAMIDE HCL 2 MG
2 TABLET ORAL EVERY 8 HOURS
Qty: 0 | Refills: 0 | Status: DISCONTINUED | OUTPATIENT
Start: 2017-08-15 | End: 2017-08-15

## 2017-08-15 RX ORDER — SENNA PLUS 8.6 MG/1
5 TABLET ORAL AT BEDTIME
Qty: 0 | Refills: 0 | Status: DISCONTINUED | OUTPATIENT
Start: 2017-08-15 | End: 2017-08-15

## 2017-08-15 RX ORDER — MAGNESIUM SULFATE 500 MG/ML
2 VIAL (ML) INJECTION ONCE
Qty: 0 | Refills: 0 | Status: COMPLETED | OUTPATIENT
Start: 2017-08-15 | End: 2017-08-15

## 2017-08-15 RX ORDER — POTASSIUM CHLORIDE 20 MEQ
20 PACKET (EA) ORAL ONCE
Qty: 0 | Refills: 0 | Status: COMPLETED | OUTPATIENT
Start: 2017-08-15 | End: 2017-08-15

## 2017-08-15 RX ORDER — ACETAMINOPHEN 500 MG
1000 TABLET ORAL EVERY 12 HOURS
Qty: 0 | Refills: 0 | Status: COMPLETED | OUTPATIENT
Start: 2017-08-15 | End: 2017-08-15

## 2017-08-15 RX ORDER — LOPERAMIDE HCL 2 MG
2 TABLET ORAL
Qty: 0 | Refills: 0 | Status: DISCONTINUED | OUTPATIENT
Start: 2017-08-15 | End: 2017-08-21

## 2017-08-15 RX ORDER — ACETAMINOPHEN 500 MG
1000 TABLET ORAL EVERY 12 HOURS
Qty: 0 | Refills: 0 | Status: DISCONTINUED | OUTPATIENT
Start: 2017-08-15 | End: 2017-08-15

## 2017-08-15 RX ORDER — PSYLLIUM SEED (WITH DEXTROSE)
1 POWDER (GRAM) ORAL EVERY 8 HOURS
Qty: 0 | Refills: 0 | Status: DISCONTINUED | OUTPATIENT
Start: 2017-08-15 | End: 2017-08-21

## 2017-08-15 RX ADMIN — Medication 20 MILLIEQUIVALENT(S): at 15:41

## 2017-08-15 RX ADMIN — ENOXAPARIN SODIUM 40 MILLIGRAM(S): 100 INJECTION SUBCUTANEOUS at 18:03

## 2017-08-15 RX ADMIN — Medication 1000 MILLIGRAM(S): at 11:58

## 2017-08-15 RX ADMIN — Medication 400 MILLIGRAM(S): at 11:19

## 2017-08-15 RX ADMIN — Medication 50 MILLIGRAM(S): at 21:08

## 2017-08-15 RX ADMIN — Medication 50 GRAM(S): at 15:21

## 2017-08-15 RX ADMIN — Medication 50 MILLIGRAM(S): at 06:06

## 2017-08-15 RX ADMIN — Medication 2: at 06:06

## 2017-08-15 RX ADMIN — Medication 20 MILLIGRAM(S): at 18:02

## 2017-08-15 RX ADMIN — Medication 2 MILLIGRAM(S): at 22:08

## 2017-08-15 RX ADMIN — Medication 20 MILLIEQUIVALENT(S): at 06:06

## 2017-08-15 RX ADMIN — Medication 2: at 18:02

## 2017-08-15 RX ADMIN — Medication 2: at 12:00

## 2017-08-15 RX ADMIN — Medication 81 MILLIGRAM(S): at 11:30

## 2017-08-15 RX ADMIN — Medication 50 MILLIGRAM(S): at 14:04

## 2017-08-15 RX ADMIN — Medication 20 MILLIGRAM(S): at 05:43

## 2017-08-15 RX ADMIN — Medication 100 MILLIEQUIVALENT(S): at 05:43

## 2017-08-15 RX ADMIN — PANTOPRAZOLE SODIUM 40 MILLIGRAM(S): 20 TABLET, DELAYED RELEASE ORAL at 11:30

## 2017-08-15 RX ADMIN — SPIRONOLACTONE 50 MILLIGRAM(S): 25 TABLET, FILM COATED ORAL at 06:06

## 2017-08-15 RX ADMIN — Medication 1 PACKET(S): at 14:04

## 2017-08-15 RX ADMIN — Medication 20 MILLIEQUIVALENT(S): at 18:03

## 2017-08-15 RX ADMIN — Medication 1 PACKET(S): at 21:08

## 2017-08-15 NOTE — CHART NOTE - NSCHARTNOTEFT_GEN_A_CORE
Hospital Course: Pt S/P fall with bleeding 10.2 cm liver cyst, S/P IR embolization complicated by altered mental status. Pt now extubated, on Bipap. GOC of care discussions ongoing.    SICU RD f/u: Pt noted with diarrhea 8/15, tube feeds changed from Glucerna @ 55ml/hr to Vital @ 55ml/hr and metamucil added.    Source: Patient [ ]    Family [ ]     other [X ] medical record, team rounds    Diet : NPO with EN      Patient reports  [X ] other: +2BM, large, loose (8/15)     PO intake:  < 50% [ ] 50-75% [ ]   % [ ]  other : n/a     Enteral /Parenteral Nutrition: Vital 1.2 via NGT @ 55ml/hour x 24 hours to provide 1320ml formula, 1584 medardo/day, 99Gm protein/day (meets 21cal/Kg and 1.3Gm protein/Kg based on dosing wt 75.5Kg)     Current Weight: 72.1Kg (8/15), 75.5Kg (dosing 8/8)  Edema: 1+ dependent, 2+ dependent, R arm    Pertinent Medications: MEDICATIONS  (STANDING):  insulin lispro (HumaLOG) corrective regimen sliding scale   SubCutaneous every 6 hours  aspirin  chewable 81 milliGRAM(s) Oral daily  pantoprazole   Suspension 40 milliGRAM(s) Oral daily  metolazone 5 milliGRAM(s) Oral daily  potassium chloride   Solution 20 milliEquivalent(s) Oral every 12 hours  spironolactone 50 milliGRAM(s) Oral every 24 hours  enoxaparin Injectable 40 milliGRAM(s) SubCutaneous every 24 hours  furosemide   Injectable 20 milliGRAM(s) IV Push every 12 hours  metoprolol 50 milliGRAM(s) Oral every 8 hours  psyllium Powder 1 Packet(s) Oral every 8 hours  magnesium sulfate  IVPB 2 Gram(s) IV Intermittent once  potassium chloride   Solution 20 milliEquivalent(s) Oral once    MEDICATIONS  (PRN):    Pertinent Labs:  08-15 Na142 mmol/L Glu 125 mg/dL<H> K+ 3.8 mmol/L Cr  1.62 mg/dL<H> BUN 64 mg/dL<H> Phos 3.2 mg/dL   Fingersticks x 24 hours: 115-174mg/dL    Skin: no pressure injuries    Estimated Needs:   [X ] no change since previous assessment  [ ] recalculated:       Previous Nutrition Diagnosis:     [X ] Inadequate Protein-Energy Intake     Nutrition Diagnosis is [ ] ongoing  [ X] resolving ; pt meeting estimated nutrition needs vie EN    New Nutrition Diagnosis: [X ] not applicable     Interventions:     Recommend    [ ] Change Diet To:    [ ] Nutrition Supplement    [ X] Nutrition Support; continue EN as above    [ X] Other: RD will remain available to honor pt/ family wishes regarding plan of care.        Monitoring and Evaluation:     [ ] PO intake [ ] Tolerance to diet prescription [X ] weights [X ] follow up per protocol    [ X] other: monitor EN provision and tolerance and nutritionally relevant labs

## 2017-08-15 NOTE — PROGRESS NOTE ADULT - SUBJECTIVE AND OBJECTIVE BOX
INTERVAL HPI/OVERNIGHT EVENTS: Resting comfortably in bed    MEDICATIONS  (STANDING):  insulin lispro (HumaLOG) corrective regimen sliding scale   SubCutaneous every 6 hours  aspirin  chewable 81 milliGRAM(s) Oral daily  pantoprazole   Suspension 40 milliGRAM(s) Oral daily  metolazone 5 milliGRAM(s) Oral daily  potassium chloride   Solution 20 milliEquivalent(s) Oral every 12 hours  spironolactone 50 milliGRAM(s) Oral every 24 hours  enoxaparin Injectable 40 milliGRAM(s) SubCutaneous every 24 hours  furosemide   Injectable 20 milliGRAM(s) IV Push every 12 hours  metoprolol 50 milliGRAM(s) Oral every 8 hours  psyllium Powder 1 Packet(s) Oral every 8 hours    MEDICATIONS  (PRN):      Allergies    No Known Allergies    Intolerances        General: Vital Signs Last 24 Hrs  T(C): 37 (15 Aug 2017 16:00), Max: 37 (15 Aug 2017 08:00)  T(F): 98.6 (15 Aug 2017 16:00), Max: 98.6 (15 Aug 2017 08:00)  HR: 71 (15 Aug 2017 16:13) (64 - 92)  BP: 145/63 (15 Aug 2017 16:00) (119/61 - 149/67)  BP(mean): 91 (15 Aug 2017 16:00) (81 - 104)  RR: 19 (15 Aug 2017 16:00) (17 - 41)  SpO2: 100% (15 Aug 2017 16:13) (96% - 100%)      Physical Exam:  Constitutional: well developed, well nourished, no deformities and no acute distress  Neurological: Alert & Oriented x 3, PIZARRO, no focal deficits  HEENT: NC/AT, PERRLA, EOMI,  Neck supple.  Respiratory: CTA B/L, No wheezing/crackles/rhonchi  Cardiovascular: (+) S1 & S2, RRR, No m/r/g  Gastrointestinal:  NG tube in place. soft, NT, nondistended, (+) BS  Extremities: No pedal edema, No clubbing, No cyanosis  Skin:  normal skin color and pigmentation, no skin lesions      LABS:                        10.1   14.1  )-----------( 211      ( 15 Aug 2017 01:13 )             29.4     08-15    142  |  100  |  64<H>  ----------------------------<  125<H>  3.8   |  29  |  1.62<H>    Ca    8.9      15 Aug 2017 13:46  Phos  3.2     08-15  Mg     2.2     08-15    TPro  6.1  /  Alb  2.8<L>  /  TBili  2.1<H>  /  DBili  1.2<H>  /  AST  24  /  ALT  21  /  AlkPhos  112  08-15    PT/INR - ( 15 Aug 2017 01:13 )   PT: 15.5 sec;   INR: 1.41 ratio         PTT - ( 15 Aug 2017 01:13 )  PTT:32.5 sec      RADIOLOGY & ADDITIONAL TESTS:    TELE: AFIB with PVC

## 2017-08-15 NOTE — PROGRESS NOTE ADULT - ASSESSMENT
84 y F s/p fall w/ bleeding 10.cm liver cyst, s/p IR embolization on 08/08/17  -f/u neuro consult and recs; neuro recommends MRI without contrast, MRA head and neck, speech therapy, and monitor/correct K+ and BUN.  -BIPAP PRN for hx resp failure  -continue dvt ppx, trend h/h daily  -PT/rehab planning  -discuss goals of care w/ family given poor mental status  - no surgical intervention at this time  - f/u palliative care recs

## 2017-08-15 NOTE — PROGRESS NOTE ADULT - SUBJECTIVE AND OBJECTIVE BOX
INTERVAL HISTORY:    TELEMETRY:  	  MEDICATIONS:  metolazone 5 milliGRAM(s) Oral daily  spironolactone 50 milliGRAM(s) Oral every 24 hours  furosemide   Injectable 20 milliGRAM(s) IV Push every 12 hours  metoprolol 50 milliGRAM(s) Oral every 8 hours        PHYSICAL EXAM:  T(C): 36.9 (08-15-17 @ 12:00), Max: 37 (08-15-17 @ 08:00)  HR: 75 (08-15-17 @ 15:00) (64 - 92)  BP: 136/61 (08-15-17 @ 15:00) (119/61 - 149/67)  RR: 21 (08-15-17 @ 15:00) (17 - 41)  SpO2: 100% (08-15-17 @ 15:00) (96% - 100%)  Wt(kg): --  I&O's Summary    14 Aug 2017 07:01  -  15 Aug 2017 07:00  --------------------------------------------------------  IN: 1056.6 mL / OUT: 2699 mL / NET: -1642.4 mL    15 Aug 2017 07:01  -  15 Aug 2017 15:12  --------------------------------------------------------  IN: 500 mL / OUT: 627 mL / NET: -127 mL          Appearance: Normal	  HEENT:    PERRL, EOMI	  Lymphatic: No lymphadenopathy  Cardiovascular: Normal S1 S2, No JVD  Respiratory: Lungs clear to auscultation	  Psychiatry: Alert, Mood & affect appropriate  Gastrointestinal:  Soft, Non-tender, + BS	  Skin: No rashes, No cyanosis  Neurologic: Non-focal  Extremities:  No  cyanosis or edema  Vascular: Peripheral pulses palpable  bilaterally      CARDIAC MARKERS:                                  10.1   14.1  )-----------( 211      ( 15 Aug 2017 01:13 )             29.4     08-15    142  |  100  |  64<H>  ----------------------------<  125<H>  3.8   |  29  |  1.62<H>    Ca    8.9      15 Aug 2017 13:46  Phos  3.2     08-15  Mg     2.2     08-15    TPro  6.1  /  Alb  2.8<L>  /  TBili  2.1<H>  /  DBili  1.2<H>  /  AST  24  /  ALT  21  /  AlkPhos  112  08-15    proBNP:   Lipid Profile:   HgA1c:   TSH:     ASSESSMENT/PLAN: 	          Patel Amaya DO [facility]  Cardiovascular Medicine  [phone] INTERVAL HISTORY: more awake    TELEMETRY: no further events reported  	  MEDICATIONS:  metolazone 5 milliGRAM(s) Oral daily  spironolactone 50 milliGRAM(s) Oral every 24 hours  furosemide   Injectable 20 milliGRAM(s) IV Push every 12 hours  metoprolol 50 milliGRAM(s) Oral every 8 hours        PHYSICAL EXAM:  T(C): 36.9 (08-15-17 @ 12:00), Max: 37 (08-15-17 @ 08:00)  HR: 75 (08-15-17 @ 15:00) (64 - 92)  BP: 136/61 (08-15-17 @ 15:00) (119/61 - 149/67)  RR: 21 (08-15-17 @ 15:00) (17 - 41)  SpO2: 100% (08-15-17 @ 15:00) (96% - 100%)  Wt(kg): --  I&O's Summary    14 Aug 2017 07:01  -  15 Aug 2017 07:00  --------------------------------------------------------  IN: 1056.6 mL / OUT: 2699 mL / NET: -1642.4 mL    15 Aug 2017 07:01  -  15 Aug 2017 15:12  --------------------------------------------------------  IN: 500 mL / OUT: 627 mL / NET: -127 mL          Appearance: Normal	  HEENT:    PERRL, EOMI	  Lymphatic: No lymphadenopathy  Cardiovascular: Normal S1 S2, No JVD  Respiratory: Lungs clear to auscultation	  Psychiatry: Alert, Mood & affect appropriate  Gastrointestinal:  Soft, Non-tender, + BS	  Skin: No rashes, No cyanosis  Neurologic: Non-focal  Extremities:  No  cyanosis or edema  Vascular: Peripheral pulses palpable  bilaterally      CARDIAC MARKERS:                                  10.1   14.1  )-----------( 211      ( 15 Aug 2017 01:13 )             29.4     08-15    142  |  100  |  64<H>  ----------------------------<  125<H>  3.8   |  29  |  1.62<H>    Ca    8.9      15 Aug 2017 13:46  Phos  3.2     08-15  Mg     2.2     08-15    TPro  6.1  /  Alb  2.8<L>  /  TBili  2.1<H>  /  DBili  1.2<H>  /  AST  24  /  ALT  21  /  AlkPhos  112  08-15        ASSESSMENT/PLAN: 	  84F w/hx of Afib on Xarelto, MitraClip, sHF, presented to ED w/SOB and abdominal pain after multiple falls. Found to have hemorrhagic hepatic lesion with hemorrhagic shock now Hospital Day 7 Post-procedure Date 6 s/p IR embolization of hemorrhagic hepatic lesion (08/08/17)  Patient remains nonverbal (since admission) but follows commands (squeezes fingers, moves legs).  She is tolerating minesh-tube feeds, and had a bm yesterday, 08/13/17.  Head CT performed two days ago (08/12/17) shows no signs of new infarctions/bleeds. I was consulted for VT earlier today with ICD shock. She is off AC given life threatening bleed.     1. VT in the setting of significant hypokalemia yesterday s/p ICD shock. No further episodes  - EP consult appreciated      2. AF - rate control with Metoprolol, 50mg q8. Off AC as life threatening bleed recently     3. sHF- diurese to keep net negative. Agree with Lasix and Metolazone for now and titrate to urine output and kidney function  - Continue with Aldactone for both sHF and hypokalemia. Monitor ins/outs and kidney function  - continue with Metoprolol, ASA    4. HTN - Well controlled on current regimen    5. Neuro - pt nonverbal still. Unclear Etiology. Neuro consult noted, MRI pending          Patel Amaya DO [facility]  Cardiovascular Medicine  [phone]

## 2017-08-15 NOTE — PROGRESS NOTE ADULT - SUBJECTIVE AND OBJECTIVE BOX
84 year old female s/p fall with bleeding 10.2 cm liver cyst s/p IR embolization complicated by altered mental status. CT Head was unchanged from prior exam.  Secondary to increased work of breathing she was started on nocturnal bipap.     24 Hours: Yesterday morning pt had a run of V-tach and had automatic firing of her AICD. At this time she was Amio loaded and then was briefly on an Amio gtt. However Cardiology was consulted and recommended instead uptitirating her Lopressor to 50 mg q8h. She had a duplex done of her RUE which showed no DVT. She was started on spironolactone. 84 year old female s/p fall with bleeding 10.2 cm liver cyst s/p IR embolization complicated by altered mental status. CT Head was unchanged from prior exam.  Secondary to increased work of breathing she was started on nocturnal bipap.     24 Hours: Yesterday morning pt had a run of V-tach and had automatic firing of her AICD. At this time she was Amio loaded and then was briefly on an Amio gtt. However Cardiology was consulted and recommended instead uptitirating her Lopressor to 50 mg q8h. She had a duplex done of her RUE which showed no DVT. She was started on spironolactone.       SUBJECTIVE/ROS:  [ ] A ten-point review of systems was otherwise negative except as noted.  [x ] Due to altered mental status/intubation, subjective information were not able to be obtained from the patient. History was obtained, to the extent possible, from review of the chart and collateral sources of information.      NEURO  Exam: AOx1, responds to voice but does not answer questions  Meds: None  [x] Adequacy of sedation and pain control has been assessed and adjusted      RESPIRATORY  RR: 19 (08-15-17 @ 07:00) (17 - 41)  SpO2: 100% (08-15-17 @ 07:00) (96% - 100%)  Wt(kg): --  Exam: unlabored, clear to auscultation bilaterally  Mechanical Ventilation:   ABG - ( 15 Aug 2017 01:16 )  pH: 7.50  /  pCO2: 39    /  pO2: 106   / HCO3: 30    / Base Excess: 6.2   /  SaO2: 99        CARDIOVASCULAR  HR: 73 (08-15-17 @ 07:00) (69 - 92)  BP: 144/75 (08-15-17 @ 07:00) (120/61 - 145/64)  BP(mean): 104 (08-15-17 @ 07:00) (81 - 104)  Exam: rrr  Cardiac Rhythm: sinus  Perfusion     [ x]Adequate   [ ]Inadequate  Mentation   [ ]Normal       [x ]Reduced  Extremities  [ ]Warm         [ x]Cool  Volume Status [x ]Hypervolemic [ ]Euvolemic [ ]Hypovolemic  Meds: metolazone 5 milliGRAM(s) Oral daily  spironolactone 50 milliGRAM(s) Oral every 24 hours  furosemide   Injectable 20 milliGRAM(s) IV Push every 12 hours  metoprolol 50 milliGRAM(s) Oral every 8 hours        GI/NUTRITION  Exam: softly distended, nontender  Diet: NPO / TF  Meds: pantoprazole   Suspension 40 milliGRAM(s) Oral daily      GENITOURINARY  I&O's Detail    08-14 @ 07:01  -  08-15 @ 07:00  --------------------------------------------------------  IN:    amiodarone Infusion: 66.6 mL    Enteral Tube Flush: 260 mL    Glucerna: 380 mL    IV PiggyBack: 350 mL  Total IN: 1056.6 mL    OUT:    Indwelling Catheter - Urethral: 2695 mL    Stool: 4 mL  Total OUT: 2699 mL    Total NET: -1642.4 mL    08-15    142  |  99  |  63<H>  ----------------------------<  153<H>  3.8   |  28  |  1.54<H>    Ca    8.8      15 Aug 2017 01:13  Phos  3.7     08-15  Mg     2.2     08-15    TPro  6.1  /  Alb  2.5<L>  /  TBili  2.4<H>  /  DBili  x   /  AST  21  /  ALT  21  /  AlkPhos  101  08-14    [ ] Reyez catheter, indication: N/A  Meds: potassium chloride   Solution 20 milliEquivalent(s) Oral every 12 hours        HEMATOLOGIC  Meds: aspirin  chewable 81 milliGRAM(s) Oral daily  enoxaparin Injectable 40 milliGRAM(s) SubCutaneous every 24 hours    [x] VTE Prophylaxis                        10.1   14.1  )-----------( 211      ( 15 Aug 2017 01:13 )             29.4     PT/INR - ( 15 Aug 2017 01:13 )   PT: 15.5 sec;   INR: 1.41 ratio         PTT - ( 15 Aug 2017 01:13 )  PTT:32.5 sec  Transfusion     [ ] PRBC   [ ] Platelets   [ ] FFP   [ ] Cryoprecipitate      INFECTIOUS DISEASES  T(C): 36.8 (08-15-17 @ 06:00), Max: 37 (08-14-17 @ 15:00)  Wt(kg): --  WBC Count: 14.1 K/uL (08-15 @ 01:13)    ENDOCRINE  Capillary Blood Glucose  156 (15 Aug 2017 06:00)  147 (15 Aug 2017 00:00)  115 (14 Aug 2017 18:00)  174 (14 Aug 2017 12:00)    Meds: insulin lispro (HumaLOG) corrective regimen sliding scale   SubCutaneous every 6 hours    ACCESS DEVICES:  [x ] Peripheral IV  [ ] Central Venous Line	[ ] R	[ ] L	[ ] IJ	[ ] Fem	[ ] SC	Placed:   [ ] Arterial Line		[ ] R	[ ] L	[ ] Fem	[ ] Rad	[ ] Ax	Placed:   [ ] PICC:					[ ] Mediport  [ ] Urinary Catheter, Date Placed:   [ ] Necessity of urinary, arterial, and venous catheters discussed    OTHER MEDICATIONS:      CODE STATUS: Full     IMAGING: CXR pending

## 2017-08-15 NOTE — PROGRESS NOTE ADULT - ASSESSMENT
84 year old female s/p fall with bleeding 10.2 cm liver cyst s/p IR embolization complicated by altered mental status. CT Head was unchanged from prior exam.     Neuro: post-procedure pain control  - IV acetaminophen prn    Resp: acute respiratory failure, now extubated  - BIPAP at night to ease work of breathing and help O2 saturation  - Monitor pulse oximeter.  - Monitor CXR, assess for pulmonary edema.  - Pulmonary toileting, incentive spirometry, and out of bed to chair to prevent atelectasis    CV: Afib with RVR on Xarelto, CHF, CAD s/p CABG, MVR s/p clipping, PPM, HTN, HLD  - Monitor vital signs.  - Metoprolol increased secondary to V-tach on 8/14  - Monitor volume status carefully    GI: h/o GERD, enteritis on CT  - NPO with tube feeds. Feeds held at night when patient receives BIPAP  - Protonix for stress ulcer prophylaxis     /Renal: CKD stage 3 with MYA  - Strict I's/O's  - Continue to titrate down with amount of Lasix and increase Spironolactone.   - Monitor electrolytes and replete as necessary    Heme: hemorrhagic shock s/p IR embolization liver cyst  - Lovenox for VTE prophylaxis, ASA 81 mg  - Monitor CBC and coags     ID: afebrile  - Monitor for clinical evidence of active infection    Endo: h/o DM  - Monitor fingersticks and ISS for glycemic control    Disposition: full code, will remain in SICU for monitoring  Will need GOC conversation    LIZZETTE Chong, PGY 2 [id number]

## 2017-08-15 NOTE — PROGRESS NOTE ADULT - SUBJECTIVE AND OBJECTIVE BOX
STATUS POST:  n/a      POST OPERATIVE DAY #: n/a  HD#8    Vital Signs Last 24 Hrs  T(C): 37 (15 Aug 2017 08:00), Max: 37 (14 Aug 2017 15:00)  T(F): 98.6 (15 Aug 2017 08:00), Max: 98.6 (14 Aug 2017 15:00)  HR: 67 (15 Aug 2017 09:00) (64 - 92)  BP: 119/61 (15 Aug 2017 09:00) (119/61 - 145/64)  BP(mean): 84 (15 Aug 2017 09:00) (81 - 104)  RR: 17 (15 Aug 2017 09:00) (17 - 41)  SpO2: 100% (15 Aug 2017 09:00) (96% - 100%)    I&O's Summary    14 Aug 2017 07:01  -  15 Aug 2017 07:00  --------------------------------------------------------  IN: 1056.6 mL / OUT: 2699 mL / NET: -1642.4 mL    15 Aug 2017 07:01  -  15 Aug 2017 10:12  --------------------------------------------------------  IN: 125 mL / OUT: 395 mL / NET: -270 mL        SUBJECTIVE: Pt seen at bedside. Currently AVSS. Patient had an episode of Vtach to 224bpm which triggered her defibrillator. She received amiodarone and is currently on a drip. She remains nonverbal       Physical Exam:  General Appearance: nonverbal, in NAD  Neck: Supple  Chest: Equal expansion bilaterally, equal breath sounds  CV: Pulse regular presently  Abdomen: Soft, nontense, ND, jtube in place  Extremities: Grossly symmetric, SCD's in place     LABS:                        10.1   14.1  )-----------( 211      ( 15 Aug 2017 01:13 )             29.4     08-15    142  |  99  |  63<H>  ----------------------------<  153<H>  3.8   |  28  |  1.54<H>    Ca    8.8      15 Aug 2017 01:13  Phos  3.7     08-15  Mg     2.2     08-15    TPro  6.1  /  Alb  2.5<L>  /  TBili  2.4<H>  /  DBili  x   /  AST  21  /  ALT  21  /  AlkPhos  101  08-14    PT/INR - ( 15 Aug 2017 01:13 )   PT: 15.5 sec;   INR: 1.41 ratio         PTT - ( 15 Aug 2017 01:13 )  PTT:32.5 sec      RADIOLOGY & ADDITIONAL STUDIES:

## 2017-08-15 NOTE — PROGRESS NOTE ADULT - PROBLEM SELECTOR PLAN 1
Telemetry currently reveals  AFIB with PVC. No further runs of VT noted.   Continue with telemetry monitoring for arrhythmia  Titrate upward beta- blocker  Monitor BMP, Maintain Mag level 2.0 and K+ level 4.0

## 2017-08-16 DIAGNOSIS — I50.23 ACUTE ON CHRONIC SYSTOLIC (CONGESTIVE) HEART FAILURE: ICD-10-CM

## 2017-08-16 LAB
ALBUMIN SERPL ELPH-MCNC: 2.5 G/DL — LOW (ref 3.3–5)
ALP SERPL-CCNC: 99 U/L — SIGNIFICANT CHANGE UP (ref 40–120)
ALT FLD-CCNC: 17 U/L RC — SIGNIFICANT CHANGE UP (ref 10–45)
ANION GAP SERPL CALC-SCNC: 15 MMOL/L — SIGNIFICANT CHANGE UP (ref 5–17)
APTT BLD: 21.8 SEC — LOW (ref 27.5–37.4)
AST SERPL-CCNC: 20 U/L — SIGNIFICANT CHANGE UP (ref 10–40)
BILIRUB DIRECT SERPL-MCNC: 1 MG/DL — HIGH (ref 0–0.2)
BILIRUB INDIRECT FLD-MCNC: 0.7 MG/DL — SIGNIFICANT CHANGE UP (ref 0.2–1)
BILIRUB SERPL-MCNC: 1.7 MG/DL — HIGH (ref 0.2–1.2)
BUN SERPL-MCNC: 66 MG/DL — HIGH (ref 7–23)
CALCIUM SERPL-MCNC: 8.9 MG/DL — SIGNIFICANT CHANGE UP (ref 8.4–10.5)
CHLORIDE SERPL-SCNC: 99 MMOL/L — SIGNIFICANT CHANGE UP (ref 96–108)
CO2 SERPL-SCNC: 27 MMOL/L — SIGNIFICANT CHANGE UP (ref 22–31)
CREAT SERPL-MCNC: 1.45 MG/DL — HIGH (ref 0.5–1.3)
GLUCOSE SERPL-MCNC: 154 MG/DL — HIGH (ref 70–99)
HCT VFR BLD CALC: 30.8 % — LOW (ref 34.5–45)
HGB BLD-MCNC: 10 G/DL — LOW (ref 11.5–15.5)
INR BLD: 1.25 RATIO — HIGH (ref 0.88–1.16)
MAGNESIUM SERPL-MCNC: 2.4 MG/DL — SIGNIFICANT CHANGE UP (ref 1.6–2.6)
MCHC RBC-ENTMCNC: 29.2 PG — SIGNIFICANT CHANGE UP (ref 27–34)
MCHC RBC-ENTMCNC: 32.3 GM/DL — SIGNIFICANT CHANGE UP (ref 32–36)
MCV RBC AUTO: 90.3 FL — SIGNIFICANT CHANGE UP (ref 80–100)
PHOSPHATE SERPL-MCNC: 3.4 MG/DL — SIGNIFICANT CHANGE UP (ref 2.5–4.5)
PLATELET # BLD AUTO: 212 K/UL — SIGNIFICANT CHANGE UP (ref 150–400)
POTASSIUM SERPL-MCNC: 3.9 MMOL/L — SIGNIFICANT CHANGE UP (ref 3.5–5.3)
POTASSIUM SERPL-SCNC: 3.9 MMOL/L — SIGNIFICANT CHANGE UP (ref 3.5–5.3)
PROT SERPL-MCNC: 6 G/DL — SIGNIFICANT CHANGE UP (ref 6–8.3)
PROTHROM AB SERPL-ACNC: 13.7 SEC — HIGH (ref 9.8–12.7)
RBC # BLD: 3.42 M/UL — LOW (ref 3.8–5.2)
RBC # FLD: 15 % — HIGH (ref 10.3–14.5)
SODIUM SERPL-SCNC: 141 MMOL/L — SIGNIFICANT CHANGE UP (ref 135–145)
WBC # BLD: 15 K/UL — HIGH (ref 3.8–10.5)
WBC # FLD AUTO: 15 K/UL — HIGH (ref 3.8–10.5)

## 2017-08-16 PROCEDURE — 99233 SBSQ HOSP IP/OBS HIGH 50: CPT

## 2017-08-16 PROCEDURE — 71010: CPT | Mod: 26

## 2017-08-16 PROCEDURE — 93010 ELECTROCARDIOGRAM REPORT: CPT

## 2017-08-16 RX ORDER — METOPROLOL TARTRATE 50 MG
100 TABLET ORAL EVERY 12 HOURS
Qty: 0 | Refills: 0 | Status: DISCONTINUED | OUTPATIENT
Start: 2017-08-16 | End: 2017-08-21

## 2017-08-16 RX ORDER — ACETAMINOPHEN 500 MG
1000 TABLET ORAL ONCE
Qty: 0 | Refills: 0 | Status: COMPLETED | OUTPATIENT
Start: 2017-08-16 | End: 2017-08-16

## 2017-08-16 RX ORDER — FUROSEMIDE 40 MG
40 TABLET ORAL EVERY 12 HOURS
Qty: 0 | Refills: 0 | Status: DISCONTINUED | OUTPATIENT
Start: 2017-08-16 | End: 2017-08-21

## 2017-08-16 RX ORDER — POTASSIUM CHLORIDE 20 MEQ
20 PACKET (EA) ORAL ONCE
Qty: 0 | Refills: 0 | Status: DISCONTINUED | OUTPATIENT
Start: 2017-08-16 | End: 2017-08-16

## 2017-08-16 RX ADMIN — Medication 2: at 06:13

## 2017-08-16 RX ADMIN — Medication 2: at 17:46

## 2017-08-16 RX ADMIN — Medication 1000 MILLIGRAM(S): at 05:25

## 2017-08-16 RX ADMIN — Medication 20 MILLIEQUIVALENT(S): at 05:07

## 2017-08-16 RX ADMIN — Medication 20 MILLIEQUIVALENT(S): at 17:38

## 2017-08-16 RX ADMIN — ENOXAPARIN SODIUM 40 MILLIGRAM(S): 100 INJECTION SUBCUTANEOUS at 17:39

## 2017-08-16 RX ADMIN — Medication 40 MILLIGRAM(S): at 17:38

## 2017-08-16 RX ADMIN — Medication 400 MILLIGRAM(S): at 05:07

## 2017-08-16 RX ADMIN — Medication 1000 MILLIGRAM(S): at 17:05

## 2017-08-16 RX ADMIN — Medication 1 PACKET(S): at 05:07

## 2017-08-16 RX ADMIN — Medication 1 PACKET(S): at 13:24

## 2017-08-16 RX ADMIN — Medication 1 PACKET(S): at 21:02

## 2017-08-16 RX ADMIN — Medication 2 MILLIGRAM(S): at 21:02

## 2017-08-16 RX ADMIN — Medication 100 MILLIGRAM(S): at 17:39

## 2017-08-16 RX ADMIN — Medication 400 MILLIGRAM(S): at 16:35

## 2017-08-16 RX ADMIN — Medication 50 MILLIGRAM(S): at 05:07

## 2017-08-16 RX ADMIN — PANTOPRAZOLE SODIUM 40 MILLIGRAM(S): 20 TABLET, DELAYED RELEASE ORAL at 11:26

## 2017-08-16 RX ADMIN — SPIRONOLACTONE 50 MILLIGRAM(S): 25 TABLET, FILM COATED ORAL at 05:07

## 2017-08-16 RX ADMIN — Medication 2: at 11:09

## 2017-08-16 RX ADMIN — Medication 20 MILLIGRAM(S): at 05:08

## 2017-08-16 RX ADMIN — Medication 81 MILLIGRAM(S): at 11:10

## 2017-08-16 NOTE — PROGRESS NOTE ADULT - ASSESSMENT
This is a 84 year old female with PMH Afib (was on Xarelto), CAD, s/p mitral clip, CABG x 3 (7/1/2013), Aflutter, HF, EF 35-40%, s/p single chamber ICD (BSC) on 5/23/17 for inducible sustained monomorphic VT, breast CA s/p resection/chemo, HTN, HLD, DM2, p/w SOB and abdominal pain, found to have large intraperitoneal bleed s/p IR embolization of hemorrhagic hepatic lesion on 8/8/17. Course c/b s/p ICD shock on 8/14 for monomorphic VT in the setting of hypokalemia.

## 2017-08-16 NOTE — PROGRESS NOTE ADULT - SUBJECTIVE AND OBJECTIVE BOX
GENERAL SURGERY DAILY PROGRESS NOTE:     Subjective: No over night complaints, patient resting comfortably when team awoke for AM rounds.     MEDICATIONS  (STANDING):  insulin lispro (HumaLOG) corrective regimen sliding scale   SubCutaneous every 6 hours  aspirin  chewable 81 milliGRAM(s) Oral daily  pantoprazole   Suspension 40 milliGRAM(s) Oral daily  metolazone 5 milliGRAM(s) Oral daily  potassium chloride   Solution 20 milliEquivalent(s) Oral every 12 hours  spironolactone 50 milliGRAM(s) Oral every 24 hours  enoxaparin Injectable 40 milliGRAM(s) SubCutaneous every 24 hours  furosemide   Injectable 20 milliGRAM(s) IV Push every 12 hours  metoprolol 50 milliGRAM(s) Oral every 8 hours  psyllium Powder 1 Packet(s) Oral every 8 hours  MEDICATIONS  (PRN):  loperamide Solution 2 milliGRAM(s) Oral every 1 hour PRN Diarrhea    Vital Signs Last 24 Hrs:  T(C): 37.1 (16 Aug 2017 07:00), Max: 37.1 (16 Aug 2017 07:00)  T(F): 98.7 (16 Aug 2017 07:00), Max: 98.7 (16 Aug 2017 07:00)  HR: 73 (16 Aug 2017 09:00) (66 - 85)  BP: 132/66 (16 Aug 2017 09:00) (120/62 - 166/89)  BP(mean): 95 (16 Aug 2017 09:00) (82 - 120)  RR: 40 (16 Aug 2017 09:00) (16 - 40)  SpO2: 98% (16 Aug 2017 09:00) (96% - 100%)    I&O's Detail    15 Aug 2017 07:01  -  16 Aug 2017 07:00  --------------------------------------------------------  IN:    Enteral Tube Flush: 350 mL    Glucerna: 290 mL    IV PiggyBack: 300 mL    Vital HN: 770 mL  Total IN: 1710 mL    OUT:    Indwelling Catheter - Urethral: 2755 mL    Stool: 9 mL  Total OUT: 2764 mL    Total NET: -1054 mL  16 Aug 2017 07:01  -  16 Aug 2017 09:04  --------------------------------------------------------  IN:    IV PiggyBack: 50 mL    Vital HN: 110 mL  Total IN: 160 mL    OUT:    Indwelling Catheter - Urethral: 150 mL  Total OUT: 150 mL    Total NET: 10 mL    Daily     Daily Weight in k.1 (16 Aug 2017 02:37)    LABS:                     10.0   15.0  )-----------( 212      ( 16 Aug 2017 02:56 )             30.8   08-16  141  |  99  |  66<H>  ----------------------------<  154<H>  3.9   |  27  |  1.45<H>    Ca    8.9      16 Aug 2017 02:56  Phos  3.4     08-16  Mg     2.4     08-16  TPro  6.0  /  Alb  2.5<L>  /  TBili  1.7<H>  /  DBili  1.0<H>  /  AST  20  /  ALT  17  /  AlkPhos  99  08-16  PT/INR - ( 16 Aug 2017 02:56 )   PT: 13.7 sec;   INR: 1.25 ratio     PTT - ( 16 Aug 2017 02:56 )  PTT:21.8 sec    Physical Exam:  Gen: NAD, AAOx3   Abdomen: Soft, NT, ND

## 2017-08-16 NOTE — PROGRESS NOTE ADULT - SUBJECTIVE AND OBJECTIVE BOX
Neurology Follow-up    Name  ELY WOOTEN    Patient seen and examined. Stable per nursing, no family at bedside.                                                          MEDICATIONS  (STANDING):  insulin lispro (HumaLOG) corrective regimen sliding scale   SubCutaneous every 6 hours  aspirin  chewable 81 milliGRAM(s) Oral daily  pantoprazole   Suspension 40 milliGRAM(s) Oral daily  metolazone 5 milliGRAM(s) Oral daily  potassium chloride   Solution 20 milliEquivalent(s) Oral every 12 hours  spironolactone 50 milliGRAM(s) Oral every 24 hours  enoxaparin Injectable 40 milliGRAM(s) SubCutaneous every 24 hours  furosemide   Injectable 20 milliGRAM(s) IV Push every 12 hours  metoprolol 50 milliGRAM(s) Oral every 8 hours  psyllium Powder 1 Packet(s) Oral every 8 hours    MEDICATIONS  (PRN):  loperamide Solution 2 milliGRAM(s) Oral every 1 hour PRN Diarrhea      Allergies    No Known Allergies    Intolerances        Objective  Vital Signs Last 24 Hrs  T(C): 37.1 (16 Aug 2017 07:00), Max: 37.1 (16 Aug 2017 07:00)  T(F): 98.7 (16 Aug 2017 07:00), Max: 98.7 (16 Aug 2017 07:00)  HR: 73 (16 Aug 2017 09:00) (66 - 85)  BP: 132/66 (16 Aug 2017 09:00) (120/62 - 166/89)  BP(mean): 95 (16 Aug 2017 09:00) (82 - 120)  RR: 40 (16 Aug 2017 09:00) (16 - 40)  SpO2: 98% (16 Aug 2017 09:00) (96% - 100%)    General Exam   General appearance: No acute distress, well-nourished, right UE maintained in flexed position  Respiratory:    non-labored respirations               Neurological Exam  Mental Status:  asleep, grimaces to noxious stimuli but offers no verbal output    Cranial Nerves: PERRL, no gaze preference, corneal reflex intact, no facial droop    Motor:   Tone:   normal               Strength: increased tone right worse than left, small spontaneous movements, no kesha hemiplegia     Tremor:  none appreciated at rest    Reflexes: brisk throughout    Other Studies    08-16    141  |  99  |  66<H>  ----------------------------<  154<H>  3.9   |  27  |  1.45<H>    Ca    8.9      16 Aug 2017 02:56  Phos  3.4     08-16  Mg     2.4     08-16    TPro  6.0  /  Alb  2.5<L>  /  TBili  1.7<H>  /  DBili  1.0<H>  /  AST  20  /  ALT  17  /  AlkPhos  99  08-16    08-16    141  |  99  |  66<H>  ----------------------------<  154<H>  3.9   |  27  |  1.45<H>    Ca    8.9      16 Aug 2017 02:56  Phos  3.4     08-16  Mg     2.4     08-16    TPro  6.0  /  Alb  2.5<L>  /  TBili  1.7<H>  /  DBili  1.0<H>  /  AST  20  /  ALT  17  /  AlkPhos  99  08-16    LIVER FUNCTIONS - ( 16 Aug 2017 02:56 )  Alb: 2.5 g/dL / Pro: 6.0 g/dL / ALK PHOS: 99 U/L / ALT: 17 U/L RC / AST: 20 U/L / GGT: x

## 2017-08-16 NOTE — PROGRESS NOTE ADULT - PROBLEM SELECTOR PLAN 3
Continue IV diuresis with Lasix  Follow up Cxray  Monitor and replete electrolyes    TWarren/ L Domoniqueore NP  [id number]

## 2017-08-16 NOTE — PROGRESS NOTE ADULT - ASSESSMENT
84 year old female with afib on xarelto, DMII, HTN admitted with liver hemorrhage and altered mental status post 2 falls with slight decline in responsiveness today. Imaging revealed multiple infarct in multiple distributions. AMS possibly s/t new CVA.   Patient unable to get MRI because she has an AICD.

## 2017-08-16 NOTE — PROGRESS NOTE ADULT - ASSESSMENT
84 year old female s/p fall with bleeding 10.2 cm liver cyst s/p IR embolization complicated by altered mental status. CT Head was unchanged from prior exam.     Neuro: post-procedure pain control  - none    Resp: acute respiratory failure, now extubated  - BIPAP at night to ease work of breathing and help O2 saturation  - Monitor pulse oximeter.  - Monitor CXR, assess for pulmonary edema.  - Pulmonary toileting, incentive spirometry, and out of bed to chair to prevent atelectasis    CV: Afib with RVR on Xarelto, CHF, CAD s/p CABG, MVR s/p clipping, PPM, HTN, HLD  - Monitor vital signs.  - Metoprolol increased secondary to V-tach on 8/14  - Monitor volume status carefully    GI: h/o GERD, enteritis on CT  - NPO with tube feeds. Feeds held at night when patient receives BIPAP  - Protonix for stress ulcer prophylaxis     /Renal: CKD stage 3 with MYA  - Strict I's/O's  - Continue to titrate down with amount of Lasix and increase Spironolactone.   - Monitor electrolytes and replete as necessary    Heme: hemorrhagic shock s/p IR embolization liver cyst  - Lovenox for VTE prophylaxis, ASA 81 mg  - Monitor CBC and coags     ID: afebrile  - Monitor for clinical evidence of active infection    Endo: h/o DM  - Monitor fingersticks and ISS for glycemic control    Disposition: full code, will remain in SICU for monitoring  Will need GOC conversation

## 2017-08-16 NOTE — PROGRESS NOTE ADULT - PROBLEM SELECTOR PLAN 1
Currently Afib; rate controlled with episodes of NSVT up to 5 beats;  May uptitrate Betablocker as BP tolerates  Maintain K+ > 4.0 and Mg++ > 2.0  Continue telemetry monitoring  This ICD model is currently not FDA approved for MRI

## 2017-08-16 NOTE — PROGRESS NOTE ADULT - SUBJECTIVE AND OBJECTIVE BOX
HISTORY  84 year old female s/p fall with bleeding 10.2 cm liver cyst s/p IR embolization complicated by altered mental status. CT Head was unchanged from prior exam.  Secondary to increased work of breathing she was started on nocturnal bipap.     24 HOUR EVENTS: Tube feeds at goal with lidocaine at 55. Pt was experiencing diarrhea and subsequently received lomotil.     SUBJECTIVE/ROS:  [ ] A ten-point review of systems was otherwise negative except as noted.  [ ] Due to altered mental status/intubation, subjective information were not able to be obtained from the patient. History was obtained, to the extent possible, from review of the chart and collateral sources of information.      NEURO  Exam: AOx1  Meds: none  [x] Adequacy of sedation and pain control has been assessed and adjusted      RESPIRATORY  RR: 18 (08-16-17 @ 02:00) (16 - 32)  SpO2: 100% (08-16-17 @ 02:00) (97% - 100%)  Exam: unlabored, clear to auscultation bilaterally  Mechanical Ventilation:   ABG - ( 15 Aug 2017 01:16 )  pH: 7.50  /  pCO2: 39    /  pO2: 106   / HCO3: 30    / Base Excess: 6.2   /  SaO2: 99        CARDIOVASCULAR  HR: 77 (08-16-17 @ 02:00) (64 - 85)  BP: 134/66 (08-16-17 @ 02:00) (119/61 - 166/89)  BP(mean): 91 (08-16-17 @ 02:00) (82 - 120    Exam: rrr  Cardiac Rhythm: sinus  Perfusion     [x ]Adequate   [ ]Inadequate  Mentation   [ ]Normal       [x ]Reduced  Extremities  [ ]Warm         [x ]Cool  Volume Status [x ]Hypervolemic [ ]Euvolemic [ ]Hypovolemic  Meds: metolazone 5 milliGRAM(s) Oral daily  spironolactone 50 milliGRAM(s) Oral every 24 hours  furosemide   Injectable 20 milliGRAM(s) IV Push every 12 hours  metoprolol 50 milliGRAM(s) Oral every 8 hours        GI/NUTRITION  Exam: softly distended, NT  Diet: NPO/TF  Meds: pantoprazole   Suspension 40 milliGRAM(s) Oral daily  psyllium Powder 1 Packet(s) Oral every 8 hours  loperamide Solution 2 milliGRAM(s) Oral every 1 hour PRN Diarrhea      GENITOURINARY  I&O's Detail    08-14 @ 07:01  -  08-15 @ 07:00  --------------------------------------------------------  IN:    amiodarone Infusion: 66.6 mL    Enteral Tube Flush: 260 mL    Glucerna: 380 mL    IV PiggyBack: 350 mL  Total IN: 1056.6 mL    OUT:    Indwelling Catheter - Urethral: 2695 mL    Stool: 4 mL  Total OUT: 2699 mL    Total NET: -1642.4 mL      08-15 @ 07:01 - 08-16 @ 02:28  --------------------------------------------------------  IN:    Enteral Tube Flush: 350 mL    Glucerna: 290 mL    IV PiggyBack: 200 mL    Vital HN: 495 mL  Total IN: 1335 mL    OUT:    Indwelling Catheter - Urethral: 1980 mL    Stool: 9 mL  Total OUT: 1989 mL    Total NET: -654 mL          08-15    142  |  100  |  64<H>  ----------------------------<  125<H>  3.8   |  29  |  1.62<H>    Ca    8.9      15 Aug 2017 13:46  Phos  3.2     08-15  Mg     2.2     08-15    TPro  6.1  /  Alb  2.8<L>  /  TBili  2.1<H>  /  DBili  1.2<H>  /  AST  24  /  ALT  21  /  AlkPhos  112  08-15    [ ] Reyez catheter, indication: N/A  Meds: potassium chloride   Solution 20 milliEquivalent(s) Oral every 12 hours        HEMATOLOGIC  Meds: aspirin  chewable 81 milliGRAM(s) Oral daily  enoxaparin Injectable 40 milliGRAM(s) SubCutaneous every 24 hours    [x] VTE Prophylaxis                        10.1   14.1  )-----------( 211      ( 15 Aug 2017 01:13 )             29.4     PT/INR - ( 15 Aug 2017 01:13 )   PT: 15.5 sec;   INR: 1.41 ratio         PTT - ( 15 Aug 2017 01:13 )  PTT:32.5 sec  Transfusion     [ ] PRBC   [ ] Platelets   [ ] FFP   [ ] Cryoprecipitate      INFECTIOUS DISEASES  T(C): 36.8 (08-15-17 @ 23:00), Max: 37 (08-15-17 @ 08:00)    ENDOCRINE  Capillary Blood Glucose  132 (16 Aug 2017 00:00)  164 (15 Aug 2017 18:00)  167 (15 Aug 2017 12:00)  156 (15 Aug 2017 06:00)    Meds: insulin lispro (HumaLOG) corrective regimen sliding scale   SubCutaneous every 6 hours    ACCESS DEVICES:  [ x] Peripheral IV  [ ] Central Venous Line	[ ] R	[ ] L	[ ] IJ	[ ] Fem	[ ] SC	Placed:   [ ] Arterial Line		[ ] R	[ ] L	[ ] Fem	[ ] Rad	[ ] Ax	Placed:   [ ] PICC:					[ ] Mediport  [ ] Urinary Catheter, Date Placed:   [ ] Necessity of urinary, arterial, and venous catheters discussed    OTHER MEDICATIONS:      CODE STATUS:  full     IMAGING:CXR pending HISTORY  84 year old female s/p fall with bleeding 10.2 cm liver cyst s/p IR embolization complicated by altered mental status. CT Head was unchanged from prior exam.  Secondary to increased work of breathing she was started on nocturnal bipap.     24 HOUR EVENTS: Tube feeds at goal with lidocaine at 55. Pt was experiencing diarrhea and subsequently received lomotil.     SUBJECTIVE/ROS:  [ ] A ten-point review of systems was otherwise negative except as noted.  [ ] Due to altered mental status/intubation, subjective information were not able to be obtained from the patient. History was obtained, to the extent possible, from review of the chart and collateral sources of information.      NEURO  Exam: AOx1  Meds: none  [x] Adequacy of sedation and pain control has been assessed and adjusted      RESPIRATORY  RR: 18 (08-16-17 @ 02:00) (16 - 32)  SpO2: 100% (08-16-17 @ 02:00) (97% - 100%)  Exam: unlabored, clear to auscultation bilaterally  Mechanical Ventilation:   ABG - ( 15 Aug 2017 01:16 )  pH: 7.50  /  pCO2: 39    /  pO2: 106   / HCO3: 30    / Base Excess: 6.2   /  SaO2: 99        CARDIOVASCULAR  HR: 77 (08-16-17 @ 02:00) (64 - 85)  BP: 134/66 (08-16-17 @ 02:00) (119/61 - 166/89)  BP(mean): 91 (08-16-17 @ 02:00) (82 - 120    Exam: rrr  Cardiac Rhythm: sinus  Perfusion     [x ]Adequate   [ ]Inadequate  Mentation   [ ]Normal       [x ]Reduced  Extremities  [ ]Warm         [x ]Cool  Volume Status [x ]Hypervolemic [ ]Euvolemic [ ]Hypovolemic  Meds: metolazone 5 milliGRAM(s) Oral daily  spironolactone 50 milliGRAM(s) Oral every 24 hours  furosemide   Injectable 20 milliGRAM(s) IV Push every 12 hours  metoprolol 50 milliGRAM(s) Oral every 8 hours        GI/NUTRITION  Exam: softly distended, NT  Diet: NPO/TF  Meds: pantoprazole   Suspension 40 milliGRAM(s) Oral daily  psyllium Powder 1 Packet(s) Oral every 8 hours  loperamide Solution 2 milliGRAM(s) Oral every 1 hour PRN Diarrhea      GENITOURINARY  I&O's Detail    08-14 @ 07:01  -  08-15 @ 07:00  --------------------------------------------------------  IN:    amiodarone Infusion: 66.6 mL    Enteral Tube Flush: 260 mL    Glucerna: 380 mL    IV PiggyBack: 350 mL  Total IN: 1056.6 mL    OUT:    Indwelling Catheter - Urethral: 2695 mL    Stool: 4 mL  Total OUT: 2699 mL    Total NET: -1642.4 mL      08-15 @ 07:01 - 08-16 @ 02:28  --------------------------------------------------------  IN:    Enteral Tube Flush: 350 mL    Glucerna: 290 mL    IV PiggyBack: 200 mL    Vital HN: 495 mL  Total IN: 1335 mL    OUT:    Indwelling Catheter - Urethral: 1980 mL    Stool: 9 mL  Total OUT: 1989 mL    Total NET: -654 mL    08-16    141  |  99  |  66<H>  ----------------------------<  154<H>  3.9   |  27  |  1.45<H>    Ca    8.9      16 Aug 2017 02:56  Phos  3.4     08-16  Mg     2.4     08-16    TPro  6.0  /  Alb  2.5<L>  /  TBili  1.7<H>  /  DBili  1.0<H>  /  AST  20  /  ALT  17  /  AlkPhos  99  08-16      [ ] Reyez catheter, indication: N/A  Meds: potassium chloride   Solution 20 milliEquivalent(s) Oral every 12 hours        HEMATOLOGIC  Meds: aspirin  chewable 81 milliGRAM(s) Oral daily  enoxaparin Injectable 40 milliGRAM(s) SubCutaneous every 24 hours    [x] VTE Prophylaxis                                      10.1   14.1  )-----------( 211      ( 15 Aug 2017 01:13 )             29.4      PT/INR - ( 16 Aug 2017 02:56 )   PT: 13.7 sec;   INR: 1.25 ratio         PTT - ( 16 Aug 2017 02:56 )  PTT:21.8 sec         Transfusion     [ ] PRBC   [ ] Platelets   [ ] FFP   [ ] Cryoprecipitate      INFECTIOUS DISEASES  T(C): 36.8 (08-15-17 @ 23:00), Max: 37 (08-15-17 @ 08:00)    ENDOCRINE  Capillary Blood Glucose  132 (16 Aug 2017 00:00)  164 (15 Aug 2017 18:00)  167 (15 Aug 2017 12:00)  156 (15 Aug 2017 06:00)    Meds: insulin lispro (HumaLOG) corrective regimen sliding scale   SubCutaneous every 6 hours    ACCESS DEVICES:  [ x] Peripheral IV  [ ] Central Venous Line	[ ] R	[ ] L	[ ] IJ	[ ] Fem	[ ] SC	Placed:   [ ] Arterial Line		[ ] R	[ ] L	[ ] Fem	[ ] Rad	[ ] Ax	Placed:   [ ] PICC:					[ ] Mediport  [ ] Urinary Catheter, Date Placed:   [ ] Necessity of urinary, arterial, and venous catheters discussed    OTHER MEDICATIONS:      CODE STATUS:  full     IMAGING:CXR pending HISTORY  84 year old female s/p fall with bleeding 10.2 cm liver cyst s/p IR embolization complicated by altered mental status. CT Head was unchanged from prior exam.  Secondary to increased work of breathing she was started on nocturnal bipap.     24 HOUR EVENTS: Tube feeds at goal with lidocaine at 55. Pt was experiencing diarrhea and subsequently received lomotil.     SUBJECTIVE/ROS:  [ ] A ten-point review of systems was otherwise negative except as noted.  [x] Due to altered mental status/intubation, subjective information were not able to be obtained from the patient. History was obtained, to the extent possible, from review of the chart and collateral sources of information.      NEURO  Exam: AOx1  Meds: none  [x] Adequacy of sedation and pain control has been assessed and adjusted      RESPIRATORY  RR: 18 (08-16-17 @ 02:00) (16 - 32)  SpO2: 100% (08-16-17 @ 02:00) (97% - 100%)  Exam: unlabored, clear to auscultation bilaterally  Mechanical Ventilation:   ABG - ( 15 Aug 2017 01:16 )  pH: 7.50  /  pCO2: 39    /  pO2: 106   / HCO3: 30    / Base Excess: 6.2   /  SaO2: 99        CARDIOVASCULAR  HR: 77 (08-16-17 @ 02:00) (64 - 85)  BP: 134/66 (08-16-17 @ 02:00) (119/61 - 166/89)  BP(mean): 91 (08-16-17 @ 02:00) (82 - 120    Exam: rrr  Cardiac Rhythm: sinus  Perfusion     [x ]Adequate   [ ]Inadequate  Mentation   [ ]Normal       [x ]Reduced  Extremities  [ ]Warm         [x ]Cool  Volume Status [x ]Hypervolemic [ ]Euvolemic [ ]Hypovolemic  Meds: metolazone 5 milliGRAM(s) Oral daily  spironolactone 50 milliGRAM(s) Oral every 24 hours  furosemide   Injectable 20 milliGRAM(s) IV Push every 12 hours  metoprolol 50 milliGRAM(s) Oral every 8 hours        GI/NUTRITION  Exam: softly distended, NT  Diet: NPO/TF  Meds: pantoprazole   Suspension 40 milliGRAM(s) Oral daily  psyllium Powder 1 Packet(s) Oral every 8 hours  loperamide Solution 2 milliGRAM(s) Oral every 1 hour PRN Diarrhea      GENITOURINARY  I&O's Detail    15 Aug 2017 07:01  -  16 Aug 2017 07:00  --------------------------------------------------------  IN:    Enteral Tube Flush: 350 mL    Glucerna: 290 mL    IV PiggyBack: 300 mL    Vital HN: 770 mL  Total IN: 1710 mL    OUT:    Indwelling Catheter - Urethral: 2655 mL    Stool: 9 mL  Total OUT: 2664 mL    Total NET: -954 mL      08-16    141  |  99  |  66<H>  ----------------------------<  154<H>  3.9   |  27  |  1.45<H>    Ca    8.9      16 Aug 2017 02:56  Phos  3.4     08-16  Mg     2.4     08-16    TPro  6.0  /  Alb  2.5<L>  /  TBili  1.7<H>  /  DBili  1.0<H>  /  AST  20  /  ALT  17  /  AlkPhos  99  08-16      [ ] Reyez catheter, indication: N/A  Meds: potassium chloride   Solution 20 milliEquivalent(s) Oral every 12 hours        HEMATOLOGIC  Meds: aspirin  chewable 81 milliGRAM(s) Oral daily  enoxaparin Injectable 40 milliGRAM(s) SubCutaneous every 24 hours    [x] VTE Prophylaxis                                      10.1   14.1  )-----------( 211      ( 15 Aug 2017 01:13 )             29.4      PT/INR - ( 16 Aug 2017 02:56 )   PT: 13.7 sec;   INR: 1.25 ratio         PTT - ( 16 Aug 2017 02:56 )  PTT:21.8 sec         Transfusion     [ ] PRBC   [ ] Platelets   [ ] FFP   [ ] Cryoprecipitate      INFECTIOUS DISEASES  T(C): 36.8 (08-15-17 @ 23:00), Max: 37 (08-15-17 @ 08:00)    ENDOCRINE  Capillary Blood Glucose  132 (16 Aug 2017 00:00)  164 (15 Aug 2017 18:00)  167 (15 Aug 2017 12:00)  156 (15 Aug 2017 06:00)    Meds: insulin lispro (HumaLOG) corrective regimen sliding scale   SubCutaneous every 6 hours    ACCESS DEVICES:  [ x] Peripheral IV  [ ] Central Venous Line	[ ] R	[ ] L	[ ] IJ	[ ] Fem	[ ] SC	Placed:   [ ] Arterial Line		[ ] R	[ ] L	[ ] Fem	[ ] Rad	[ ] Ax	Placed:   [ ] PICC:					[ ] Mediport  [ ] Urinary Catheter, Date Placed:   [ ] Necessity of urinary, arterial, and venous catheters discussed    OTHER MEDICATIONS:      CODE STATUS:  full     IMAGING:CXR pending

## 2017-08-16 NOTE — PROGRESS NOTE ADULT - SUBJECTIVE AND OBJECTIVE BOX
INTERVAL HISTORY: feels ok    TELEMETRY: 5 beats nsvt  	  MEDICATIONS:  spironolactone 50 milliGRAM(s) Oral every 24 hours  metoprolol 100 milliGRAM(s) Oral every 12 hours  furosemide    Tablet 40 milliGRAM(s) Oral every 12 hours        PHYSICAL EXAM:  T(C): 36.6 (08-16-17 @ 19:00), Max: 37.2 (08-16-17 @ 15:00)  HR: 66 (08-16-17 @ 21:00) (64 - 83)  BP: 112/59 (08-16-17 @ 21:00) (112/59 - 148/67)  RR: 17 (08-16-17 @ 21:00) (16 - 40)  SpO2: 100% (08-16-17 @ 21:00) (58% - 100%)  Wt(kg): --  I&O's Summary    15 Aug 2017 07:01  -  16 Aug 2017 07:00  --------------------------------------------------------  IN: 1710 mL / OUT: 2764 mL / NET: -1054 mL    16 Aug 2017 07:01  -  16 Aug 2017 21:58  --------------------------------------------------------  IN: 700 mL / OUT: 311 mL / NET: 389 mL          Appearance: Normal	  HEENT:    PERRL, EOMI	  Lymphatic: No lymphadenopathy  Cardiovascular: Normal S1 S2, No JVD  Respiratory: Lungs clear to auscultation	  Psychiatry: Alert, Mood & affect appropriate  Gastrointestinal:  Soft, Non-tender, + BS	  Skin: No rashes, No cyanosis  Neurologic: Non-focal  Extremities:  No  cyanosis or edema  Vascular: Peripheral pulses palpable  bilaterally      CARDIAC MARKERS:                                  10.0   15.0  )-----------( 212      ( 16 Aug 2017 02:56 )             30.8     08-16    141  |  99  |  66<H>  ----------------------------<  154<H>  3.9   |  27  |  1.45<H>    Ca    8.9      16 Aug 2017 02:56  Phos  3.4     08-16  Mg     2.4     08-16    TPro  6.0  /  Alb  2.5<L>  /  TBili  1.7<H>  /  DBili  1.0<H>  /  AST  20  /  ALT  17  /  AlkPhos  99  08-16         ASSESSMENT/PLAN: 	  84F w/hx of Afib on Xarelto, MitraClip, sHF, presented to ED w/SOB and abdominal pain after multiple falls. Found to have hemorrhagic hepatic lesion with hemorrhagic shock now Hospital Day 7 Post-procedure Date 6 s/p IR embolization of hemorrhagic hepatic lesion (08/08/17)  Patient remains nonverbal (since admission) but follows commands (squeezes fingers, moves legs).  She is tolerating minesh-tube feeds, and had a bm yesterday, 08/13/17.  Head CT performed two days ago (08/12/17) shows no signs of new infarctions/bleeds. I was consulted for VT earlier today with ICD shock. She is off AC given life threatening bleed.     1. VT in the setting of significant hypokalemia s/p ICD shock. No further episodes of sustained VT. 5 beats NSVT  - increased metoprolol dose  - EP consult appreciated      2. AF - rate control with Metoprolol, 50mg q8. Off AC as life threatening bleed recently   - will consider restarting prior to discharge if deemed safe by surgery    3. sHF- diurese to keep net negative. Agree with Lasix and Metolazone for now and titrate to urine output and kidney function  - Continue with Aldactone for both sHF and hypokalemia. Monitor ins/outs and kidney function  - continue with Metoprolol, ASA    4. HTN - Well controlled on current regimen    5. Neuro - pt nonverbal still. Unclear Etiology. Neuro consult noted, MRI discontinued as ICD not device compatible          Patel Amaya DO [facility]  Cardiovascular Medicine  [phone]

## 2017-08-16 NOTE — PROGRESS NOTE ADULT - SUBJECTIVE AND OBJECTIVE BOX
INTERVAL HPI/OVERNIGHT EVENTS: patient resting in bed, appears mildly dyspneic.     MEDICATIONS  (STANDING):  insulin lispro (HumaLOG) corrective regimen sliding scale   SubCutaneous every 6 hours  aspirin  chewable 81 milliGRAM(s) Oral daily  pantoprazole   Suspension 40 milliGRAM(s) Oral daily  metolazone 5 milliGRAM(s) Oral daily  potassium chloride   Solution 20 milliEquivalent(s) Oral every 12 hours  spironolactone 50 milliGRAM(s) Oral every 24 hours  enoxaparin Injectable 40 milliGRAM(s) SubCutaneous every 24 hours  furosemide   Injectable 20 milliGRAM(s) IV Push every 12 hours  metoprolol 50 milliGRAM(s) Oral every 8 hours  psyllium Powder 1 Packet(s) Oral every 8 hours    MEDICATIONS  (PRN):  loperamide Solution 2 milliGRAM(s) Oral every 1 hour PRN Diarrhea      Allergies    No Known Allergies      ROS: unable due to patient's current status; lethargic ( arouses to verbal stimuli), nonverbal.     Vital Signs Last 24 Hrs  T(C): 37 (16 Aug 2017 11:00), Max: 37.1 (16 Aug 2017 07:00)  T(F): 98.6 (16 Aug 2017 11:00), Max: 98.7 (16 Aug 2017 07:00)  HR: 78 (16 Aug 2017 11:00) (66 - 85)  BP: 141/64 (16 Aug 2017 11:00) (120/62 - 166/89)  BP(mean): 92 (16 Aug 2017 11:00) (82 - 120)  RR: 29 (16 Aug 2017 11:00) (16 - 40)  SpO2: 99% (16 Aug 2017 11:00) (96% - 100%)    Physical Exam:  Constitutional: cachetic, mild respiratory distress    Neurological:  lethargic ( arouses to verbal stimuli), nonverbal ( Neuro following)     HEENT:   Neck supple.    Respiratory:  labored; CTA  anteriorly     Cardiovascular: (+) S1 & S2, regularly irregular     Gastrointestinal: soft, NT, nondistended, (+) BS    Genitourinary: + mcdaniels ; clear yellow urine     Extremities: No pedal edema,  bilateral PT pulses +1    Skin:  normal skin color and pigmentation      LABS:                        10.0   15.0  )-----------( 212      ( 16 Aug 2017 02:56 )             30.8     08-16    141  |  99  |  66<H>  ----------------------------<  154<H>  3.9   |  27  |  1.45<H>    Ca    8.9      16 Aug 2017 02:56  Phos  3.4     08-16  Mg     2.4     08-16    TPro  6.0  /  Alb  2.5<L>  /  TBili  1.7<H>  /  DBili  1.0<H>  /  AST  20  /  ALT  17  /  AlkPhos  99  08-16    PT/INR - ( 16 Aug 2017 02:56 )   PT: 13.7 sec;   INR: 1.25 ratio         PTT - ( 16 Aug 2017 02:56 )  PTT:21.8 sec      RADIOLOGY: Cxray pending     TELE: Atrial fib 60's to 70's with episodes of NSVT this am ( 3-5 beats)

## 2017-08-16 NOTE — PROGRESS NOTE ADULT - ASSESSMENT
84 y F s/p fall w/ bleeding 10.cm liver cyst, s/p IR embolization on 08/08/17  -f/u neuro consult and recs; neuro recommends MRI without contrast, MRA head and neck, F/u imaging and results, speech therapy, and monitor/correct K+ and BUN.  -BIPAP PRN for hx resp failure  -continue dvt ppx, trend h/h daily  -PT/rehab planning  -discuss goals of care w/ family given poor mental status  - no surgical intervention at this time  - f/u palliative care recs     Robyn Farooq PA-C   #[id number]

## 2017-08-17 LAB
ALBUMIN SERPL ELPH-MCNC: 2.6 G/DL — LOW (ref 3.3–5)
ALP SERPL-CCNC: 112 U/L — SIGNIFICANT CHANGE UP (ref 40–120)
ALT FLD-CCNC: 16 U/L RC — SIGNIFICANT CHANGE UP (ref 10–45)
ANION GAP SERPL CALC-SCNC: 16 MMOL/L — SIGNIFICANT CHANGE UP (ref 5–17)
APTT BLD: 30.8 SEC — SIGNIFICANT CHANGE UP (ref 27.5–37.4)
AST SERPL-CCNC: 17 U/L — SIGNIFICANT CHANGE UP (ref 10–40)
BILIRUB DIRECT SERPL-MCNC: 0.7 MG/DL — HIGH (ref 0–0.2)
BILIRUB INDIRECT FLD-MCNC: 0.7 MG/DL — SIGNIFICANT CHANGE UP (ref 0.2–1)
BILIRUB SERPL-MCNC: 1.4 MG/DL — HIGH (ref 0.2–1.2)
BUN SERPL-MCNC: 70 MG/DL — HIGH (ref 7–23)
CALCIUM SERPL-MCNC: 8.6 MG/DL — SIGNIFICANT CHANGE UP (ref 8.4–10.5)
CHLORIDE SERPL-SCNC: 97 MMOL/L — SIGNIFICANT CHANGE UP (ref 96–108)
CO2 SERPL-SCNC: 30 MMOL/L — SIGNIFICANT CHANGE UP (ref 22–31)
CREAT SERPL-MCNC: 1.43 MG/DL — HIGH (ref 0.5–1.3)
CULTURE RESULTS: SIGNIFICANT CHANGE UP
GLUCOSE SERPL-MCNC: 131 MG/DL — HIGH (ref 70–99)
HCT VFR BLD CALC: 32.8 % — LOW (ref 34.5–45)
HGB BLD-MCNC: 10.2 G/DL — LOW (ref 11.5–15.5)
INR BLD: 1.22 RATIO — HIGH (ref 0.88–1.16)
MAGNESIUM SERPL-MCNC: 2.4 MG/DL — SIGNIFICANT CHANGE UP (ref 1.6–2.6)
MCHC RBC-ENTMCNC: 28.3 PG — SIGNIFICANT CHANGE UP (ref 27–34)
MCHC RBC-ENTMCNC: 31 GM/DL — LOW (ref 32–36)
MCV RBC AUTO: 91.3 FL — SIGNIFICANT CHANGE UP (ref 80–100)
PHOSPHATE SERPL-MCNC: 3 MG/DL — SIGNIFICANT CHANGE UP (ref 2.5–4.5)
PLATELET # BLD AUTO: 242 K/UL — SIGNIFICANT CHANGE UP (ref 150–400)
POTASSIUM SERPL-MCNC: 3.7 MMOL/L — SIGNIFICANT CHANGE UP (ref 3.5–5.3)
POTASSIUM SERPL-SCNC: 3.7 MMOL/L — SIGNIFICANT CHANGE UP (ref 3.5–5.3)
PROT SERPL-MCNC: 6.3 G/DL — SIGNIFICANT CHANGE UP (ref 6–8.3)
PROTHROM AB SERPL-ACNC: 13.4 SEC — HIGH (ref 9.8–12.7)
RBC # BLD: 3.59 M/UL — LOW (ref 3.8–5.2)
RBC # FLD: 15.2 % — HIGH (ref 10.3–14.5)
SODIUM SERPL-SCNC: 143 MMOL/L — SIGNIFICANT CHANGE UP (ref 135–145)
SPECIMEN SOURCE: SIGNIFICANT CHANGE UP
WBC # BLD: 16.8 K/UL — HIGH (ref 3.8–10.5)
WBC # FLD AUTO: 16.8 K/UL — HIGH (ref 3.8–10.5)

## 2017-08-17 PROCEDURE — 71010: CPT | Mod: 26

## 2017-08-17 RX ADMIN — Medication 100 MILLIGRAM(S): at 18:58

## 2017-08-17 RX ADMIN — Medication 20 MILLIEQUIVALENT(S): at 06:06

## 2017-08-17 RX ADMIN — Medication 100 MILLIGRAM(S): at 06:06

## 2017-08-17 RX ADMIN — Medication 2: at 02:35

## 2017-08-17 RX ADMIN — Medication 2: at 14:38

## 2017-08-17 RX ADMIN — Medication 81 MILLIGRAM(S): at 14:38

## 2017-08-17 RX ADMIN — ENOXAPARIN SODIUM 40 MILLIGRAM(S): 100 INJECTION SUBCUTANEOUS at 18:59

## 2017-08-17 RX ADMIN — Medication 20 MILLIEQUIVALENT(S): at 18:58

## 2017-08-17 RX ADMIN — Medication 1 PACKET(S): at 21:09

## 2017-08-17 RX ADMIN — Medication 40 MILLIGRAM(S): at 18:58

## 2017-08-17 RX ADMIN — Medication 1 PACKET(S): at 06:06

## 2017-08-17 RX ADMIN — PANTOPRAZOLE SODIUM 40 MILLIGRAM(S): 20 TABLET, DELAYED RELEASE ORAL at 14:38

## 2017-08-17 RX ADMIN — Medication 1 PACKET(S): at 14:38

## 2017-08-17 RX ADMIN — Medication 40 MILLIGRAM(S): at 06:06

## 2017-08-17 RX ADMIN — Medication 2: at 18:59

## 2017-08-17 RX ADMIN — SPIRONOLACTONE 50 MILLIGRAM(S): 25 TABLET, FILM COATED ORAL at 06:06

## 2017-08-17 NOTE — PROGRESS NOTE ADULT - SUBJECTIVE AND OBJECTIVE BOX
INTERVAL HISTORY: none    TELEMETRY: AF, no further VT  	  MEDICATIONS:  spironolactone 50 milliGRAM(s) Oral every 24 hours  metoprolol 100 milliGRAM(s) Oral every 12 hours  furosemide    Tablet 40 milliGRAM(s) Oral every 12 hours        PHYSICAL EXAM:  T(C): 36.7 (08-17-17 @ 12:31), Max: 36.7 (08-17-17 @ 05:45)  HR: 82 (08-17-17 @ 12:31) (64 - 89)  BP: 112/62 (08-17-17 @ 12:31) (112/59 - 145/89)  RR: 18 (08-17-17 @ 12:31) (16 - 22)  SpO2: 100% (08-17-17 @ 12:31) (96% - 100%)  Wt(kg): --  I&O's Summary    16 Aug 2017 07:01  -  17 Aug 2017 07:00  --------------------------------------------------------  IN: 1140 mL / OUT: 311 mL / NET: 829 mL          Appearance: Normal	  HEENT:    PERRL, EOMI	  Lymphatic: No lymphadenopathy  Cardiovascular: Normal S1 S2, No JVD  Respiratory: Lungs clear to auscultation	  Psychiatry: Alert, Mood & affect appropriate  Gastrointestinal:  Soft, Non-tender, + BS	  Skin: No rashes, No cyanosis  Neurologic: Non-focal  Extremities:  No  cyanosis or edema  Vascular: Peripheral pulses palpable  bilaterally      CARDIAC MARKERS:                                  10.2   16.8  )-----------( 242      ( 17 Aug 2017 06:56 )             32.8     08-17    143  |  97  |  70<H>  ----------------------------<  131<H>  3.7   |  30  |  1.43<H>    Ca    8.6      17 Aug 2017 06:56  Phos  3.0     08-17  Mg     2.4     08-17    TPro  6.3  /  Alb  2.6<L>  /  TBili  1.4<H>  /  DBili  0.7<H>  /  AST  17  /  ALT  16  /  AlkPhos  112  08-17      ASSESSMENT/PLAN: 	  84F w/hx of Afib on Xarelto, MitraClip, sHF, presented to ED w/SOB and abdominal pain after multiple falls. Found to have hemorrhagic hepatic lesion with hemorrhagic shock now Hospital Day 7 Post-procedure Date 6 s/p IR embolization of hemorrhagic hepatic lesion (08/08/17)  Patient remains nonverbal (since admission) but follows commands (squeezes fingers, moves legs).  She is tolerating minesh-tube feeds, and had a bm yesterday, 08/13/17.  Head CT performed two days ago (08/12/17) shows no signs of new infarctions/bleeds. I was consulted for VT with ICD shock. She is off AC given life threatening bleed.     1. VT in the setting of significant hypokalemia s/p ICD shock. No further episodes of sustained VT.   - increased metoprolol dose  - EP follow up appreciated      2. AF - rate control with Metoprolol, 50mg q8. Off AC as life threatening bleed recently   - will consider restarting prior to discharge if deemed safe by surgery    3. sHF- diurese to keep net negative. Agree with Lasix 40mg PO BID  - Continue with Aldactone for both sHF and hypokalemia. Monitor ins/outs and kidney function  - continue with Metoprolol, ASA  - Euvolemic    4. HTN - Well controlled on current regimen    5. Neuro - pt nonverbal still. Unclear Etiology. Neuro consult noted, MRI discontinued as ICD not device compatible          Patel Amaya DO [facility]  Cardiovascular Medicine  [phone]

## 2017-08-17 NOTE — CHART NOTE - NSCHARTNOTEFT_GEN_A_CORE
spoke with palliative care fellow- will arrange with son Jose and Dr. Restrepo meeting for tomorrow for goals of care- advised that Dr. Narayanan has no surgical plans for patient    Zenaida Negrete PA-C [id number]

## 2017-08-17 NOTE — PROGRESS NOTE ADULT - ASSESSMENT
84 y F HD 10, Post-procedure day 9 s/p IR embolization for hemorrhagic hepatic lesion s/p fall.  -remains lethargic and nonverbal but more alert than yesterday, neuro appears to be improving; speech and swallow therapy yesterday failed; speech therapy needed.  -f/u neuro consult and recs; neuro recommends MRI without contrast, MRA head and neck however patient has AICD and is unable to get MRIs.  -BIPAP PRN for hx resp failure  -continue dvt ppx, trend h/h daily  -PT/rehab planning  -discuss goals of care w/ family given poor mental status  - no surgical intervention at this time

## 2017-08-17 NOTE — PROGRESS NOTE ADULT - SUBJECTIVE AND OBJECTIVE BOX
Surgery Blue Team Progress Note:  Hospital Day 10 Post procedure day 9 s/p IR drainage of hemorrhagic hepatic lesion.    Subjective:  Patient transferred from SICU yesterday, 08/16/17, to wards. Reyez was removed prior to transfer.    No acute overnight events. Patient resting comfortably,  lethargic but responds to commands nonverbally.    Objective:  Vitals:  ICU Vital Signs Last 24 Hrs  T(C): 36.7 (17 Aug 2017 12:31), Max: 36.7 (17 Aug 2017 05:45)  T(F): 98.1 (17 Aug 2017 12:31), Max: 98.1 (17 Aug 2017 12:31)  HR: 82 (17 Aug 2017 12:31) (64 - 89)  BP: 112/62 (17 Aug 2017 12:31) (112/59 - 145/89)  BP(mean): 80 (16 Aug 2017 21:00) (80 - 112)  ABP: --  ABP(mean): --  RR: 18 (17 Aug 2017 12:31) (16 - 20)  SpO2: 100% (17 Aug 2017 12:31) (96% - 100%)    Labs:                        10.2   16.8  )-----------( 242      ( 17 Aug 2017 06:56 )             32.8       08-17    143  |  97  |  70<H>  ----------------------------<  131<H>  3.7   |  30  |  1.43<H>    Ca    8.6      17 Aug 2017 06:56  Phos  3.0     08-17  Mg     2.4     08-17    TPro  6.3  /  Alb  2.6<L>  /  TBili  1.4<H>  /  DBili  0.7<H>  /  AST  17  /  ALT  16  /  AlkPhos  112  08-17      I&O's Detail    16 Aug 2017 07:01  -  17 Aug 2017 07:00  --------------------------------------------------------  IN:    IV PiggyBack: 50 mL    Oral Fluid: 100 mL    Vital HN: 990 mL  Total IN: 1140 mL    OUT:    Indwelling Catheter - Urethral: 310 mL    Stool: 1 mL  Total OUT: 311 mL    Total NET: 829 mL    Focused Physical exam:  General: NAD, alert but lethargic, nonverbal and unable to fully assess orientation.  Respiratory: Nonlabored breathing, on supplemental O2/ nasal cannula  Abdomen: Soft, NT, ND

## 2017-08-18 DIAGNOSIS — R10.11 RIGHT UPPER QUADRANT PAIN: ICD-10-CM

## 2017-08-18 DIAGNOSIS — R53.81 OTHER MALAISE: ICD-10-CM

## 2017-08-18 LAB
ALBUMIN SERPL ELPH-MCNC: 2.6 G/DL — LOW (ref 3.3–5)
ALP SERPL-CCNC: 108 U/L — SIGNIFICANT CHANGE UP (ref 40–120)
ALT FLD-CCNC: 15 U/L RC — SIGNIFICANT CHANGE UP (ref 10–45)
ANION GAP SERPL CALC-SCNC: 10 MMOL/L — SIGNIFICANT CHANGE UP (ref 5–17)
APTT BLD: 29.6 SEC — SIGNIFICANT CHANGE UP (ref 27.5–37.4)
AST SERPL-CCNC: 17 U/L — SIGNIFICANT CHANGE UP (ref 10–40)
BILIRUB DIRECT SERPL-MCNC: 0.9 MG/DL — HIGH (ref 0–0.2)
BILIRUB INDIRECT FLD-MCNC: 0.6 MG/DL — SIGNIFICANT CHANGE UP (ref 0.2–1)
BILIRUB SERPL-MCNC: 1.5 MG/DL — HIGH (ref 0.2–1.2)
BUN SERPL-MCNC: 72 MG/DL — HIGH (ref 7–23)
CALCIUM SERPL-MCNC: 8.4 MG/DL — SIGNIFICANT CHANGE UP (ref 8.4–10.5)
CHLORIDE SERPL-SCNC: 100 MMOL/L — SIGNIFICANT CHANGE UP (ref 96–108)
CO2 SERPL-SCNC: 32 MMOL/L — HIGH (ref 22–31)
CREAT SERPL-MCNC: 1.2 MG/DL — SIGNIFICANT CHANGE UP (ref 0.5–1.3)
GLUCOSE SERPL-MCNC: 171 MG/DL — HIGH (ref 70–99)
HCT VFR BLD CALC: 30.7 % — LOW (ref 34.5–45)
HGB BLD-MCNC: 9.8 G/DL — LOW (ref 11.5–15.5)
INR BLD: 1.25 RATIO — HIGH (ref 0.88–1.16)
MAGNESIUM SERPL-MCNC: 1.9 MG/DL — SIGNIFICANT CHANGE UP (ref 1.6–2.6)
MCHC RBC-ENTMCNC: 28.8 PG — SIGNIFICANT CHANGE UP (ref 27–34)
MCHC RBC-ENTMCNC: 31.8 GM/DL — LOW (ref 32–36)
MCV RBC AUTO: 90.5 FL — SIGNIFICANT CHANGE UP (ref 80–100)
PHOSPHATE SERPL-MCNC: 2.3 MG/DL — LOW (ref 2.5–4.5)
PLATELET # BLD AUTO: 212 K/UL — SIGNIFICANT CHANGE UP (ref 150–400)
POTASSIUM SERPL-MCNC: 4.2 MMOL/L — SIGNIFICANT CHANGE UP (ref 3.5–5.3)
POTASSIUM SERPL-SCNC: 4.2 MMOL/L — SIGNIFICANT CHANGE UP (ref 3.5–5.3)
PROT SERPL-MCNC: 6.2 G/DL — SIGNIFICANT CHANGE UP (ref 6–8.3)
PROTHROM AB SERPL-ACNC: 13.6 SEC — HIGH (ref 9.8–12.7)
RBC # BLD: 3.39 M/UL — LOW (ref 3.8–5.2)
RBC # FLD: 15.2 % — HIGH (ref 10.3–14.5)
SODIUM SERPL-SCNC: 142 MMOL/L — SIGNIFICANT CHANGE UP (ref 135–145)
WBC # BLD: 17.8 K/UL — HIGH (ref 3.8–10.5)
WBC # FLD AUTO: 17.8 K/UL — HIGH (ref 3.8–10.5)

## 2017-08-18 RX ORDER — HYDROMORPHONE HYDROCHLORIDE 2 MG/ML
0.2 INJECTION INTRAMUSCULAR; INTRAVENOUS; SUBCUTANEOUS EVERY 4 HOURS
Qty: 0 | Refills: 0 | Status: DISCONTINUED | OUTPATIENT
Start: 2017-08-18 | End: 2017-08-21

## 2017-08-18 RX ADMIN — Medication 81 MILLIGRAM(S): at 12:18

## 2017-08-18 RX ADMIN — ENOXAPARIN SODIUM 40 MILLIGRAM(S): 100 INJECTION SUBCUTANEOUS at 19:00

## 2017-08-18 RX ADMIN — Medication 2: at 07:36

## 2017-08-18 RX ADMIN — Medication 100 MILLIGRAM(S): at 05:26

## 2017-08-18 RX ADMIN — Medication 20 MILLIEQUIVALENT(S): at 05:30

## 2017-08-18 RX ADMIN — Medication 40 MILLIGRAM(S): at 05:26

## 2017-08-18 RX ADMIN — HYDROMORPHONE HYDROCHLORIDE 0.2 MILLIGRAM(S): 2 INJECTION INTRAMUSCULAR; INTRAVENOUS; SUBCUTANEOUS at 12:50

## 2017-08-18 RX ADMIN — HYDROMORPHONE HYDROCHLORIDE 0.2 MILLIGRAM(S): 2 INJECTION INTRAMUSCULAR; INTRAVENOUS; SUBCUTANEOUS at 12:18

## 2017-08-18 RX ADMIN — SPIRONOLACTONE 50 MILLIGRAM(S): 25 TABLET, FILM COATED ORAL at 05:26

## 2017-08-18 RX ADMIN — Medication 100 MILLIGRAM(S): at 19:00

## 2017-08-18 RX ADMIN — Medication 20 MILLIEQUIVALENT(S): at 19:00

## 2017-08-18 RX ADMIN — Medication 1 PACKET(S): at 05:26

## 2017-08-18 RX ADMIN — PANTOPRAZOLE SODIUM 40 MILLIGRAM(S): 20 TABLET, DELAYED RELEASE ORAL at 14:41

## 2017-08-18 RX ADMIN — Medication 40 MILLIGRAM(S): at 19:00

## 2017-08-18 RX ADMIN — Medication 63.75 MILLIMOLE(S): at 21:39

## 2017-08-18 NOTE — GOALS OF CARE CONVERSATION - PERSONAL ADVANCE DIRECTIVE - NS PRO AD PATIENT TYPE ON CHART
Do Not Resuscitate (DNR)/Health Care Proxy (HCP)/Medical Orders for Life-Sustaining Treatment (MOLST)

## 2017-08-18 NOTE — PROGRESS NOTE ADULT - PROBLEM SELECTOR PLAN 5
Patient is weak.  Primarily in bed.  Speech and swallow pending - see GOC note in EMR Optimally managed, plan as per cardiology

## 2017-08-18 NOTE — PROGRESS NOTE ADULT - PROBLEM SELECTOR PLAN 2
Improved somewhat, patient able to answer simple questions with one word answers, nods, hand squeeze. s/p embolization and resuscitation.  Underlying mass.

## 2017-08-18 NOTE — PROGRESS NOTE ADULT - SUBJECTIVE AND OBJECTIVE BOX
INTERVAL HISTORY:    TELEMETRY:  	  MEDICATIONS:  spironolactone 50 milliGRAM(s) Oral every 24 hours  metoprolol 100 milliGRAM(s) Oral every 12 hours  furosemide    Tablet 40 milliGRAM(s) Oral every 12 hours        PHYSICAL EXAM:  T(C): 36.6 (08-18-17 @ 12:45), Max: 37.1 (08-17-17 @ 20:48)  HR: 71 (08-18-17 @ 12:17) (60 - 82)  BP: 129/77 (08-18-17 @ 12:17) (113/66 - 147/79)  RR: 18 (08-18-17 @ 12:45) (18 - 18)  SpO2: 99% (08-18-17 @ 04:35) (98% - 100%)  Wt(kg): --  I&O's Summary        Appearance: Normal	  HEENT:    PERRL, EOMI	  Lymphatic: No lymphadenopathy  Cardiovascular: Normal S1 S2, No JVD  Respiratory: Lungs clear to auscultation	  Psychiatry: Alert, Mood & affect appropriate  Gastrointestinal:  Soft, Non-tender, + BS	  Skin: No rashes, No cyanosis  Neurologic: Non-focal  Extremities:  No  cyanosis or edema  Vascular: Peripheral pulses palpable  bilaterally      CARDIAC MARKERS:                                  9.8    17.8  )-----------( 212      ( 18 Aug 2017 07:31 )             30.7     08-18    142  |  100  |  72<H>  ----------------------------<  171<H>  4.2   |  32<H>  |  1.20    Ca    8.4      18 Aug 2017 07:31  Phos  2.3     08-18  Mg     1.9     08-18    TPro  6.2  /  Alb  2.6<L>  /  TBili  1.5<H>  /  DBili  0.9<H>  /  AST  17  /  ALT  15  /  AlkPhos  108  08-18    proBNP:   Lipid Profile:   HgA1c:   TSH:     ASSESSMENT/PLAN: 	          Patel Amaya DO [facility]  Cardiovascular Medicine  [phone] INTERVAL HISTORY: no changes    TELEMETRY: no events  	  MEDICATIONS:  spironolactone 50 milliGRAM(s) Oral every 24 hours  metoprolol 100 milliGRAM(s) Oral every 12 hours  furosemide    Tablet 40 milliGRAM(s) Oral every 12 hours        PHYSICAL EXAM:  T(C): 36.6 (08-18-17 @ 12:45), Max: 37.1 (08-17-17 @ 20:48)  HR: 71 (08-18-17 @ 12:17) (60 - 82)  BP: 129/77 (08-18-17 @ 12:17) (113/66 - 147/79)  RR: 18 (08-18-17 @ 12:45) (18 - 18)  SpO2: 99% (08-18-17 @ 04:35) (98% - 100%)  Wt(kg): --  I&O's Summary        Appearance: Normal	  HEENT:    PERRL, EOMI	  Lymphatic: No lymphadenopathy  Cardiovascular: Normal S1 S2, No JVD  Respiratory: Lungs clear to auscultation	  Psychiatry: Alert, Mood & affect appropriate  Gastrointestinal:  Soft, Non-tender, + BS	  Skin: No rashes, No cyanosis  Neurologic: Non-focal  Extremities:  No  cyanosis or edema  Vascular: Peripheral pulses palpable  bilaterally      CARDIAC MARKERS:                                  9.8    17.8  )-----------( 212      ( 18 Aug 2017 07:31 )             30.7     08-18    142  |  100  |  72<H>  ----------------------------<  171<H>  4.2   |  32<H>  |  1.20    Ca    8.4      18 Aug 2017 07:31  Phos  2.3     08-18  Mg     1.9     08-18    TPro  6.2  /  Alb  2.6<L>  /  TBili  1.5<H>  /  DBili  0.9<H>  /  AST  17  /  ALT  15  /  AlkPhos  108  08-18    :     ASSESSMENT/PLAN: 	  84F w/hx of Afib on Xarelto, MitraClip, sHF, presented to ED w/SOB and abdominal pain after multiple falls. Found to have hemorrhagic hepatic lesion with hemorrhagic shock now Hospital Day 7 Post-procedure Date 6 s/p IR embolization of hemorrhagic hepatic lesion (08/08/17)  Patient remains nonverbal (since admission) but follows commands (squeezes fingers, moves legs).  She is tolerating minesh-tube feeds, and had a bm yesterday, 08/13/17.  Head CT performed two days ago (08/12/17) shows no signs of new infarctions/bleeds. I was consulted for VT with ICD shock. She is off AC given life threatening bleed.     1. VT in the setting of significant hypokalemia s/p ICD shock. No further episodes of sustained VT.   - increased metoprolol dose  - EP follow up appreciated      2. AF - rate control with Metoprolol, 50mg q8. Off AC as life threatening bleed recently   - will consider restarting prior to discharge if deemed safe by surgery    3. sHF- diurese to keep net negative. Agree with Lasix 40mg PO BID  - Continue with Aldactone for both sHF and hypokalemia. Monitor ins/outs and kidney function  - continue with Metoprolol, ASA  - Euvolemic    4. HTN - Well controlled on current regimen    5. Neuro - pt nonverbal still. Unclear Etiology. Neuro consult noted, MRI discontinued as ICD not device compatible        Patel Amaya DO [facility]  Cardiovascular Medicine  [phone]

## 2017-08-18 NOTE — GOALS OF CARE CONVERSATION - PERSONAL ADVANCE DIRECTIVE - TREATMENT GUIDELINE COMMENT
As above.  Son hesitant to forgo trial of IVF at this time.  He does want his mother to be comfortable.  Considering nursing home with hospice care vs hospice at home with private hire support.

## 2017-08-18 NOTE — PROVIDER CONTACT NOTE (OTHER) - REASON
Pt hypotensive systolic 80's
Pt unable to vocalize, cough post extubation
Right groin sheath with 500cc bag with 1,000u heparin
UO remains persistently low
pt had 5 beats of WCT

## 2017-08-18 NOTE — PROVIDER CONTACT NOTE (OTHER) - BACKGROUND
Pt admitted as level I trauma s/p fall with active bleeding of R hepatic tumor s/p IR embolization
Pt s/p IR embolization of ruptured hepatic tumor requiring multiple transfusions with extensive PMH
Pt s/p R groin sheath for R liver embolization for R hepatic tumor rupture
hx a-fibb, 1 run of v-fibb during the day and has been hypotensive since
pt admitted for ABD pain, sob, intraperitoneal bleed, hx: chf, DM2

## 2017-08-18 NOTE — GOALS OF CARE CONVERSATION - PERSONAL ADVANCE DIRECTIVE - CONVERSATION DETAILS
Met with son (HCP)  and surgical resident.  Reviewed patient condition in light of liver mass that is likely malignant, s/p hemorrhagic shock with resuscitation, breast cancer with poor tolerance to treatment at 3rd session, fall and functional decline.  It was determined to pursue conservative care with a focus on comfort.

## 2017-08-18 NOTE — PROVIDER CONTACT NOTE (OTHER) - ASSESSMENT
pt unable to verbalize, pt shakes head when asked if she is having palpitations and chest pain. oral temp 97.6, hr 83, bp 128/76, reps 17, pox 100% on 2L,
 /62 SPO2 100% on 40% aerosol mask RR 22. No stridor noted.
 /70 SpO2 94% RR 19. Flotrac in progress, inaccurate due to afib.
 /74 RR 18 Spo2 94%. Vascular checks in progress, unchanged. Site appears WDL.
Pt awake alert following commands on light sedation of  1 mcg fentanyl. hypotensive systolic 84 map 60

## 2017-08-18 NOTE — PROGRESS NOTE ADULT - PROBLEM SELECTOR PLAN 1
s/p embolization and resuscitation.  Underlying mass. RUQ, epigastric/periumbilical site of hemorrhage/tumor.  Recommend trial of IV dilaudid 0.2mg prn every four hours.  Bowel reg.

## 2017-08-18 NOTE — PROGRESS NOTE ADULT - SUBJECTIVE AND OBJECTIVE BOX
INTERVAL HPI/OVERNIGHT EVENTS: Mrs. Joyner is out of the ICU and on a regular surgical floor.  She is responsive to questions with single word answers or hand squeeze.  She is very weak, and totally dependent for ADL's      Allergies    No Known Allergies    Intolerances      ADVANCE DIRECTIVES:    DNR   MOLST [x ] YES [ ] NO  completed today.    MEDICATIONS  (STANDING):  insulin lispro (HumaLOG) corrective regimen sliding scale   SubCutaneous every 6 hours  aspirin  chewable 81 milliGRAM(s) Oral daily  pantoprazole   Suspension 40 milliGRAM(s) Oral daily  potassium chloride   Solution 20 milliEquivalent(s) Oral every 12 hours  spironolactone 50 milliGRAM(s) Oral every 24 hours  enoxaparin Injectable 40 milliGRAM(s) SubCutaneous every 24 hours  psyllium Powder 1 Packet(s) Oral every 8 hours  metoprolol 100 milliGRAM(s) Oral every 12 hours  furosemide    Tablet 40 milliGRAM(s) Oral every 12 hours    MEDICATIONS  (PRN):  loperamide Solution 2 milliGRAM(s) Oral every 1 hour PRN Diarrhea  HYDROmorphone  Injectable 0.2 milliGRAM(s) IV Push every 4 hours PRN Severe Pain (7 - 10)      PRESENT SYMPTOMS:  Source: [x ] Patient   [ ] Family   [ ] Team     Pain:                        [ ] No [ x] Yes             [ ] Mild [x ] Moderate [ ] Severe  Pain at site of liver mass.  Patient unable to qualify or quantify due to weakness and minimal voice    Onset -  Location -  Duration -  Character -  Alleviating/Aggravating -  Radiation -  Timing -    Dyspnea:                [ x] No [ ] Yes             [ ] Mild [ ] Moderate [ ] Severe    Anxiety:                  x[ ] No [ ] Yes             [ ] Mild [ ] Moderate [ ] Severe    Fatigue:                  [ ] No [x ] Yes             [ ] Mild [ ] Moderate [x ] Severe    Nausea:                  [x ] No [ ] Yes             [ ] Mild [ ] Moderate [ ] Severe    Loss of appetite:   [ ] No [x ] Yes             [ ] Mild [ ] Moderate [x ] Severe    Constipation:        [x ] No [ ] Yes             [ ] Mild [ ] Moderate [ ] Severe    Other Symptoms:  [ ] All other review of systems negative   [ x] Unable to obtain due to poor mentation     Karnofsky Performance Score/Palliative Performance Status Version 2:  10       %    PHYSICAL EXAM:  Vital Signs Last 24 Hrs  T(C): 36.8 (18 Aug 2017 04:35), Max: 37.1 (17 Aug 2017 20:48)  T(F): 98.3 (18 Aug 2017 04:35), Max: 98.7 (17 Aug 2017 20:48)  HR: 77 (18 Aug 2017 04:35) (60 - 82)  BP: 144/67 (18 Aug 2017 04:35) (112/62 - 147/79)  BP(mean): --  RR: 18 (18 Aug 2017 04:35) (18 - 18)  SpO2: 99% (18 Aug 2017 04:35) (98% - 100%) I&O's Summary      General:  [ ] Alert  [ ] Oriented x      [x ] Lethargic  [ ] Agitated   [ ] Cachexia   [ ] Unarousable  [x ] Verbal minimal  [ ] Non-Verbal    HEENT:  [ ] Normal   [x ] Dry mouth   [ ] ET Tube    [ ] Trach  [ ] Oral lesions    Lungs:   x[ ] Clear [ ] Tachypnea  [x ] Audible excessive secretions upper airway  [ ] Rhonchi        [ ] Right [ ] Left [ ] Bilateral  [ ] Crackles        [ ] Right [ ] Left [ ] Bilateral  [ ] Wheezing     [ ] Right [ ] Left [ ] Bilateral    Cardiovascular:  [x ] Regular [ ] Irregular [ ] Tachycardia   [ ] Bradycardia  [ ] Murmur [ ] Other +S1 +S2     Abdomen: [x ] Soft  [x ] Distended   [x ] +BS  [ ] Non tender [x ] Tender  ruq and epigastric area [ ]PEG   [ x]OGT/ NGT   Last BM:       Genitourinary: [ ] Normal [x ] Incontinent   [ ] Oliguria/Anuria   [ ] Reyez    Musculoskeletal:  [ ] Normal   [x ] Weakness  [x ] Bedbound/Wheelchair bound [ ] Edema    Neurological: [x ] No focal deficits  [ ] Cognitive impairment  [ x] Dysphagia [ ] Dysarthria [ ] Paresis [ ] Other     Skin: [x ] Normal   [ ] Pressure ulcer(s)                  [ ] Rash    LABS:                        9.8    17.8  )-----------( 212      ( 18 Aug 2017 07:31 )             30.7     08-18    142  |  100  |  72<H>  ----------------------------<  171<H>  4.2   |  32<H>  |  1.20    Ca    8.4      18 Aug 2017 07:31  Phos  2.3     08-18  Mg     1.9     08-18    TPro  6.2  /  Alb  2.6<L>  /  TBili  1.5<H>  /  DBili  0.9<H>  /  AST  17  /  ALT  15  /  AlkPhos  108  08-18    PT/INR - ( 18 Aug 2017 07:31 )   PT: 13.6 sec;   INR: 1.25 ratio         PTT - ( 18 Aug 2017 07:31 )  PTT:29.6 sec      Shock: [ ] Septic [ ] Cardiogenic [ ] Neurologic [ ] Hypovolemic  Vasopressors x   Inotropes x     Oral Intake: [ ] Unable/mouth care only [ ] Minimal [ ] Moderate [ ] Full Capability    Diet: [ ] NPO [ ] Tube feeds [ ] TPN [ ] Other     RADIOLOGY & ADDITIONAL STUDIES:    REFERRALS:   [x ] Chaplaincy  [x ] Hospice  [ ] Child Life  [ ] Social Work  [ ] Case management [ ] Holistic Therapy

## 2017-08-18 NOTE — PROGRESS NOTE ADULT - SUBJECTIVE AND OBJECTIVE BOX
Surgery Blue Team Progress Note:    Hospital Day 11, post procedure date 10 s/p  IR embolization of hemorrhagic hepatic lesion.    Subjective:  No acute overnight events.  Patient examined at bedside, remains lethargic and nonverbal but appears to be more alert this morning.    Objective:  Vitals:  ICU Vital Signs Last 24 Hrs  T(C): 36.8 (18 Aug 2017 04:35), Max: 37.1 (17 Aug 2017 20:48)  T(F): 98.3 (18 Aug 2017 04:35), Max: 98.7 (17 Aug 2017 20:48)  HR: 77 (18 Aug 2017 04:35) (60 - 82)  BP: 144/67 (18 Aug 2017 04:35) (112/62 - 147/79)  BP(mean): --  ABP: --  ABP(mean): --  RR: 18 (18 Aug 2017 04:35) (18 - 18)  SpO2: 99% (18 Aug 2017 04:35) (98% - 100%)    Labs:                        9.8    17.8  )-----------( 212      ( 18 Aug 2017 07:31 )             30.7       08-18    142  |  100  |  72<H>  ----------------------------<  171<H>  4.2   |  32<H>  |  1.20    Ca    8.4      18 Aug 2017 07:31  Phos  2.3     08-18  Mg     1.9     08-18    TPro  6.2  /  Alb  2.6<L>  /  TBili  1.5<H>  /  DBili  0.9<H>  /  AST  17  /  ALT  15  /  AlkPhos  108  08-18      I&O's Detail    Physical Exam:  General: NAD, lethargic, unable to fully assess mental status as patient is nonverbal and occasionally follows commands  Respiratory: nonlabored breathing, has keotube feed intact (55ml/hr)  Abdomen: soft, nt, nd

## 2017-08-18 NOTE — PROGRESS NOTE ADULT - ASSESSMENT
84 y F HD 11, Post-procedure day 10 s/p IR embolization for hemorrhagic hepatic lesion s/p fall.  -remains lethargic and nonverbal but more alert than yesterday, neuro appears to be improving; speech and swallow therapy 2 days ago failed; speech therapy needed.  -f/u neuro consult and recs; neuro recommends MRI without contrast, MRA head and neck however patient has AICD and is unable to get MRIs, consider CT head.  -BIPAP PRN for hx resp failure  -continue dvt ppx, trend h/h daily  -PT/rehab planning; palliative care (Dr. Restrepo) spoke to patient's son today, 08/18/18, patient's son is deciding on nursing facility to discharge the patient to with palliative care.  Trial of IV dilaudid 0.2mg prn every four hours ordered by palliative team for abdominal pain/comfort.  -Discussed plan w/ patient's son- no surgical intervention at this time 84 y F HD 11, Post-procedure day 10 s/p IR embolization for hemorrhagic hepatic lesion s/p fall.  -remains lethargic and nonverbal but more alert than yesterday, neuro appears to be improving; speech and swallow therapy 2 days ago failed; speech therapy needed.  -f/u neuro consult and recs; neuro recommends MRI without contrast, MRA head and neck however patient has AICD and is unable to get MRIs, consider CT head.  -BIPAP PRN for hx resp failure  -continue dvt ppx, trend h/h daily  -PT/rehab planning; palliative care (Dr. Restrepo) spoke to patient's son today, 08/18/18, patient's son is deciding on nursing facility to discharge the patient to with palliative care.  Trial of IV dilaudid 0.2mg prn every four hours ordered by palliative team for abdominal pain/comfort.  Patient's son states that the patient likely would not want artificial feedings and is considering discontinuing tube feeds.  -Discussed plan w/ patient's son- no surgical intervention at this time

## 2017-08-18 NOTE — PROGRESS NOTE ADULT - ATTENDING COMMENTS
I have personally seen and examined the patient and completed the note.    20  minutes for advanced care planning discussion separate and in addition to the e and m service provided.

## 2017-08-18 NOTE — PROGRESS NOTE ADULT - ASSESSMENT
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  Met with Son to discuss GOC and advanced care planning.

## 2017-08-18 NOTE — GOALS OF CARE CONVERSATION - PERSONAL ADVANCE DIRECTIVE - WHAT MATTERS MOST
Freedom from pain and suffering.  The patient had wished to be home, but lives alone and son is working on options so that she is safe and well cared for.  We discussed that if she could speak, she would agree to whatever the best care plan was.

## 2017-08-18 NOTE — PROGRESS NOTE ADULT - SUBJECTIVE AND OBJECTIVE BOX
This is a 84 year old female with PMH Afib (was on Xarelto), CAD, s/p mitral clip, CABG x 3 (7/1/2013), Aflutter, HF, EF 35-40%, s/p single chamber ICD (BSC) on 5/23/17 for inducible sustained monomorphic VT, breast CA s/p resection/chemo, HTN, HLD, DM2, p/w SOB and abdominal pain, found to have large intraperitoneal bleed s/p IR embolization of hemorrhagic hepatic lesion on 8/8/17. Course c/b s/p ICD shock on 8/14 for monomorphic VT in the setting of hypokalemia.     Tele: AFIB/ V- paced with heart rate 60- 70's occasional PVC          No further episode of VT          Continue with beta blocker          Maintain K+ > 4.0 and Mg++ > 2.0

## 2017-08-18 NOTE — PROVIDER CONTACT NOTE (OTHER) - RECOMMENDATIONS
supplement phos? will continue to monitor
BRIONNA Pelayo aware, bedside echo to evaluate fluid volume status.
BRIONNA Pelayo aware, call IR to clarify. Change bag to NS.
MD Burger aware and at bedside. ENT to scope to assess for vocal cord paralysis.
possible use of vasopressors,

## 2017-08-18 NOTE — PROGRESS NOTE ADULT - PROBLEM SELECTOR PLAN 3
stable, creatinine decreased somewhat. Improved somewhat, patient able to answer simple questions with one word answers, nods, hand squeeze.

## 2017-08-19 RX ADMIN — Medication 2: at 13:21

## 2017-08-19 RX ADMIN — Medication 20 MILLIEQUIVALENT(S): at 05:27

## 2017-08-19 RX ADMIN — Medication 2: at 01:14

## 2017-08-19 RX ADMIN — Medication 81 MILLIGRAM(S): at 11:44

## 2017-08-19 RX ADMIN — Medication 100 MILLIGRAM(S): at 05:27

## 2017-08-19 RX ADMIN — HYDROMORPHONE HYDROCHLORIDE 0.2 MILLIGRAM(S): 2 INJECTION INTRAMUSCULAR; INTRAVENOUS; SUBCUTANEOUS at 17:13

## 2017-08-19 RX ADMIN — PANTOPRAZOLE SODIUM 40 MILLIGRAM(S): 20 TABLET, DELAYED RELEASE ORAL at 11:44

## 2017-08-19 RX ADMIN — Medication 40 MILLIGRAM(S): at 05:27

## 2017-08-19 RX ADMIN — Medication 40 MILLIGRAM(S): at 17:13

## 2017-08-19 RX ADMIN — HYDROMORPHONE HYDROCHLORIDE 0.2 MILLIGRAM(S): 2 INJECTION INTRAMUSCULAR; INTRAVENOUS; SUBCUTANEOUS at 11:44

## 2017-08-19 RX ADMIN — HYDROMORPHONE HYDROCHLORIDE 0.2 MILLIGRAM(S): 2 INJECTION INTRAMUSCULAR; INTRAVENOUS; SUBCUTANEOUS at 12:05

## 2017-08-19 RX ADMIN — HYDROMORPHONE HYDROCHLORIDE 0.2 MILLIGRAM(S): 2 INJECTION INTRAMUSCULAR; INTRAVENOUS; SUBCUTANEOUS at 17:28

## 2017-08-19 RX ADMIN — SPIRONOLACTONE 50 MILLIGRAM(S): 25 TABLET, FILM COATED ORAL at 05:27

## 2017-08-19 RX ADMIN — Medication 100 MILLIGRAM(S): at 17:13

## 2017-08-19 RX ADMIN — ENOXAPARIN SODIUM 40 MILLIGRAM(S): 100 INJECTION SUBCUTANEOUS at 17:13

## 2017-08-19 RX ADMIN — Medication 20 MILLIEQUIVALENT(S): at 17:13

## 2017-08-19 NOTE — PROGRESS NOTE ADULT - SUBJECTIVE AND OBJECTIVE BOX
STATUS POST: n/a      POST OPERATIVE DAY #: n/a    HD#12    Vital Signs Last 24 Hrs  T(C): 36.4 (19 Aug 2017 04:29), Max: 36.6 (18 Aug 2017 12:45)  T(F): 97.6 (19 Aug 2017 04:29), Max: 97.8 (18 Aug 2017 12:45)  HR: 77 (19 Aug 2017 04:29) (62 - 83)  BP: 121/65 (19 Aug 2017 04:29) (118/56 - 129/77)  BP(mean): --  RR: 17 (19 Aug 2017 04:29) (17 - 18)  SpO2: 100% (19 Aug 2017 04:29) (96% - 100%)    I&O's Summary    18 Aug 2017 07:01  -  19 Aug 2017 07:00  --------------------------------------------------------  IN: 1570 mL / OUT: 0 mL / NET: 1570 mL        SUBJECTIVE: Pt seen at bedside. AMS but patient alert and able to give one word responses. NO acute overnight events. AVSS. She reports some abdominal pain.       Physical Exam:  General Appearance: Appears well tired, NAD  Neck: Supple  Chest: Equal expansion bilaterally, equal breath sounds  CV: Pulse regular presently  Abdomen: Soft, NTTP, mildly distended  Extremities: Grossly symmetric, SCD's in place     LABS:                        9.8    17.8  )-----------( 212      ( 18 Aug 2017 07:31 )             30.7     08-18    142  |  100  |  72<H>  ----------------------------<  171<H>  4.2   |  32<H>  |  1.20    Ca    8.4      18 Aug 2017 07:31  Phos  2.3     08-18  Mg     1.9     08-18    TPro  6.2  /  Alb  2.6<L>  /  TBili  1.5<H>  /  DBili  0.9<H>  /  AST  17  /  ALT  15  /  AlkPhos  108  08-18    PT/INR - ( 18 Aug 2017 07:31 )   PT: 13.6 sec;   INR: 1.25 ratio         PTT - ( 18 Aug 2017 07:31 )  PTT:29.6 sec      RADIOLOGY & ADDITIONAL STUDIES:   no new studies

## 2017-08-19 NOTE — PROGRESS NOTE ADULT - ASSESSMENT
84 y F HD 11, Post-procedure day 12 s/p IR embolization for hemorrhagic hepatic lesion s/p fall.  -remains lethargic and minimally verbal but more alert than yesterday, neuro appears to be improving; speech and swallow therapy 3 days ago failed; speech therapy needed.  -f/u neuro consult and recs; neuro recommends MRI without contrast, MRA head and neck however patient has AICD and is unable to get MRIs, consider CT head.  -BIPAP PRN for hx resp failure  -continue dvt ppx, trend h/h daily  -PT/rehab planning; palliative care (Dr. Restrepo) spoke to patient's son today, 08/18/18, patient's son is deciding on nursing facility to discharge the patient to with palliative care.  Trial of IV dilaudid 0.2mg prn every four hours ordered by palliative team for abdominal pain/comfort.  Patient's son states that the patient likely would not want artificial feedings and is considering discontinuing tube feeds.  -Discussed plan w/ patient's son- no surgical intervention at this time 84 y F HD 11, Post-procedure day 12 s/p IR embolization for hemorrhagic hepatic lesion s/p fall.  -remains lethargic and minimally verbal but more alert than yesterday, neuro appears to be improving; speech and swallow therapy 3 days ago failed; speech therapy needed.  -f/u neuro consult and recs; neuro recommends MRI without contrast, MRA head and neck however patient has AICD and is unable to get MRIs, consider CT head.  -BIPAP PRN for hx resp failure  -continue dvt ppx, trend h/h daily  -PT/rehab planning; palliative care (Dr. Restrepo) spoke to patient's son today, 08/18/18, patient's son is deciding on nursing facility to discharge the patient to with palliative care.  Trial of IV dilaudid 0.2mg prn every four hours ordered by palliative team for abdominal pain/comfort.  Patient's son states that the patient likely would not want artificial feedings and is considering discontinuing tube feeds.  -Discussed plan w/ patient's son- no surgical intervention at this time  - DNR status

## 2017-08-20 RX ADMIN — Medication 40 MILLIGRAM(S): at 18:15

## 2017-08-20 RX ADMIN — Medication 2: at 13:18

## 2017-08-20 RX ADMIN — Medication 100 MILLIGRAM(S): at 18:18

## 2017-08-20 RX ADMIN — HYDROMORPHONE HYDROCHLORIDE 0.2 MILLIGRAM(S): 2 INJECTION INTRAMUSCULAR; INTRAVENOUS; SUBCUTANEOUS at 01:34

## 2017-08-20 RX ADMIN — Medication 100 MILLIGRAM(S): at 06:35

## 2017-08-20 RX ADMIN — Medication 2: at 06:51

## 2017-08-20 RX ADMIN — HYDROMORPHONE HYDROCHLORIDE 0.2 MILLIGRAM(S): 2 INJECTION INTRAMUSCULAR; INTRAVENOUS; SUBCUTANEOUS at 14:16

## 2017-08-20 RX ADMIN — Medication 20 MILLIEQUIVALENT(S): at 06:35

## 2017-08-20 RX ADMIN — HYDROMORPHONE HYDROCHLORIDE 0.2 MILLIGRAM(S): 2 INJECTION INTRAMUSCULAR; INTRAVENOUS; SUBCUTANEOUS at 18:36

## 2017-08-20 RX ADMIN — HYDROMORPHONE HYDROCHLORIDE 0.2 MILLIGRAM(S): 2 INJECTION INTRAMUSCULAR; INTRAVENOUS; SUBCUTANEOUS at 23:07

## 2017-08-20 RX ADMIN — HYDROMORPHONE HYDROCHLORIDE 0.2 MILLIGRAM(S): 2 INJECTION INTRAMUSCULAR; INTRAVENOUS; SUBCUTANEOUS at 18:21

## 2017-08-20 RX ADMIN — HYDROMORPHONE HYDROCHLORIDE 0.2 MILLIGRAM(S): 2 INJECTION INTRAMUSCULAR; INTRAVENOUS; SUBCUTANEOUS at 15:00

## 2017-08-20 RX ADMIN — Medication 40 MILLIGRAM(S): at 06:35

## 2017-08-20 RX ADMIN — Medication 20 MILLIEQUIVALENT(S): at 18:16

## 2017-08-20 RX ADMIN — HYDROMORPHONE HYDROCHLORIDE 0.2 MILLIGRAM(S): 2 INJECTION INTRAMUSCULAR; INTRAVENOUS; SUBCUTANEOUS at 01:19

## 2017-08-20 RX ADMIN — ENOXAPARIN SODIUM 40 MILLIGRAM(S): 100 INJECTION SUBCUTANEOUS at 18:14

## 2017-08-20 RX ADMIN — Medication 1 PACKET(S): at 13:09

## 2017-08-20 RX ADMIN — Medication 2: at 01:18

## 2017-08-20 RX ADMIN — Medication 2: at 18:14

## 2017-08-20 RX ADMIN — SPIRONOLACTONE 50 MILLIGRAM(S): 25 TABLET, FILM COATED ORAL at 06:35

## 2017-08-20 RX ADMIN — Medication 81 MILLIGRAM(S): at 13:10

## 2017-08-20 RX ADMIN — HYDROMORPHONE HYDROCHLORIDE 0.2 MILLIGRAM(S): 2 INJECTION INTRAMUSCULAR; INTRAVENOUS; SUBCUTANEOUS at 22:52

## 2017-08-20 RX ADMIN — PANTOPRAZOLE SODIUM 40 MILLIGRAM(S): 20 TABLET, DELAYED RELEASE ORAL at 13:10

## 2017-08-20 NOTE — PROGRESS NOTE ADULT - NSHPATTENDINGPLANDISCUSS_GEN_ALL_CORE
Resident
Dr. Fong of Surgery
Resident
ACS
[facility] Surgery Team
Dr. Wood ([facility] Surgery)
Surgical resident.

## 2017-08-20 NOTE — PROGRESS NOTE ADULT - SUBJECTIVE AND OBJECTIVE BOX
INTERVAL HISTORY: still nonverbal    TELEMETRY: af  	  MEDICATIONS:  spironolactone 50 milliGRAM(s) Oral every 24 hours  metoprolol 100 milliGRAM(s) Oral every 12 hours  furosemide    Tablet 40 milliGRAM(s) Oral every 12 hours        PHYSICAL EXAM:  T(C): 36.7 (08-20-17 @ 21:00), Max: 36.9 (08-20-17 @ 04:19)  HR: 98 (08-20-17 @ 21:00) (80 - 98)  BP: 144/84 (08-20-17 @ 21:00) (130/70 - 144/84)  RR: 17 (08-20-17 @ 21:00) (17 - 18)  SpO2: 97% (08-20-17 @ 21:00) (96% - 100%)  Wt(kg): --  I&O's Summary    20 Aug 2017 07:01  -  20 Aug 2017 23:40  --------------------------------------------------------  IN: 770 mL / OUT: 0 mL / NET: 770 mL          Appearance: Normal	  HEENT:    PERRL, EOMI	  Lymphatic: No lymphadenopathy  Cardiovascular: Normal S1 S2, No JVD  Respiratory: Lungs clear to auscultation	  Psychiatry: Alert, Mood & affect appropriate  Gastrointestinal:  Soft, Non-tender, + BS	  Skin: No rashes, No cyanosis  Neurologic: Non-focal  Extremities:  No  cyanosis or edema  Vascular: Peripheral pulses palpable  bilaterally      CARDIAC MARKERS:        ASSESSMENT/PLAN: 	  84F w/hx of Afib on Xarelto, MitraClip, sHF, presented to ED w/SOB and abdominal pain after multiple falls. Found to have hemorrhagic hepatic lesion with hemorrhagic shock now  s/p IR embolization of hemorrhagic hepatic lesion (08/08/17)  Patient remains nonverbal (since admission) but follows commands (squeezes fingers, moves legs).   I was consulted for VT with ICD shock. She is off AC given life threatening bleed.     1. VT in the setting of significant hypokalemia s/p ICD shock. No further episodes of sustained VT.   - increased metoprolol dose  - EP follow up appreciated      2. AF - rate control with Metoprolol, 50mg q8. Off AC as life threatening bleed recently   - will consider restarting prior to discharge if deemed safe by surgery    3. sHF- diurese to keep net negative. Agree with Lasix 40mg PO BID for now  - Continue with Aldactone for both sHF and hypokalemia. Monitor ins/outs and kidney function  - continue with Metoprolol, ASA  - Euvolemic    4. HTN - Well controlled on current regimen    5. Neuro - pt nonverbal still. Unclear Etiology. Neuro consult noted, MRI discontinued as ICD not device compatible    6 Pall care follow up noted          Patel Amaya DO [facility]  Cardiovascular Medicine  [phone]

## 2017-08-20 NOTE — PROGRESS NOTE ADULT - SUBJECTIVE AND OBJECTIVE BOX
Surgery Blue Team Progress Note:    Hospital Day 13 Post-Procedure Day 12 s/p IR embolization of hemorrhagic hepatic mass 2/2 fall.    Subject:  Patient examined at bedside.  No acute overnight events.  She is nonverbal this morning.    Objective:  Vitals:  ICU Vital Signs Last 24 Hrs  T(C): 36.9 (20 Aug 2017 04:19), Max: 36.9 (19 Aug 2017 21:07)  T(F): 98.5 (20 Aug 2017 04:19), Max: 98.5 (19 Aug 2017 21:07)  HR: 91 (20 Aug 2017 04:19) (79 - 91)  BP: 142/76 (20 Aug 2017 04:19) (125/82 - 142/76)  RR: 18 (20 Aug 2017 04:19) (16 - 18)  SpO2: 96% (20 Aug 2017 04:19) (96% - 100%)    Labs: No labs being drawn Surgery Blue Team Progress Note:    Hospital Day 13 Post-Procedure Day 12 s/p IR embolization of hemorrhagic hepatic mass 2/2 fall.    Subject:  Patient examined at bedside.  No acute overnight events.  She is nonverbal this morning.    Objective:  Vitals:  ICU Vital Signs Last 24 Hrs  T(C): 36.9 (20 Aug 2017 04:19), Max: 36.9 (19 Aug 2017 21:07)  T(F): 98.5 (20 Aug 2017 04:19), Max: 98.5 (19 Aug 2017 21:07)  HR: 91 (20 Aug 2017 04:19) (79 - 91)  BP: 142/76 (20 Aug 2017 04:19) (125/82 - 142/76)  RR: 18 (20 Aug 2017 04:19) (16 - 18)  SpO2: 96% (20 Aug 2017 04:19) (96% - 100%)    Labs: No labs being drawn    Physical Exam:  General: NAD, lethargic, nonverbal, appears to have residual of vomitus around mouth.  Respiratory: nonlabored breathing, has keotube feed intact (55ml/hr)  Abdomen: soft, nt, nd    Medications:  MEDICATIONS  (STANDING):  insulin lispro (HumaLOG) corrective regimen sliding scale   SubCutaneous every 6 hours  aspirin  chewable 81 milliGRAM(s) Oral daily  pantoprazole   Suspension 40 milliGRAM(s) Oral daily  potassium chloride   Solution 20 milliEquivalent(s) Oral every 12 hours  spironolactone 50 milliGRAM(s) Oral every 24 hours  enoxaparin Injectable 40 milliGRAM(s) SubCutaneous every 24 hours  psyllium Powder 1 Packet(s) Oral every 8 hours  metoprolol 100 milliGRAM(s) Oral every 12 hours  furosemide    Tablet 40 milliGRAM(s) Oral every 12 hours    MEDICATIONS  (PRN):  loperamide Solution 2 milliGRAM(s) Oral every 1 hour PRN Diarrhea  HYDROmorphone  Injectable 0.2 milliGRAM(s) IV Push every 4 hours PRN Severe Pain (7 - 10)

## 2017-08-20 NOTE — PROGRESS NOTE ADULT - SUBJECTIVE AND OBJECTIVE BOX
s/p IR embolization of hepatic lesion    Hemodynamically stable            Objective:    MEDICATIONS  (STANDING):  insulin lispro (HumaLOG) corrective regimen sliding scale   SubCutaneous every 6 hours  aspirin  chewable 81 milliGRAM(s) Oral daily  pantoprazole   Suspension 40 milliGRAM(s) Oral daily  potassium chloride   Solution 20 milliEquivalent(s) Oral every 12 hours  spironolactone 50 milliGRAM(s) Oral every 24 hours  enoxaparin Injectable 40 milliGRAM(s) SubCutaneous every 24 hours  psyllium Powder 1 Packet(s) Oral every 8 hours  metoprolol 100 milliGRAM(s) Oral every 12 hours  furosemide    Tablet 40 milliGRAM(s) Oral every 12 hours    MEDICATIONS  (PRN):  loperamide Solution 2 milliGRAM(s) Oral every 1 hour PRN Diarrhea  HYDROmorphone  Injectable 0.2 milliGRAM(s) IV Push every 4 hours PRN Severe Pain (7 - 10)      Vital Signs Last 24 Hrs  T(C): 36.9 (20 Aug 2017 04:19), Max: 36.9 (19 Aug 2017 21:07)  T(F): 98.5 (20 Aug 2017 04:19), Max: 98.5 (19 Aug 2017 21:07)  HR: 91 (20 Aug 2017 04:19) (79 - 91)  BP: 142/76 (20 Aug 2017 04:19) (125/82 - 142/76)  BP(mean): --  RR: 18 (20 Aug 2017 04:19) (16 - 18)  SpO2: 96% (20 Aug 2017 04:19) (96% - 100%)    I&O's Detail      Daily     Daily Weight in k (20 Aug 2017 04:19)    LABS:                RADIOLOGY & ADDITIONAL STUDIES:

## 2017-08-20 NOTE — PROGRESS NOTE ADULT - ASSESSMENT
84y F s/p IR embolization of hemorrhagic hepatic mass 2/2 fall.  -AVSS, waxing and waning AMS, was verbal yesterday but nonverbal this morning during exam  -Nonsurgical candidate; Continue palliative care; will d/c to nursing facility likely this week  -Continue pain control per palliative care recommendations

## 2017-08-21 ENCOUNTER — TRANSCRIPTION ENCOUNTER (OUTPATIENT)
Age: 82
End: 2017-08-21

## 2017-08-21 VITALS
SYSTOLIC BLOOD PRESSURE: 125 MMHG | DIASTOLIC BLOOD PRESSURE: 64 MMHG | OXYGEN SATURATION: 100 % | TEMPERATURE: 98 F | RESPIRATION RATE: 19 BRPM | HEART RATE: 80 BPM

## 2017-08-21 DIAGNOSIS — R06.00 DYSPNEA, UNSPECIFIED: ICD-10-CM

## 2017-08-21 LAB
ALBUMIN SERPL ELPH-MCNC: 2.7 G/DL — LOW (ref 3.3–5)
ALP SERPL-CCNC: 107 U/L — SIGNIFICANT CHANGE UP (ref 40–120)
ALT FLD-CCNC: 16 U/L RC — SIGNIFICANT CHANGE UP (ref 10–45)
ANION GAP SERPL CALC-SCNC: 12 MMOL/L — SIGNIFICANT CHANGE UP (ref 5–17)
APTT BLD: 29.8 SEC — SIGNIFICANT CHANGE UP (ref 27.5–37.4)
AST SERPL-CCNC: 22 U/L — SIGNIFICANT CHANGE UP (ref 10–40)
BILIRUB SERPL-MCNC: 1.5 MG/DL — HIGH (ref 0.2–1.2)
BUN SERPL-MCNC: 71 MG/DL — HIGH (ref 7–23)
CALCIUM SERPL-MCNC: 8.7 MG/DL — SIGNIFICANT CHANGE UP (ref 8.4–10.5)
CHLORIDE SERPL-SCNC: 102 MMOL/L — SIGNIFICANT CHANGE UP (ref 96–108)
CO2 SERPL-SCNC: 36 MMOL/L — HIGH (ref 22–31)
CREAT SERPL-MCNC: 1.05 MG/DL — SIGNIFICANT CHANGE UP (ref 0.5–1.3)
GLUCOSE SERPL-MCNC: 195 MG/DL — HIGH (ref 70–99)
HCT VFR BLD CALC: 30.8 % — LOW (ref 34.5–45)
HGB BLD-MCNC: 9.9 G/DL — LOW (ref 11.5–15.5)
INR BLD: 1.29 RATIO — HIGH (ref 0.88–1.16)
MAGNESIUM SERPL-MCNC: 1.7 MG/DL — SIGNIFICANT CHANGE UP (ref 1.6–2.6)
MCHC RBC-ENTMCNC: 29.3 PG — SIGNIFICANT CHANGE UP (ref 27–34)
MCHC RBC-ENTMCNC: 32.1 GM/DL — SIGNIFICANT CHANGE UP (ref 32–36)
MCV RBC AUTO: 91.3 FL — SIGNIFICANT CHANGE UP (ref 80–100)
PHOSPHATE SERPL-MCNC: 2.6 MG/DL — SIGNIFICANT CHANGE UP (ref 2.5–4.5)
PLATELET # BLD AUTO: 290 K/UL — SIGNIFICANT CHANGE UP (ref 150–400)
POTASSIUM SERPL-MCNC: 4.4 MMOL/L — SIGNIFICANT CHANGE UP (ref 3.5–5.3)
POTASSIUM SERPL-SCNC: 4.4 MMOL/L — SIGNIFICANT CHANGE UP (ref 3.5–5.3)
PROT SERPL-MCNC: 6.7 G/DL — SIGNIFICANT CHANGE UP (ref 6–8.3)
PROTHROM AB SERPL-ACNC: 14.1 SEC — HIGH (ref 9.8–12.7)
RBC # BLD: 3.37 M/UL — LOW (ref 3.8–5.2)
RBC # FLD: 15.4 % — HIGH (ref 10.3–14.5)
SODIUM SERPL-SCNC: 150 MMOL/L — HIGH (ref 135–145)
WBC # BLD: 19.1 K/UL — HIGH (ref 3.8–10.5)
WBC # FLD AUTO: 19.1 K/UL — HIGH (ref 3.8–10.5)

## 2017-08-21 PROCEDURE — 84484 ASSAY OF TROPONIN QUANT: CPT

## 2017-08-21 PROCEDURE — P9017: CPT

## 2017-08-21 PROCEDURE — 85014 HEMATOCRIT: CPT

## 2017-08-21 PROCEDURE — P9037: CPT

## 2017-08-21 PROCEDURE — 96375 TX/PRO/DX INJ NEW DRUG ADDON: CPT | Mod: XU

## 2017-08-21 PROCEDURE — P9059: CPT

## 2017-08-21 PROCEDURE — 74018 RADEX ABDOMEN 1 VIEW: CPT

## 2017-08-21 PROCEDURE — 37243 VASC EMBOLIZE/OCCLUDE ORGAN: CPT

## 2017-08-21 PROCEDURE — 85610 PROTHROMBIN TIME: CPT

## 2017-08-21 PROCEDURE — 97112 NEUROMUSCULAR REEDUCATION: CPT

## 2017-08-21 PROCEDURE — 84480 ASSAY TRIIODOTHYRONINE (T3): CPT

## 2017-08-21 PROCEDURE — 80053 COMPREHEN METABOLIC PANEL: CPT

## 2017-08-21 PROCEDURE — 80076 HEPATIC FUNCTION PANEL: CPT

## 2017-08-21 PROCEDURE — 93971 EXTREMITY STUDY: CPT

## 2017-08-21 PROCEDURE — 94660 CPAP INITIATION&MGMT: CPT

## 2017-08-21 PROCEDURE — 87040 BLOOD CULTURE FOR BACTERIA: CPT

## 2017-08-21 PROCEDURE — 84443 ASSAY THYROID STIM HORMONE: CPT

## 2017-08-21 PROCEDURE — 82140 ASSAY OF AMMONIA: CPT

## 2017-08-21 PROCEDURE — 82947 ASSAY GLUCOSE BLOOD QUANT: CPT

## 2017-08-21 PROCEDURE — C1769: CPT

## 2017-08-21 PROCEDURE — 94002 VENT MGMT INPAT INIT DAY: CPT

## 2017-08-21 PROCEDURE — 70450 CT HEAD/BRAIN W/O DYE: CPT

## 2017-08-21 PROCEDURE — 82105 ALPHA-FETOPROTEIN SERUM: CPT

## 2017-08-21 PROCEDURE — 99291 CRITICAL CARE FIRST HOUR: CPT | Mod: 25

## 2017-08-21 PROCEDURE — 74174 CTA ABD&PLVS W/CONTRAST: CPT

## 2017-08-21 PROCEDURE — 99292 CRITICAL CARE ADDL 30 MIN: CPT | Mod: 25

## 2017-08-21 PROCEDURE — 82565 ASSAY OF CREATININE: CPT

## 2017-08-21 PROCEDURE — 71045 X-RAY EXAM CHEST 1 VIEW: CPT

## 2017-08-21 PROCEDURE — 96374 THER/PROPH/DIAG INJ IV PUSH: CPT | Mod: XU

## 2017-08-21 PROCEDURE — P9011: CPT

## 2017-08-21 PROCEDURE — 97110 THERAPEUTIC EXERCISES: CPT

## 2017-08-21 PROCEDURE — C1887: CPT

## 2017-08-21 PROCEDURE — 83880 ASSAY OF NATRIURETIC PEPTIDE: CPT

## 2017-08-21 PROCEDURE — 93005 ELECTROCARDIOGRAM TRACING: CPT

## 2017-08-21 PROCEDURE — 81001 URINALYSIS AUTO W/SCOPE: CPT

## 2017-08-21 PROCEDURE — 85730 THROMBOPLASTIN TIME PARTIAL: CPT

## 2017-08-21 PROCEDURE — 83605 ASSAY OF LACTIC ACID: CPT

## 2017-08-21 PROCEDURE — 36247 INS CATH ABD/L-EXT ART 3RD: CPT

## 2017-08-21 PROCEDURE — 80074 ACUTE HEPATITIS PANEL: CPT

## 2017-08-21 PROCEDURE — 84100 ASSAY OF PHOSPHORUS: CPT

## 2017-08-21 PROCEDURE — 85384 FIBRINOGEN ACTIVITY: CPT

## 2017-08-21 PROCEDURE — 83690 ASSAY OF LIPASE: CPT

## 2017-08-21 PROCEDURE — 82803 BLOOD GASES ANY COMBINATION: CPT

## 2017-08-21 PROCEDURE — 82248 BILIRUBIN DIRECT: CPT

## 2017-08-21 PROCEDURE — 82553 CREATINE MB FRACTION: CPT

## 2017-08-21 PROCEDURE — 36248 INS CATH ABD/L-EXT ART ADDL: CPT | Mod: 59

## 2017-08-21 PROCEDURE — 97530 THERAPEUTIC ACTIVITIES: CPT

## 2017-08-21 PROCEDURE — 82330 ASSAY OF CALCIUM: CPT

## 2017-08-21 PROCEDURE — C1889: CPT

## 2017-08-21 PROCEDURE — 36430 TRANSFUSION BLD/BLD COMPNT: CPT

## 2017-08-21 PROCEDURE — 80048 BASIC METABOLIC PNL TOTAL CA: CPT

## 2017-08-21 PROCEDURE — 84295 ASSAY OF SERUM SODIUM: CPT

## 2017-08-21 PROCEDURE — 84436 ASSAY OF TOTAL THYROXINE: CPT

## 2017-08-21 PROCEDURE — C1894: CPT

## 2017-08-21 PROCEDURE — 71275 CT ANGIOGRAPHY CHEST: CPT

## 2017-08-21 PROCEDURE — 83735 ASSAY OF MAGNESIUM: CPT

## 2017-08-21 PROCEDURE — 82570 ASSAY OF URINE CREATININE: CPT

## 2017-08-21 PROCEDURE — 82272 OCCULT BLD FECES 1-3 TESTS: CPT

## 2017-08-21 PROCEDURE — 86900 BLOOD TYPING SEROLOGIC ABO: CPT

## 2017-08-21 PROCEDURE — 83935 ASSAY OF URINE OSMOLALITY: CPT

## 2017-08-21 PROCEDURE — 85027 COMPLETE CBC AUTOMATED: CPT

## 2017-08-21 PROCEDURE — C8929: CPT

## 2017-08-21 PROCEDURE — 84540 ASSAY OF URINE/UREA-N: CPT

## 2017-08-21 PROCEDURE — 84300 ASSAY OF URINE SODIUM: CPT

## 2017-08-21 PROCEDURE — 97162 PT EVAL MOD COMPLEX 30 MIN: CPT

## 2017-08-21 PROCEDURE — 86850 RBC ANTIBODY SCREEN: CPT

## 2017-08-21 PROCEDURE — 84132 ASSAY OF SERUM POTASSIUM: CPT

## 2017-08-21 PROCEDURE — 76937 US GUIDE VASCULAR ACCESS: CPT

## 2017-08-21 PROCEDURE — 82435 ASSAY OF BLOOD CHLORIDE: CPT

## 2017-08-21 PROCEDURE — 86901 BLOOD TYPING SEROLOGIC RH(D): CPT

## 2017-08-21 PROCEDURE — 87205 SMEAR GRAM STAIN: CPT

## 2017-08-21 PROCEDURE — 82550 ASSAY OF CK (CPK): CPT

## 2017-08-21 PROCEDURE — 94003 VENT MGMT INPAT SUBQ DAY: CPT

## 2017-08-21 RX ORDER — FUROSEMIDE 40 MG
40 TABLET ORAL
Qty: 0 | Refills: 0 | COMMUNITY

## 2017-08-21 RX ORDER — SENNA PLUS 8.6 MG/1
1 TABLET ORAL
Qty: 0 | Refills: 0 | COMMUNITY

## 2017-08-21 RX ORDER — ASPIRIN/CALCIUM CARB/MAGNESIUM 324 MG
1 TABLET ORAL
Qty: 0 | Refills: 0 | COMMUNITY

## 2017-08-21 RX ORDER — METOPROLOL TARTRATE 50 MG
1 TABLET ORAL
Qty: 0 | Refills: 0 | COMMUNITY

## 2017-08-21 RX ORDER — POTASSIUM CHLORIDE 20 MEQ
1 PACKET (EA) ORAL
Qty: 0 | Refills: 0 | COMMUNITY

## 2017-08-21 RX ORDER — HYDROMORPHONE HYDROCHLORIDE 2 MG/ML
0.2 INJECTION INTRAMUSCULAR; INTRAVENOUS; SUBCUTANEOUS
Qty: 0 | Refills: 0 | COMMUNITY
Start: 2017-08-21

## 2017-08-21 RX ORDER — PANTOPRAZOLE SODIUM 20 MG/1
1 TABLET, DELAYED RELEASE ORAL
Qty: 0 | Refills: 0 | COMMUNITY

## 2017-08-21 RX ORDER — HYDROMORPHONE HYDROCHLORIDE 2 MG/ML
0.2 INJECTION INTRAMUSCULAR; INTRAVENOUS; SUBCUTANEOUS EVERY 6 HOURS
Qty: 0 | Refills: 0 | Status: DISCONTINUED | OUTPATIENT
Start: 2017-08-21 | End: 2017-08-21

## 2017-08-21 RX ORDER — OMEGA-3 ACID ETHYL ESTERS 1 G
1 CAPSULE ORAL
Qty: 0 | Refills: 0 | COMMUNITY

## 2017-08-21 RX ORDER — MULTIVIT-MIN/FERROUS GLUCONATE 9 MG/15 ML
1 LIQUID (ML) ORAL
Qty: 0 | Refills: 0 | COMMUNITY

## 2017-08-21 RX ORDER — PANTOPRAZOLE SODIUM 20 MG/1
0 TABLET, DELAYED RELEASE ORAL
Qty: 0 | Refills: 0 | COMMUNITY

## 2017-08-21 RX ORDER — HYDROMORPHONE HYDROCHLORIDE 2 MG/ML
0.2 INJECTION INTRAMUSCULAR; INTRAVENOUS; SUBCUTANEOUS
Qty: 0 | Refills: 0 | Status: DISCONTINUED | OUTPATIENT
Start: 2017-08-21 | End: 2017-08-21

## 2017-08-21 RX ORDER — FONDAPARINUX SODIUM 2.5 MG/.5ML
1 INJECTION, SOLUTION SUBCUTANEOUS
Qty: 0 | Refills: 0 | COMMUNITY

## 2017-08-21 RX ORDER — DOCUSATE SODIUM 100 MG
1 CAPSULE ORAL
Qty: 0 | Refills: 0 | COMMUNITY

## 2017-08-21 RX ADMIN — HYDROMORPHONE HYDROCHLORIDE 0.2 MILLIGRAM(S): 2 INJECTION INTRAMUSCULAR; INTRAVENOUS; SUBCUTANEOUS at 15:00

## 2017-08-21 RX ADMIN — Medication 40 MILLIGRAM(S): at 05:39

## 2017-08-21 RX ADMIN — SPIRONOLACTONE 50 MILLIGRAM(S): 25 TABLET, FILM COATED ORAL at 05:39

## 2017-08-21 RX ADMIN — HYDROMORPHONE HYDROCHLORIDE 0.2 MILLIGRAM(S): 2 INJECTION INTRAMUSCULAR; INTRAVENOUS; SUBCUTANEOUS at 17:58

## 2017-08-21 RX ADMIN — HYDROMORPHONE HYDROCHLORIDE 0.2 MILLIGRAM(S): 2 INJECTION INTRAMUSCULAR; INTRAVENOUS; SUBCUTANEOUS at 18:14

## 2017-08-21 RX ADMIN — HYDROMORPHONE HYDROCHLORIDE 0.2 MILLIGRAM(S): 2 INJECTION INTRAMUSCULAR; INTRAVENOUS; SUBCUTANEOUS at 05:54

## 2017-08-21 RX ADMIN — HYDROMORPHONE HYDROCHLORIDE 0.2 MILLIGRAM(S): 2 INJECTION INTRAMUSCULAR; INTRAVENOUS; SUBCUTANEOUS at 10:37

## 2017-08-21 RX ADMIN — ENOXAPARIN SODIUM 40 MILLIGRAM(S): 100 INJECTION SUBCUTANEOUS at 18:14

## 2017-08-21 RX ADMIN — Medication 81 MILLIGRAM(S): at 12:47

## 2017-08-21 RX ADMIN — Medication 20 MILLIEQUIVALENT(S): at 05:39

## 2017-08-21 RX ADMIN — PANTOPRAZOLE SODIUM 40 MILLIGRAM(S): 20 TABLET, DELAYED RELEASE ORAL at 12:47

## 2017-08-21 RX ADMIN — HYDROMORPHONE HYDROCHLORIDE 0.2 MILLIGRAM(S): 2 INJECTION INTRAMUSCULAR; INTRAVENOUS; SUBCUTANEOUS at 11:00

## 2017-08-21 RX ADMIN — Medication 2: at 00:35

## 2017-08-21 RX ADMIN — HYDROMORPHONE HYDROCHLORIDE 0.2 MILLIGRAM(S): 2 INJECTION INTRAMUSCULAR; INTRAVENOUS; SUBCUTANEOUS at 05:39

## 2017-08-21 RX ADMIN — HYDROMORPHONE HYDROCHLORIDE 0.2 MILLIGRAM(S): 2 INJECTION INTRAMUSCULAR; INTRAVENOUS; SUBCUTANEOUS at 14:30

## 2017-08-21 RX ADMIN — Medication 100 MILLIGRAM(S): at 05:42

## 2017-08-21 RX ADMIN — Medication 4: at 06:28

## 2017-08-21 NOTE — PROGRESS NOTE ADULT - SUBJECTIVE AND OBJECTIVE BOX
INTERVAL HPI/OVERNIGHT EVENTS: Pt remains non-verbal today. Does not shake or nod head when questioned. She appears comfortable. No facial grimacing or moaning. +mild tachypnea.      Allergies    No Known Allergies    Intolerances      ADVANCE DIRECTIVES:    DNR   MOLST [x ] YES [ ] NO  completed today.    MEDICATIONS  (STANDING):  insulin lispro (HumaLOG) corrective regimen sliding scale   SubCutaneous every 6 hours  aspirin  chewable 81 milliGRAM(s) Oral daily  pantoprazole   Suspension 40 milliGRAM(s) Oral daily  potassium chloride   Solution 20 milliEquivalent(s) Oral every 12 hours  spironolactone 50 milliGRAM(s) Oral every 24 hours  enoxaparin Injectable 40 milliGRAM(s) SubCutaneous every 24 hours  psyllium Powder 1 Packet(s) Oral every 8 hours  metoprolol 100 milliGRAM(s) Oral every 12 hours  furosemide    Tablet 40 milliGRAM(s) Oral every 12 hours  HYDROmorphone  Injectable 0.2 milliGRAM(s) IV Push every 6 hours    MEDICATIONS  (PRN):  loperamide Solution 2 milliGRAM(s) Oral every 1 hour PRN Diarrhea  HYDROmorphone  Injectable 0.2 milliGRAM(s) IV Push every 2 hours PRN dyspnea      PRESENT SYMPTOMS:  Source: [x ] Patient   [ ] Family   [ ] Team     Pain:                        [x] No [x] Yes             [ ] Mild [ ] Moderate [ ] Severe    Dyspnea:                [ ] No [x] Yes             [ ] Mild [ ] Moderate [ ] Severe    Anxiety:                  [x] No [ ] Yes             [ ] Mild [ ] Moderate [ ] Severe    Fatigue:                  [ ] No [x] Yes             [ ] Mild [ ] Moderate [x ] Severe    Nausea:                  [x] No [ ] Yes             [ ] Mild [ ] Moderate [ ] Severe    Loss of appetite:   [ ] No [x] Yes             [ ] Mild [ ] Moderate [x ] Severe    Constipation:        [x] No [ ] Yes             [ ] Mild [ ] Moderate [ ] Severe    Other Symptoms:  [ ] All other review of systems negative   [ x] Unable to obtain due to poor mentation     Karnofsky Performance Score/Palliative Performance Status Version 2:  10       %    PHYSICAL EXAM:  Vital Signs Last 24 Hrs  T(C): 37.1 (21 Aug 2017 12:30), Max: 37.2 (21 Aug 2017 04:19)  T(F): 98.7 (21 Aug 2017 12:30), Max: 98.9 (21 Aug 2017 04:19)  HR: 75 (21 Aug 2017 12:30) (75 - 98)  BP: 128/73 (21 Aug 2017 12:30) (128/73 - 155/77)  BP(mean): --  RR: 17 (21 Aug 2017 12:30) (17 - 18)  SpO2: 97% (21 Aug 2017 12:30) (96% - 99%)      General:  [ ] Alert  [ ] Oriented x      [x] Lethargic  [ ] Agitated   [ ] Cachexia   [ ] Unarousable  [x] Verbal minimal  [ ] Non-Verbal    HEENT:  [ ] Normal   [x] Dry mouth   [ ] ET Tube    [ ] Trach  [ ] Oral lesions    Lungs:   [x] Clear [ ] Tachypnea  [x ] Audible excessive secretions upper airway  [ ] Rhonchi        [ ] Right [ ] Left [ ] Bilateral  [ ] Crackles        [ ] Right [ ] Left [ ] Bilateral  [ ] Wheezing     [ ] Right [ ] Left [ ] Bilateral    Cardiovascular:  [x ] Regular [ ] Irregular [ ] Tachycardia   [ ] Bradycardia  [ ] Murmur [ ] Other +S1 +S2     Abdomen: [x ] Soft  [x ] Distended   [x ] +BS  [ ] Non tender [x ] Tender  ruq and epigastric area [ ]PEG   [ x]OGT/ NGT   Last BM:       Genitourinary: [ ] Normal [x ] Incontinent   [ ] Oliguria/Anuria   [ ] Reyez    Musculoskeletal:  [ ] Normal   [x ] Weakness  [x ] Bedbound/Wheelchair bound [ ] Edema    Neurological: [x ] No focal deficits  [ ] Cognitive impairment  [ x] Dysphagia [ ] Dysarthria [ ] Paresis [ ] Other     Skin: [x ] Normal   [ ] Pressure ulcer(s)                  [ ] Rash    LABS:                        9.8    17.8  )-----------( 212      ( 18 Aug 2017 07:31 )             30.7     08-18    142  |  100  |  72<H>  ----------------------------<  171<H>  4.2   |  32<H>  |  1.20    Ca    8.4      18 Aug 2017 07:31  Phos  2.3     08-18  Mg     1.9     08-18    TPro  6.2  /  Alb  2.6<L>  /  TBili  1.5<H>  /  DBili  0.9<H>  /  AST  17  /  ALT  15  /  AlkPhos  108  08-18    PT/INR - ( 18 Aug 2017 07:31 )   PT: 13.6 sec;   INR: 1.25 ratio         PTT - ( 18 Aug 2017 07:31 )  PTT:29.6 sec      Shock: [ ] Septic [ ] Cardiogenic [ ] Neurologic [ ] Hypovolemic  Vasopressors x   Inotropes x     Oral Intake: [ ] Unable/mouth care only [ ] Minimal [ ] Moderate [ ] Full Capability    Diet: [ ] NPO [ ] Tube feeds [ ] TPN [ ] Other     RADIOLOGY & ADDITIONAL STUDIES:    REFERRALS:   [x ] Chaplaincy  [x ] Hospice  [ ] Child Life  [ ] Social Work  [ ] Case management [ ] Holistic Therapy

## 2017-08-21 NOTE — DISCHARGE NOTE ADULT - PLAN OF CARE
comfort care [facility], dilaudid 0.2mg IV every 6 hours around the clock and dilaudid 0.2 IV every 2 hours as needed for difficulty breathing/pain

## 2017-08-21 NOTE — DISCHARGE NOTE ADULT - HOSPITAL COURSE
84F w/hx of Afib on Xarelto presented to ED w/SOB and abdominal pain after multiple falls. She reports 2 syncopal episodes yesterday -- during the 2nd one she fell backwards and hit her head. Subsequent to second episode she noted abdominal pain. In the ED a FAST exam demonstrated large amount of intraperitoneal blood; CTA showed confirmed moderate to large amount of hemoperitonuem along with "10.2 cm, complex cyst with internal high attenuation concerning for bleeding. An arterial vessel is noted inside this cystic structure concerning for active bleeding." CT head was also performed and negative for acute injury. A level I trauma activation was called.    On arrival to the ED airway intact, tachycardic to 120s with systolic /80, palpable radial pulses B/L. GCS=15. On secondary survey only significant finding was tense, distended abdomen with diffuse tenderness. A massive transfusion protocol was initiated. Hct = 27, INR = 5.04. She received 2u of PRBC, K-centra, Vitamin K, and 2u of FFP and a L femoral central catheter was placed prior to transfer to Interventional Radiology. She had transient hypotension to 80s systolic after initiation of 1st u PRBCs, increased to 130s systolic with additional PRBCs.    Pt admitted to SICU.    8/9: diuresed with 20 Lasix, Metoprolol changed to 25 q 12hrs, dosed Vit K 10mg for elevated INR. Hct stable --> CBC q 8hrs. Repeat lytes @ 3pm. fluids changed to D51/2NS +20KCl @ 30. ASA restarted  8/11 Lasix 40 BID started for likely volume overload. Trophic tube feeds started via NGT  8/12: Still not mentating well. Will send Ammonia, will repeat CT head. Will diurese  8/13 Neuro called twice, will call again first thing in the morning  8/14 Episode of vtach with firing of AICD in am, started on amio gtt, lasix cut in half, spironolactone started, MRI contraindicated  8/15, if mental status worsens and smmonia >60, start lactulose       Pt seem by palliative decision for comfort care, inpatient hospice. NGT removed, AICD turned off and patient discharged only on pain medication, protonic and lasix as per hospice nurse. 84F w/hx of Afib on Xarelto presented to ED w/SOB and abdominal pain after multiple falls. She reports 2 syncopal episodes yesterday -- during the 2nd one she fell backwards and hit her head. Subsequent to second episode she noted abdominal pain. In the ED a FAST exam demonstrated large amount of intraperitoneal blood; CTA showed confirmed moderate to large amount of hemoperitonuem along with "10.2 cm, complex cyst with internal high attenuation concerning for bleeding. An arterial vessel is noted inside this cystic structure concerning for active bleeding." CT head was also performed and negative for acute injury. A level I trauma activation was called.    On arrival to the ED airway intact, tachycardic to 120s with systolic /80, palpable radial pulses B/L. GCS=15. On secondary survey only significant finding was tense, distended abdomen with diffuse tenderness. A massive transfusion protocol was initiated. Hct = 27, INR = 5.04. She received 2u of PRBC, K-centra, Vitamin K, and 2u of FFP and a L femoral central catheter was placed prior to transfer to Interventional Radiology. She had transient hypotension to 80s systolic after initiation of 1st u PRBCs, increased to 130s systolic with additional PRBCs.    In IR underwent Catheter embolization of hepatic mass for symptomatically hemorrhage    Pt admitted to SICU.    8/9: diuresed with 20 Lasix, Metoprolol changed to 25 q 12hrs, dosed Vit K 10mg for elevated INR. Hct stable --> CBC q 8hrs. Repeat lytes @ 3pm. fluids changed to D51/2NS +20KCl @ 30. ASA restarted  8/11 Lasix 40 BID started for likely volume overload. Trophic tube feeds started via NGT  8/12: Still not mentating well. Will send Ammonia, will repeat CT head. Will diurese  8/13 Neuro called twice, will call again first thing in the morning- recommended repeat head CT- No acute intracranial hemorrhage or mass effect.   Redemonstrated chronic infarcts as described.    8/14 Episode of vtach with firing of AICD in am, started on amio gtt, lasix cut in half, spironolactone started, MRI contraindicated due to AICD    Pt with low arterial po2, her tube feeds were held and she was reinstated on BIPAP at night.      Pt seem by palliative decision for comfort care, DNR/ DNI inpatient hospice. NGT removed, AICD turned off and patient discharged only on pain medication, protonic and lasix as per hospice nurse.

## 2017-08-21 NOTE — PROGRESS NOTE ADULT - SUBJECTIVE AND OBJECTIVE BOX
Patient is a 84 year old female with PMH Afib (was on Xarelto), CAD, s/p mitral clip, CABG x 3 (7/1/2013), Aflutter, HF, EF 35-40%, s/p single chamber ICD (BSC) on 5/23/17 for inducible sustained monomorphic VT, breast CA s/p resection/chemo, HTN, HLD, DM2, p/w SOB and abdominal pain, found to have large intraperitoneal bleed s/p IR embolization of hemorrhagic hepatic lesion on 8/8/17. Course c/b s/p ICD shock on 8/14 for monomorphic VT in the setting of hypokalemia.     Tele: AFIB/ V- paced with heart rate 70- 100's occasional PVC, 4 beats NSVT overnight.          No further episodes of sustained  VT          Continue with beta blocker for controlled ventricular rates.           Maintain K+ > 4.0 and Mg++ > 2.0 and supplement as needed.           Not on anticoagulation due to hx GIB, needs to be cleared by surgery before re-starting.          Dispo: Being followed by palliative care, nursing facility placement.

## 2017-08-21 NOTE — PROGRESS NOTE ADULT - SUBJECTIVE AND OBJECTIVE BOX
Surgery Blue Team Progress Note:    Hospital Day 14 Post-Procedure Day 13 s/p IR embolization of hemorrhagic hepatic mass 2/2 fall.    Subject:  Patient examined at bedside.  No acute overnight events.  She remains nonverbal this morning but communicates by nodding her head (yes/no), and responds to commands (squeezes fingers).  Continues to have incontinent episodes (urine/fecal)  No apparent complaints at this time.    Objective:  Vitals:  ICU Vital Signs Last 24 Hrs  T(C): 37.2 (21 Aug 2017 04:19), Max: 37.2 (21 Aug 2017 04:19)  T(F): 98.9 (21 Aug 2017 04:19), Max: 98.9 (21 Aug 2017 04:19)  HR: 83 (21 Aug 2017 06:18) (80 - 98)  BP: 144/72 (21 Aug 2017 06:18) (134/74 - 155/77)  BP(mean): --  ABP: --  ABP(mean): --  RR: 18 (21 Aug 2017 04:19) (17 - 18)  SpO2: 99% (21 Aug 2017 04:19) (96% - 99%)    Labs:                        9.9    19.1  )-----------( 290      ( 21 Aug 2017 05:54 )             30.8       08-21    150<H>  |  102  |  71<H>  ----------------------------<  195<H>  4.4   |  36<H>  |  1.05    Ca    8.7      21 Aug 2017 05:54  Phos  2.6     08-21  Mg     1.7     08-21    TPro  6.7  /  Alb  2.7<L>  /  TBili  1.5<H>  /  DBili  x   /  AST  22  /  ALT  16  /  AlkPhos  107  08-21      I&O's Detail    20 Aug 2017 07:01  -  21 Aug 2017 07:00  --------------------------------------------------------  IN:    Enteral Tube Flush: 120 mL    Vital HN: 650 mL  Total IN: 770 mL    OUT:  Total OUT: 0 mL    Total NET: 770 mL    Physical Exam:  General: NAD, lethargic, nonverbal, appears to have residual of vomitus around mouth.  Respiratory: nonlabored breathing, has keotube feed intact (55ml/hr)  Abdomen: soft, nt, nd    Medications:  MEDICATIONS  (STANDING):  insulin lispro (HumaLOG) corrective regimen sliding scale   SubCutaneous every 6 hours  aspirin  chewable 81 milliGRAM(s) Oral daily  pantoprazole   Suspension 40 milliGRAM(s) Oral daily  potassium chloride   Solution 20 milliEquivalent(s) Oral every 12 hours  spironolactone 50 milliGRAM(s) Oral every 24 hours  enoxaparin Injectable 40 milliGRAM(s) SubCutaneous every 24 hours  psyllium Powder 1 Packet(s) Oral every 8 hours  metoprolol 100 milliGRAM(s) Oral every 12 hours  furosemide    Tablet 40 milliGRAM(s) Oral every 12 hours    MEDICATIONS  (PRN):  loperamide Solution 2 milliGRAM(s) Oral every 1 hour PRN Diarrhea  HYDROmorphone  Injectable 0.2 milliGRAM(s) IV Push every 4 hours PRN Severe Pain (7 - 10)

## 2017-08-21 NOTE — DISCHARGE NOTE ADULT - MEDICATION SUMMARY - MEDICATIONS TO STOP TAKING
I will STOP taking the medications listed below when I get home from the hospital:    Zetia 10 mg oral tablet  -- 1 tab(s) by mouth once a day (at bedtime)    metoprolol succinate 100 mg oral tablet, extended release  -- 1 tab(s) by mouth once a day    pantoprazole 40 mg oral delayed release tablet  -- 1 tab(s) by mouth once a day    potassium chloride 10 mEq oral tablet, extended release  -- 1 tab(s) by mouth once a day with Lasix (M/W/F)    Xarelto 15 mg oral tablet  -- 1 tab(s) by mouth once a day (in the evening)    Centrum Women's  -- 1 tab(s) by mouth once a day    Colace 100 mg oral capsule  -- 1 cap(s) by mouth once a day    Aspirin Enteric Coated 81 mg oral delayed release tablet  -- 1 tab(s) by mouth once a day    Omega-3 oral capsule  -- 1 cap(s) by mouth once a day    senna oral tablet  -- 1 tab(s) by mouth once a day, As Needed.    hydrALAZINE 50 mg oral tablet  -- 1 tab(s) by mouth 3 times a day MDD:3    lisinopril 5 mg oral tablet  -- 1 tab(s) by mouth once a day MDD:1

## 2017-08-21 NOTE — PROGRESS NOTE ADULT - PROBLEM SELECTOR PLAN 5
It was determined among pt and family to focus on conservative care and pt's comfort. Pt will be discharged to inpatient hospice ([facility]) today.

## 2017-08-21 NOTE — PROGRESS NOTE ADULT - ASSESSMENT
84y F Post Procedure day 13 s/p IR embolization of hemorrhagic hepatic mass 2/2 fall.  -AVSS, waxing and waning AMS, nonverbal this morning during exam but communicates by nodding head and squeezing team member's fingers  -Nonsurgical candidate; Continue palliative care; will d/c to nursing facility likely this week  -Continue pain control per palliative care recommendations 84y F Post Procedure day 13 s/p IR embolization of hemorrhagic hepatic mass 2/2 fall.  -AVSS, waxing and waning AMS, nonverbal this morning during exam but communicates by nodding head and squeezing team member's fingers  -Nonsurgical candidate; Continue palliative care; will d/c to nursing facility likely this week  -Continue pain control per palliative care recommendations, hospice planning  -Patient continues to have episodes of diarrhea/incontinence, consider bowel regimen (imodium)

## 2017-08-21 NOTE — CHART NOTE - NSCHARTNOTEFT_GEN_A_CORE
Source: Patient [ ]    Family [ ]     other [x ]chart, Nurse    Diet : ENTERAL,NPO/ENTERAL  Pt non-verbal  Diarrhea resolved as per Nurse  Followed by Palliative with eventual home hospice.  s/p IR embolization of hemorrhagic hepatic mass 2/2 fall.      Patient reports [ ] nausea  [ ] vomiting [ ] diarrhea [ ] constipation  [ ]chewing problems [ ] swallowing issues  [ ] other:      PO intake:  < 50% [ ] 50-75% [ ]   % [ ]  other :     Source for PO intake [ ] Patient [ ] family [ ] chart [ ] staff [ ] other     Enteral /Parenteral Nutrition: VitalAF @ 55ml/hr x 24 hr. tolerating without residual as per Nursing. Followed by Palliative with eventual home hospice.       Current Weight: 163.8pounds/74.3kg  % Weight Change:4% loss since 8/9    Pertinent Medications: MEDICATIONS  (STANDING):  insulin lispro (HumaLOG) corrective regimen sliding scale   SubCutaneous every 6 hours  aspirin  chewable 81 milliGRAM(s) Oral daily  pantoprazole   Suspension 40 milliGRAM(s) Oral daily  potassium chloride   Solution 20 milliEquivalent(s) Oral every 12 hours  spironolactone 50 milliGRAM(s) Oral every 24 hours  enoxaparin Injectable 40 milliGRAM(s) SubCutaneous every 24 hours  psyllium Powder 1 Packet(s) Oral every 8 hours  metoprolol 100 milliGRAM(s) Oral every 12 hours  furosemide    Tablet 40 milliGRAM(s) Oral every 12 hours    MEDICATIONS  (PRN):  loperamide Solution 2 milliGRAM(s) Oral every 1 hour PRN Diarrhea  HYDROmorphone  Injectable 0.2 milliGRAM(s) IV Push every 4 hours PRN Severe Pain (7 - 10)    Pertinent Labs:  08-21 Na150 mmol/L<H> Glu 195 mg/dL<H> K+ 4.4 mmol/L Cr  1.05 mg/dL BUN 71 mg/dL<H> Phos 2.6 mg/dL Alb 2.7 g/dL<L>       CAPILLARY BLOOD GLUCOSE  203 (21 Aug 2017 06:18)  158 (21 Aug 2017 00:17)  195 (20 Aug 2017 17:59)  181 (20 Aug 2017 12:45)                Skin: +1 generalized edema, intact    Estimated Needs:   [ x] no change since previous assessment-needs continue on dosing weight  [ ] recalculated:       Previous Nutrition Diagnosis:     [ x] Inadequate Protein-Energy Intake [ ]Inadequate Oral Intake [ ] Excessive Energy Intake     [ ] Underweight [ ] Increased Nutrient Needs [ ] Overweight/Obesity     [ ] Altered GI Function [ ] Unintended Weight Loss [ ] Food & Nutrition Related Knowledge Deficit [ ] Malnutrition          Nutrition Diagnosis is [x ] ongoing  [ ] resolved [ ] not applicable -being addressed with tube feeds         New Nutrition Diagnosis: [x ] not applicable    [ ] Inadequate Protein Energy Intake [ ]Inadequate Oral Intake [ ] Excessive Energy Intake     [ ] Underweight [ ] Increased Nutrient Needs [ ] Overweight/Obesity     [ ] Altered GI Function [ ] Unintended Weight Loss [ ] Food & Nutrition Related Knowledge Deficit[ ] Limited Adherence to nutrition related recommendations [ ] Malnutrition  [ ] other: Free text       Related to:      As evidenced by:      Interventions:     Recommend    [ ] Change Diet To:    [ ] Nutrition Supplement    [ ] Nutrition Support    [ ] Other:        Monitoring and Evaluation:     [ ] PO intake [ ] Tolerance to diet prescription [ ] weights [x ] follow up per protocol    [x ] other: monitor for tube feed tolerance Source: Patient [ ]    Family [ ]     other [x ]chart, Nurse    Diet : ENTERAL,NPO/ENTERAL  Pt non-verbal  Diarrhea resolved as per Nurse  Followed by Palliative with eventual home hospice.  s/p IR embolization of hemorrhagic hepatic mass 2/2 fall.      Patient reports [ ] nausea  [ ] vomiting [ ] diarrhea [ ] constipation  [ ]chewing problems [ ] swallowing issues  [ ] other:      PO intake:  < 50% [ ] 50-75% [ ]   % [ ]  other :     Source for PO intake [ ] Patient [ ] family [ ] chart [ ] staff [ ] other     Enteral /Parenteral Nutrition: Vital 1.2 via NGT @ 55ml/hour x 24 hours to provide 1320ml formula, 1584 medardo/day, 99Gm protein/day (meets 21cal/Kg and 1.3Gm protein/Kg based on dosing wt 75.5Kg)    Tolerating without residual as per Nursing.   Followed by Palliative with eventual home hospice.       Current Weight: 163.8pounds/74.3kg  % Weight Change:4% loss since 8/9    Pertinent Medications: MEDICATIONS  (STANDING):  insulin lispro (HumaLOG) corrective regimen sliding scale   SubCutaneous every 6 hours  aspirin  chewable 81 milliGRAM(s) Oral daily  pantoprazole   Suspension 40 milliGRAM(s) Oral daily  potassium chloride   Solution 20 milliEquivalent(s) Oral every 12 hours  spironolactone 50 milliGRAM(s) Oral every 24 hours  enoxaparin Injectable 40 milliGRAM(s) SubCutaneous every 24 hours  psyllium Powder 1 Packet(s) Oral every 8 hours  metoprolol 100 milliGRAM(s) Oral every 12 hours  furosemide    Tablet 40 milliGRAM(s) Oral every 12 hours    MEDICATIONS  (PRN):  loperamide Solution 2 milliGRAM(s) Oral every 1 hour PRN Diarrhea  HYDROmorphone  Injectable 0.2 milliGRAM(s) IV Push every 4 hours PRN Severe Pain (7 - 10)    Pertinent Labs:  08-21 Na150 mmol/L<H> Glu 195 mg/dL<H> K+ 4.4 mmol/L Cr  1.05 mg/dL BUN 71 mg/dL<H> Phos 2.6 mg/dL Alb 2.7 g/dL<L>       CAPILLARY BLOOD GLUCOSE  203 (21 Aug 2017 06:18)  158 (21 Aug 2017 00:17)  195 (20 Aug 2017 17:59)  181 (20 Aug 2017 12:45)                Skin: +1 generalized edema, intact    Estimated Needs:   [ x] no change since previous assessment-needs continue on dosing weight  [ ] recalculated:       Previous Nutrition Diagnosis:     [ x] Inadequate Protein-Energy Intake [ ]Inadequate Oral Intake [ ] Excessive Energy Intake     [ ] Underweight [ ] Increased Nutrient Needs [ ] Overweight/Obesity     [ ] Altered GI Function [ ] Unintended Weight Loss [ ] Food & Nutrition Related Knowledge Deficit [ ] Malnutrition          Nutrition Diagnosis is [x ] ongoing  [ ] resolved [ ] not applicable -being addressed with tube feeds         New Nutrition Diagnosis: [x ] not applicable    [ ] Inadequate Protein Energy Intake [ ]Inadequate Oral Intake [ ] Excessive Energy Intake     [ ] Underweight [ ] Increased Nutrient Needs [ ] Overweight/Obesity     [ ] Altered GI Function [ ] Unintended Weight Loss [ ] Food & Nutrition Related Knowledge Deficit[ ] Limited Adherence to nutrition related recommendations [ ] Malnutrition  [ ] other: Free text       Related to:      As evidenced by:      Interventions:     Recommend    [ ] Change Diet To:    [ ] Nutrition Supplement    [ ] Nutrition Support    [ ] Other:        Monitoring and Evaluation:     [ ] PO intake [ ] Tolerance to diet prescription [ ] weights [x ] follow up per protocol    [x ] other: monitor for tube feed tolerance

## 2017-08-21 NOTE — PROGRESS NOTE ADULT - PROBLEM SELECTOR PLAN 1
Pt with mild labored breathing.   - Will start Dilaudid 0.2 mg Q6 ATC for dyspnea and for neoplasm pain  - Dilaudid 0.2 mg Q2 PRN dyspnea

## 2017-08-21 NOTE — PROGRESS NOTE ADULT - ASSESSMENT
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 Family meeting last week, pt and family agreeable to hospice and comfort care measures.

## 2017-08-21 NOTE — PROGRESS NOTE ADULT - SUBJECTIVE AND OBJECTIVE BOX
INTERVAL HISTORY:    TELEMETRY:  	  MEDICATIONS:  spironolactone 50 milliGRAM(s) Oral every 24 hours  metoprolol 100 milliGRAM(s) Oral every 12 hours  furosemide    Tablet 40 milliGRAM(s) Oral every 12 hours        PHYSICAL EXAM:  T(C): 37.2 (08-21-17 @ 04:19), Max: 37.2 (08-21-17 @ 04:19)  HR: 83 (08-21-17 @ 06:18) (80 - 98)  BP: 144/72 (08-21-17 @ 06:18) (134/78 - 155/77)  RR: 18 (08-21-17 @ 04:19) (17 - 18)  SpO2: 99% (08-21-17 @ 04:19) (96% - 99%)  Wt(kg): --  I&O's Summary    20 Aug 2017 07:01  -  21 Aug 2017 07:00  --------------------------------------------------------  IN: 770 mL / OUT: 0 mL / NET: 770 mL          Appearance: Normal	  HEENT:    PERRL, EOMI	  Lymphatic: No lymphadenopathy  Cardiovascular: Normal S1 S2, No JVD  Respiratory: Lungs clear to auscultation	  Psychiatry: Alert, Mood & affect appropriate  Gastrointestinal:  Soft, Non-tender, + BS	  Skin: No rashes, No cyanosis  Neurologic: Non-focal  Extremities:  No  cyanosis or edema  Vascular: Peripheral pulses palpable  bilaterally      CARDIAC MARKERS:                                  9.9    19.1  )-----------( 290      ( 21 Aug 2017 05:54 )             30.8     08-21    150<H>  |  102  |  71<H>  ----------------------------<  195<H>  4.4   |  36<H>  |  1.05    Ca    8.7      21 Aug 2017 05:54  Phos  2.6     08-21  Mg     1.7     08-21    TPro  6.7  /  Alb  2.7<L>  /  TBili  1.5<H>  /  DBili  x   /  AST  22  /  ALT  16  /  AlkPhos  107  08-21        ASSESSMENT/PLAN: 	  84F w/hx of Afib on Xarelto, MitraClip, sHF, presented to ED w/SOB and abdominal pain after multiple falls. Found to have hemorrhagic hepatic lesion with hemorrhagic shock now  s/p IR embolization of hemorrhagic hepatic lesion (08/08/17)  Patient remains nonverbal (since admission) but follows commands (squeezes fingers, moves legs).   I was consulted for VT with ICD shock. She is off AC given life threatening bleed.     1. VT in the setting of significant hypokalemia s/p ICD shock. No further episodes of sustained VT.   - increased metoprolol dose  - EP follow up appreciated      2. AF - rate control with Metoprolol, 50mg q8. Off AC as life threatening bleed recently   - will consider restarting prior to discharge if deemed safe by surgery    3. sHF- diurese to keep net negative. Agree with Lasix 40mg PO BID for now  - Continue with Aldactone for both sHF and hypokalemia. Monitor ins/outs and kidney function  - continue with Metoprolol, ASA  - Euvolemic    4. HTN - Well controlled on current regimen    5. Neuro - pt nonverbal still. Unclear Etiology. Neuro consult noted, MRI discontinued as ICD not device compatible    6 Pall care follow up noted for inpatient Hospice          Patel Amaya DO [facility]  Cardiovascular Medicine  [phone]

## 2017-08-21 NOTE — PROGRESS NOTE ADULT - PROVIDER SPECIALTY LIST ADULT
Cardiology
Electrophysiology
Intervent Radiology
Neurology
Palliative Care
Palliative Care
SICU
Surgery
Electrophysiology
SICU

## 2017-08-21 NOTE — DISCHARGE NOTE ADULT - CARE PROVIDER_API CALL
Samuel Narayanan (MD; PhD), Surgery  [address] [place] [address]  [place] [address]  Phone: [phone]  Fax: [phone]

## 2017-08-21 NOTE — DISCHARGE NOTE ADULT - PATIENT PORTAL LINK FT
“You can access the Follow[employer] Patient Portal, offered by [facility], by registering with the following website: [email or web address]”

## 2017-08-21 NOTE — DISCHARGE NOTE ADULT - CARE PLAN
Principal Discharge DX:	Neoplasm of liver  Goal:	comfort care  Instructions for follow-up, activity and diet:	comfort care at hospice inn, dilaudid 0.2mg IV every 6 hours around the clock and dilaudid 0.2 IV every 2 hours as needed for difficulty breathing/pain

## 2017-08-21 NOTE — DISCHARGE NOTE ADULT - MEDICATION SUMMARY - MEDICATIONS TO TAKE
I will START or STAY ON the medications listed below when I get home from the hospital:    HYDROmorphone  -- 0.2 milligram(s) intravenous every 6 hours  -- Indication: For Pain    HYDROmorphone  -- 0.2 milligram(s) intravenous every 2 hours, As Needed for dyspnea   -- Indication: For Pain    Lasix  -- 40 milligram(s) intravenous 2 times a day  -- Indication: For Decrease fluid     Protonix IV 40 mg intravenous injection  --  intravenous once a day  -- Indication: For Reflux

## 2017-08-21 NOTE — DISCHARGE NOTE ADULT - MEDICATION SUMMARY - MEDICATIONS TO CHANGE
I will SWITCH the dose or number of times a day I take the medications listed below when I get home from the hospital:    furosemide 20 mg oral tablet  -- 1 tab(s) by mouth once a day MDD:1

## 2017-08-21 NOTE — PROCEDURE NOTE - NSINTMANUFACTURE_CARD_ALL_CORE
[facility]

## 2017-08-21 NOTE — PROCEDURE NOTE - ADDITIONAL PROCEDURE DETAILS
Indication for  Interrogation: Parameters adjusted  1. Defibrillator  therapy turned off as requested by surgery team, pt for transfer to Hospice facility today.
1) Indication for interrogation: NSVT on Tele  2) Measured data WNL, NL ICD function, Pt is not PM dependent  3) Stored data revealed no events for review.  4) Changes made: none  5) Above findings d/w SICU team.       Debra Allen, ANP  [id number]
1) Indication for interrogation: s/p ICD shock today  2) Measured data WNL, NL ICD function, Pt is not PM dependent  3) Stored data revealed one high rate episode from today (8/14/17) at 11:03am c/w monomorphic VTach at rate 224 which treated and terminated with ATP x 1 and ICD shock x 1 at 41J.  4) Changes made: none  5) This device is NOT MRI compatible.  6) EP consult note to follow.  7) Discussed with SICU team.     Debra Allen, ANP  [id number]

## 2017-09-08 ENCOUNTER — APPOINTMENT (OUTPATIENT)
Dept: ELECTROPHYSIOLOGY | Facility: CLINIC | Age: 82
End: 2017-09-08

## 2018-04-06 NOTE — GOALS OF CARE CONVERSATION - PERSONAL ADVANCE DIRECTIVE - TREATMENT GUIDELINES
antibiotics to be decided at the time of event./Antibiotic trial/Comfort measures only/No artificial nutrition/DNR Order/IV fluid trial
wine

## 2018-04-19 NOTE — PHYSICAL THERAPY INITIAL EVALUATION ADULT - PHYSICAL ASSIST/NONPHYSICAL ASSIST: SIT/SUPINE, REHAB EVAL
Pt rcvd to room 28, sent in by vascular sx for eval to R foot for gangrene. Pt has 5 toes intact, some gangrene observed. Decreased sensation of toes, + sensation to R foot. Denies pain. Currently c/o numbness. Aox3, ambulatory with cane at baseline. No ulcers observed to R foot. + pedal pulse. Awaiting MD martell. VSS. Will continue to monitor. 1 person assist

## 2018-06-27 NOTE — PHYSICAL THERAPY INITIAL EVALUATION ADULT - PHYSICAL ASSIST/NONPHYSICAL ASSIST: GAIT, REHAB EVAL
verbal cues/supervision
Plan: Discussed accutane as best course of action. Pt declines at this time
Detail Level: Zone
Initiate Treatment: Tretinoin 0.05 qhs, aczone 7.5% qam

## 2018-07-16 PROBLEM — R06.00 DYSPNEA, UNSPECIFIED: Chronic | Status: ACTIVE | Noted: 2017-05-10

## 2018-07-30 PROBLEM — I34.0 SEVERE MITRAL REGURGITATION: Status: ACTIVE | Noted: 2017-03-15

## 2019-01-15 NOTE — PATIENT PROFILE ADULT. - CAREGIVER
Hospital follow-up PCP transitional care appointment has been scheduled with Dr. Marika Myrick for Tuesday, 1/22/19 at 11:30 a.m. Pending patient discharge.   Henrry Hernandez, Care Management Specialist. 
 
Received request from Esme CULLEN to cancel PCP follow up appointment for Tuesday, 1/22/19 at 11:30 a.m.  Henrry Hernandez, Care Management Specialist. 
 Information could not be obtained

## 2020-05-11 NOTE — ED ADULT NURSE NOTE - NS ED NURSE RECORD ANOTHER HT AND WT
----- Message from Ana Vasquez sent at 5/11/2020 10:32 AM CDT -----  Contact: [phone]/self  Alia/jenny is reporting she needs to confirm if it is ok to take fluconazole (DIFLUCAN) 200 MG Tab. She is worried about having a reaction to it. Please call patient to clarify. She will be discharged today from Home Health. You can also call Alia to discuss at [phone] Thanks    No

## 2021-04-12 NOTE — H&P ADULT - NSHPPOASURGSITEINCISION_GEN_ALL_CORE
Patient was seen for a regulatory long-term care visit    Case reviewed with nurse practitioner.    Patient was recently seen by neurology to follow-up hospitalization in August 2020 for acute encephalopathy with EEG findings consistent with an active epileptiform focus.  Patient was started on Keppra however Keppra has been reduced to 125 mg twice daily in view of side effects of lethargy.  Neurology has recommended continuing this low-dose because of the past EEG findings.    Patient's level of alertness overall has improved with the reduction in Keppra.  There is been no clinically evident seizure activity.    Her overall mental function status have been stable.    Joelle states she feels fine, denies any specific physical concerns.  History is limited secondary to hard of hearing.    Vital signs have been stable  Lisinopril recently decreased secondary to relatively low blood pressures  Patient is alert, appears oriented to self and surroundings  Lungs clear  CV regular rhythm  Abdomen soft  No edema    Assessment    Dementia with stable mental functional status, overall improved level alertness with reduction in Keppra to 125 mg twice daily    Recent hospitalization for encephalopathy last year felt secondary to underlying seizure disorder, clinically stable on reduced dose of Keppra    Hypertension, stable on lisinopril and metoprolol    History of pulmonary embolism, history heterozygous factor V Leiden, on chronic Eliquis    Hypothyroidism, on levothyroxine.  TSH normal on 12/21/2020    Plan  Continue current cares including low-dose Keppra  Monitor BP on current regimen, avoid hypotension  Routine lab monitoring                 no

## 2021-08-02 NOTE — PHYSICAL THERAPY INITIAL EVALUATION ADULT - PHYSICAL ASSIST/NONPHYSICAL ASSIST: STAND/SIT, REHAB EVAL
Kary called with concerns over patients wrap dressings. Patient and daughter were not sure what to do with the wraps. Upon looking when the patients next dressing change appointment is it was noted that one was not scheduled. Educated Kary that patient would need to be seen weekly for rn dressing changes until next appointment. Offered to Kary to have Melanie schedule an appointment for this Tuesday. Kary and patient agreeable. Melanie to schedule appointments.   
1 person assist

## 2021-11-21 NOTE — CONSULT NOTE ADULT - CONSULT REQUESTED DATE/TIME
Subjective     Harmony Monroe is a 61 y.o. female who presents with Nausea (chills, sweats, started last night )            Nausea  This is a new problem. Episode onset: pt reports new onset of nausea, chills and sweats that started last night. No fever, vomiting or diarrhea. She denies any sick contacts. She is fully vaccinated for COVID. The problem has been unchanged. Associated symptoms include chills and nausea. Associated symptoms comments: Reports her stomach feels upset and gurgling, but no pain or bloating. Treatments tried: she has taken zofran that she has at home. also eating bland diet and drinking fluids. The treatment provided mild relief.       Review of Systems   Constitutional: Positive for chills.   Gastrointestinal: Positive for nausea.   All other systems reviewed and are negative.         Past Medical History:   Diagnosis Date   • Allergy    • Arthritis     osteoarthritis; right knee   • ASTHMA     1/29/2018 not an issue, currently no medications needed   • Basal cell carcinoma of left medial cheek 11/30/2015   • Basal cell carcinoma of left medial cheek 11/30/2015   • Depression 1/7/2010   • High cholesterol    • History of tobacco use disorder 1/7/2010   • Hypertension    • Pneumonia 1990,2001   • Post-menopausal 8/26/2014      Past Surgical History:   Procedure Laterality Date   • KNEE ARTHROSCOPY Left 2/5/2018    Procedure: KNEE ARTHROSCOPY;  Surgeon: Andrews Castro M.D.;  Location: [facility];  Service: Orthopedics   • MEDIAL MENISCECTOMY Left 2/5/2018    Procedure: MEDIAL MENISCECTOMY - PARTIAL;  Surgeon: Andrews Castro M.D.;  Location: [facility];  Service: Orthopedics   • KNEE ARTHROSCOPY Right 3/23/2017    Procedure: KNEE ARTHROSCOPY;  Surgeon: Andrews Castro M.D.;  Location: [facility];  Service:    • MEDIAL MENISCECTOMY  3/23/2017    Procedure: MEDIAL and lateral  MENISCECTOMY - PARTIAL;  Surgeon: Andrews Castro M.D.;  Location:  SUR[facility]ERY [facility];  Service:    • CHONDROPLASTY  3/23/2017    Procedure: CHONDROPLASTY -  PATELLAR;  Surgeon: Andrews Castro M.D.;  Location: SURGERY [facility];  Service:    • ABDOMINAL HYSTERECTOMY TOTAL  2005   • TONSILLECTOMY AND ADENOIDECTOMY     • NASAL SEPTAL RECONSTRUCTION     • CYST EXCISION      Anterior Left Foot      Social History     Socioeconomic History   • Marital status: Single     Spouse name: Not on file   • Number of children: Not on file   • Years of education: Not on file   • Highest education level: Not on file   Occupational History   • Not on file   Tobacco Use   • Smoking status: Current Every Day Smoker     Packs/day: 0.50     Years: 19.00     Pack years: 9.50     Types: Cigarettes     Start date: 1997     Last attempt to quit: 2/15/2020     Years since quittin.7   • Smokeless tobacco: Never Used   • Tobacco comment: 1 pack / week   Vaping Use   • Vaping Use: Never used   Substance and Sexual Activity   • Alcohol use: No     Alcohol/week: 0.0 oz   • Drug use: No   • Sexual activity: Never   Other Topics Concern   • Not on file   Social History Narrative   • Not on file     Social Determinants of Health     Financial Resource Strain:    • Difficulty of Paying Living Expenses: Not on file   Food Insecurity:    • Worried About Running Out of Food in the Last Year: Not on file   • Ran Out of Food in the Last Year: Not on file   Transportation Needs:    • Lack of Transportation (Medical): Not on file   • Lack of Transportation (Non-Medical): Not on file   Physical Activity:    • Days of Exercise per Week: Not on file   • Minutes of Exercise per Session: Not on file   Stress:    • Feeling of Stress : Not on file   Social Connections:    • Frequency of Communication with Friends and Family: Not on file   • Frequency of Social Gatherings with Friends and Family: Not on file   • Attends [employer] Services: Not on file   • Active Member of Clubs or  "Organizations: Not on file   • Attends Club or Organization Meetings: Not on file   • Marital Status: Not on file   Intimate Partner Violence:    • Fear of Current or Ex-Partner: Not on file   • Emotionally Abused: Not on file   • Physically Abused: Not on file   • Sexually Abused: Not on file   Housing Stability:    • Unable to Pay for Housing in the Last Year: Not on file   • Number of Places Lived in the Last Year: Not on file   • Unstable Housing in the Last Year: Not on file         Objective     /68 (BP Location: Right arm, Patient Position: Sitting, BP Cuff Size: Large adult)   Pulse 89   Temp 36.3 °C (97.4 °F) (Temporal)   Resp 16   Ht 1.626 m (5' 4\")   Wt 83.5 kg (184 lb)   SpO2 98%   BMI 31.58 kg/m²      Physical Exam  Vitals and nursing note reviewed.   Constitutional:       Appearance: Normal appearance. She is normal weight. She is not ill-appearing.   HENT:      Head: Normocephalic and atraumatic.      Right Ear: External ear normal.      Left Ear: External ear normal.      Nose: Nose normal.      Mouth/Throat:      Mouth: Mucous membranes are moist.      Pharynx: Oropharynx is clear.   Eyes:      Extraocular Movements: Extraocular movements intact.      Pupils: Pupils are equal, round, and reactive to light.   Cardiovascular:      Rate and Rhythm: Normal rate and regular rhythm.   Pulmonary:      Effort: Pulmonary effort is normal.   Musculoskeletal:         General: Normal range of motion.      Cervical back: Normal range of motion.   Skin:     General: Skin is warm and dry.      Capillary Refill: Capillary refill takes less than 2 seconds.   Neurological:      General: No focal deficit present.      Mental Status: She is alert and oriented to person, place, and time. Mental status is at baseline.   Psychiatric:         Mood and Affect: Mood normal.         Speech: Speech normal.         Thought Content: Thought content normal.         Judgment: Judgment normal.                       " 14-Aug-2017 06:00       Assessment & Plan        1. Chills  - COVID/SARS CoV-2 PCR; Future    2. Nausea  - COVID/SARS CoV-2 PCR; Future    3. Upset stomach  - COVID/SARS CoV-2 PCR; Future            Discussed with patient since her symptoms have only been present for 12 hours, she is too early to be swabbed for COVID. I will order the test, but I would like her to return tomorrow morning to have the swab collected. She understands and agrees  Encouraged bland diet and fluids  • Patient's vital signs are reassuring and they appear hemodynamically stable and do not require higher level care at this time  • I discussed self isolation and provided printed instructions (if applicable)  • I discussed ER precautions and provided printed instructions (if applicable)  • I educated the patient on possibility of a false-negative test  • I instructed the patient to try to follow up with their PCP (if applicable) for follow up care  • I provided the patient the printed AVS which contains information about signing up for MyChart   • I will contact the patient via source[employer] with Covid results.  • If requested, I provided the patient with a work note to provide to their employer or school regarding returning to work and discontinuation of self isolation.  • All questions were answered and patient demonstrated verbal understanding of above.  • I followed all reasonable PPE precautions during this encounter including but not limited to use of an N95 mask, gloves, and gown if indicated.    Supportive care, differential diagnoses, and indications for immediate follow-up discussed with patient.    Pathogenesis of diagnosis discussed including typical length and natural progression.      Instructed to return to  or nearest emergency department if symptoms fail to improve, for any change in condition, further concerns, or new concerning symptoms.  Patient states understanding of the plan of care and discharge instructions.

## 2022-04-05 NOTE — BRIEF OPERATIVE NOTE - ASSISTANT(S)
BRIONNA Rey Topical Sulfur Applications Counseling: Topical Sulfur Counseling: Patient counseled that this medication may cause skin irritation or allergic reactions.  In the event of skin irritation, the patient was advised to reduce the amount of the drug applied or use it less frequently.   The patient verbalized understanding of the proper use and possible adverse effects of topical sulfur application.  All of the patient's questions and concerns were addressed.

## 2022-09-09 NOTE — PHYSICAL THERAPY INITIAL EVALUATION ADULT - ASR WT BEARING STATUS EVAL
Goals:  Do not skip any meals! Food log (ie ) www myfitnesspal com,sparkpeople  com,loseit com,calorieking  com,etc  baritastic (use skinnytaste  com, dietdoctor  com or smartphone donaldo schoox for recipes)  No sugary beverages  At least 64oz of water daily  Increase physical activity by 10 minutes daily  Gradually increase physical activity to a goal of 5 days per week for 30 minutes of MODERATE intensity PLUS 2 days per week of FULL BODY resistance training (use smartphone apps [employer], Home Workout, etc )  Start Wellbutrin 150mg by mouth daily  Monitor Blood Pressure  If top number is 140 or higher or bottom number is 90 or higher call the office  If depression or anxiety worsen or suicidal thoughts develop please go to the ER right away    Send a [employer] message when you see the Endocrinologist 
no weight-bearing restrictions

## 2022-11-07 NOTE — PROCEDURE NOTE - NSTIMEOUT_GEN_A_CORE
Patient's first and last name, , procedure, and correct site confirmed prior to the start of procedure. done

## 2024-06-07 NOTE — PROGRESS NOTE ADULT - ASSESSMENT
Patient safe to be discharged home per provider. Discharge education given via printed AVS. Reviewed: follow up care, medications and when to seek emergency care. Return to work letter given. Patient verbalizes understanding and is able to teach back. Patient discharged vial wheelchair.      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  -Leukocytosis worsening, WBC 30.6 today (08/10/17) up from yesterday, 23.5.  Patient is afebrile, continue to trend WBCs per SICU recommendations  -Continue pain control (fentanyl), continue sedation (precedex)  -Chest Xray and ABG normal, attempt SBT again today for potential extubation.

## 2024-08-19 NOTE — DISCHARGE NOTE ADULT - PATIENT PORTAL LINK FT
[FreeTextEntry1] : CHENG ROBERTO is here for follow up with her . Since last visit she is still reporting a lot of RIGHT shoulder pain. It is 8/10 on NRS and limits her ability to move the shoulder. Pain goes into the triceps and biceps.   Denies any new pain, numbness or weakness, bowel/bladder dysfunction, saddle anesthesia, fevers, chills, weight loss, night pain, or night sweats at this time. “You can access the [facility] Patient Portal, offered by [facility], by registering with the following website: [email or web address]”

## 2024-10-14 NOTE — ED CLERICAL - DIVISION
[facility]... What Type Of Note Output Would You Prefer (Optional)?: Bullet Format Hpi Title: Evaluation of Skin Lesions